# Patient Record
Sex: FEMALE | Race: WHITE | Employment: OTHER | URBAN - METROPOLITAN AREA
[De-identification: names, ages, dates, MRNs, and addresses within clinical notes are randomized per-mention and may not be internally consistent; named-entity substitution may affect disease eponyms.]

---

## 2017-01-10 ENCOUNTER — TELEPHONE (OUTPATIENT)
Dept: CARDIOLOGY CLINIC | Age: 72
End: 2017-01-10
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
## 2017-02-12 PROBLEM — R07.9 CHEST PAIN: Status: ACTIVE | Noted: 2017-02-12

## 2017-02-12 PROBLEM — N39.0 UTI (URINARY TRACT INFECTION): Status: ACTIVE | Noted: 2017-02-12

## 2017-02-14 ENCOUNTER — PROCEDURE VISIT (OUTPATIENT)
Dept: CARDIOLOGY CLINIC | Age: 72
End: 2017-02-14

## 2017-02-14 DIAGNOSIS — I44.2 COMPLETE HEART BLOCK (HCC): ICD-10-CM

## 2017-02-14 DIAGNOSIS — Z95.0 CARDIAC PACEMAKER IN SITU: Primary | ICD-10-CM

## 2017-02-14 DIAGNOSIS — I50.22 CHRONIC SYSTOLIC HEART FAILURE (HCC): ICD-10-CM

## 2017-02-14 PROCEDURE — 93290 INTERROG DEV EVAL ICPMS IP: CPT | Performed by: INTERNAL MEDICINE

## 2017-02-14 PROCEDURE — 93280 PM DEVICE PROGR EVAL DUAL: CPT | Performed by: INTERNAL MEDICINE

## 2017-03-01 DIAGNOSIS — I48.0 PAROXYSMAL ATRIAL FIBRILLATION (HCC): Primary | ICD-10-CM

## 2017-03-27 ENCOUNTER — PROCEDURE VISIT (OUTPATIENT)
Dept: CARDIOLOGY CLINIC | Age: 72
End: 2017-03-27

## 2017-03-27 ENCOUNTER — OFFICE VISIT (OUTPATIENT)
Dept: CARDIOLOGY CLINIC | Age: 72
End: 2017-03-27

## 2017-03-27 VITALS
HEIGHT: 67 IN | SYSTOLIC BLOOD PRESSURE: 122 MMHG | HEART RATE: 60 BPM | BODY MASS INDEX: 26.37 KG/M2 | OXYGEN SATURATION: 95 % | WEIGHT: 168 LBS | DIASTOLIC BLOOD PRESSURE: 62 MMHG

## 2017-03-27 DIAGNOSIS — I50.22 CHRONIC SYSTOLIC HEART FAILURE (HCC): ICD-10-CM

## 2017-03-27 DIAGNOSIS — I44.2 COMPLETE HEART BLOCK (HCC): ICD-10-CM

## 2017-03-27 DIAGNOSIS — I48.0 PAROXYSMAL ATRIAL FIBRILLATION (HCC): ICD-10-CM

## 2017-03-27 DIAGNOSIS — I10 ESSENTIAL HYPERTENSION, BENIGN: ICD-10-CM

## 2017-03-27 DIAGNOSIS — Z95.0 CARDIAC PACEMAKER IN SITU: Primary | ICD-10-CM

## 2017-03-27 DIAGNOSIS — I50.32 DIASTOLIC CHF, CHRONIC (HCC): Primary | ICD-10-CM

## 2017-03-27 PROCEDURE — 4040F PNEUMOC VAC/ADMIN/RCVD: CPT | Performed by: NURSE PRACTITIONER

## 2017-03-27 PROCEDURE — G8484 FLU IMMUNIZE NO ADMIN: HCPCS | Performed by: NURSE PRACTITIONER

## 2017-03-27 PROCEDURE — 93280 PM DEVICE PROGR EVAL DUAL: CPT | Performed by: INTERNAL MEDICINE

## 2017-03-27 PROCEDURE — G8427 DOCREV CUR MEDS BY ELIG CLIN: HCPCS | Performed by: NURSE PRACTITIONER

## 2017-03-27 PROCEDURE — 1036F TOBACCO NON-USER: CPT | Performed by: NURSE PRACTITIONER

## 2017-03-27 PROCEDURE — 93290 INTERROG DEV EVAL ICPMS IP: CPT | Performed by: INTERNAL MEDICINE

## 2017-03-27 PROCEDURE — G8400 PT W/DXA NO RESULTS DOC: HCPCS | Performed by: NURSE PRACTITIONER

## 2017-03-27 PROCEDURE — 3014F SCREEN MAMMO DOC REV: CPT | Performed by: NURSE PRACTITIONER

## 2017-03-27 PROCEDURE — 1123F ACP DISCUSS/DSCN MKR DOCD: CPT | Performed by: NURSE PRACTITIONER

## 2017-03-27 PROCEDURE — G8420 CALC BMI NORM PARAMETERS: HCPCS | Performed by: NURSE PRACTITIONER

## 2017-03-27 PROCEDURE — 1090F PRES/ABSN URINE INCON ASSESS: CPT | Performed by: NURSE PRACTITIONER

## 2017-03-27 PROCEDURE — 3017F COLORECTAL CA SCREEN DOC REV: CPT | Performed by: NURSE PRACTITIONER

## 2017-03-27 PROCEDURE — 99214 OFFICE O/P EST MOD 30 MIN: CPT | Performed by: NURSE PRACTITIONER

## 2017-03-27 RX ORDER — DILTIAZEM HYDROCHLORIDE 120 MG/1
120 TABLET, FILM COATED ORAL DAILY
COMMUNITY
End: 2017-03-30 | Stop reason: SDUPTHER

## 2017-03-27 RX ORDER — OMEPRAZOLE 20 MG/1
20 CAPSULE, DELAYED RELEASE ORAL 2 TIMES DAILY
COMMUNITY
End: 2017-03-30 | Stop reason: ALTCHOICE

## 2017-03-28 ENCOUNTER — TELEPHONE (OUTPATIENT)
Dept: CARDIOLOGY CLINIC | Age: 72
End: 2017-03-28

## 2017-03-30 RX ORDER — ONDANSETRON 4 MG/1
4 TABLET, ORALLY DISINTEGRATING ORAL EVERY 8 HOURS PRN
COMMUNITY
End: 2017-07-24 | Stop reason: ALTCHOICE

## 2017-03-30 RX ORDER — ESOMEPRAZOLE MAGNESIUM 40 MG/1
CAPSULE, DELAYED RELEASE ORAL DAILY
COMMUNITY
End: 2018-10-15 | Stop reason: ALTCHOICE

## 2017-03-30 RX ORDER — HYDROCHLOROTHIAZIDE 25 MG/1
25 TABLET ORAL DAILY
COMMUNITY
End: 2017-04-24 | Stop reason: SDUPTHER

## 2017-04-24 ENCOUNTER — OFFICE VISIT (OUTPATIENT)
Dept: CARDIOLOGY CLINIC | Age: 72
End: 2017-04-24

## 2017-04-24 VITALS
DIASTOLIC BLOOD PRESSURE: 62 MMHG | SYSTOLIC BLOOD PRESSURE: 138 MMHG | WEIGHT: 172 LBS | OXYGEN SATURATION: 96 % | HEIGHT: 67 IN | BODY MASS INDEX: 27 KG/M2 | HEART RATE: 80 BPM

## 2017-04-24 DIAGNOSIS — I50.32 DIASTOLIC CHF, CHRONIC (HCC): ICD-10-CM

## 2017-04-24 DIAGNOSIS — I10 ESSENTIAL HYPERTENSION, BENIGN: ICD-10-CM

## 2017-04-24 DIAGNOSIS — I48.0 PAROXYSMAL ATRIAL FIBRILLATION (HCC): ICD-10-CM

## 2017-04-24 DIAGNOSIS — I50.32 DIASTOLIC CHF, CHRONIC (HCC): Primary | ICD-10-CM

## 2017-04-24 DIAGNOSIS — Z79.899 LONG-TERM USE OF HIGH-RISK MEDICATION: ICD-10-CM

## 2017-04-24 PROCEDURE — 99214 OFFICE O/P EST MOD 30 MIN: CPT | Performed by: NURSE PRACTITIONER

## 2017-04-25 ENCOUNTER — HOSPITAL ENCOUNTER (OUTPATIENT)
Dept: OTHER | Age: 72
Discharge: HOME | End: 2017-04-30
Attending: INTERNAL MEDICINE | Admitting: INTERNAL MEDICINE

## 2017-04-25 LAB
ANION GAP SERPL CALCULATED.3IONS-SCNC: 15 MMOL/L (ref 3–16)
BUN BLDV-MCNC: 17 MG/DL (ref 7–20)
CALCIUM SERPL-MCNC: 9.8 MG/DL (ref 8.3–10.6)
CHLORIDE BLD-SCNC: 97 MMOL/L (ref 99–110)
CO2: 25 MMOL/L (ref 21–32)
CREAT SERPL-MCNC: 0.8 MG/DL (ref 0.6–1.2)
GFR AFRICAN AMERICAN: >60
GFR NON-AFRICAN AMERICAN: >60
GLUCOSE BLD-MCNC: 93 MG/DL (ref 70–99)
POTASSIUM SERPL-SCNC: 3.4 MMOL/L (ref 3.5–5.1)
SODIUM BLD-SCNC: 137 MMOL/L (ref 136–145)

## 2017-04-25 RX ORDER — POTASSIUM CHLORIDE 20 MEQ/1
TABLET, EXTENDED RELEASE ORAL
Qty: 30 TABLET | Refills: 3 | OUTPATIENT
Start: 2017-04-25 | End: 2017-04-25

## 2017-04-25 RX ORDER — DILTIAZEM HYDROCHLORIDE 120 MG/1
CAPSULE, COATED, EXTENDED RELEASE ORAL
Qty: 30 CAPSULE | Refills: 3 | OUTPATIENT
Start: 2017-04-25 | End: 2017-06-22 | Stop reason: SDUPTHER

## 2017-04-25 RX ORDER — HYDROCHLOROTHIAZIDE 25 MG/1
TABLET ORAL
Qty: 90 TABLET | Refills: 3 | Status: SHIPPED | OUTPATIENT
Start: 2017-04-25 | End: 2017-06-02

## 2017-04-25 RX ORDER — POTASSIUM CHLORIDE 20 MEQ/1
TABLET, EXTENDED RELEASE ORAL
Qty: 90 TABLET | Refills: 3 | Status: SHIPPED | OUTPATIENT
Start: 2017-04-25 | End: 2017-08-24 | Stop reason: SDUPTHER

## 2017-04-25 RX ORDER — HYDROCHLOROTHIAZIDE 25 MG/1
25 TABLET ORAL DAILY
Qty: 30 TABLET | Refills: 3 | OUTPATIENT
Start: 2017-04-25 | End: 2017-04-25

## 2017-05-02 ENCOUNTER — TELEPHONE (OUTPATIENT)
Dept: CARDIOLOGY CLINIC | Age: 72
End: 2017-05-02

## 2017-05-05 ENCOUNTER — TELEPHONE (OUTPATIENT)
Dept: CARDIOLOGY CLINIC | Age: 72
End: 2017-05-05

## 2017-05-12 ENCOUNTER — TELEPHONE (OUTPATIENT)
Dept: CARDIOLOGY | Age: 72
End: 2017-05-12

## 2017-05-12 DIAGNOSIS — E87.6 HYPOKALEMIA: Primary | ICD-10-CM

## 2017-05-30 ENCOUNTER — HOSPITAL ENCOUNTER (OUTPATIENT)
Dept: OTHER | Age: 72
Discharge: HOME | End: 2017-05-30
Attending: NURSE PRACTITIONER | Admitting: NURSE PRACTITIONER

## 2017-05-30 DIAGNOSIS — M25.431 SWELLING OF JOINT OF RIGHT WRIST: ICD-10-CM

## 2017-05-30 DIAGNOSIS — M79.89 SWELLING OF RIGHT HAND: ICD-10-CM

## 2017-06-02 ENCOUNTER — TELEPHONE (OUTPATIENT)
Dept: CARDIOLOGY CLINIC | Age: 72
End: 2017-06-02

## 2017-06-02 RX ORDER — TORSEMIDE 20 MG/1
20 TABLET ORAL DAILY
Qty: 30 TABLET | Refills: 5 | Status: SHIPPED | OUTPATIENT
Start: 2017-06-02 | End: 2017-06-02 | Stop reason: SDUPTHER

## 2017-06-04 RX ORDER — TORSEMIDE 20 MG/1
TABLET ORAL
Qty: 90 TABLET | Refills: 0 | Status: SHIPPED | OUTPATIENT
Start: 2017-06-04 | End: 2017-09-05

## 2017-06-05 ENCOUNTER — TELEPHONE (OUTPATIENT)
Dept: CARDIOLOGY CLINIC | Age: 72
End: 2017-06-05

## 2017-06-26 RX ORDER — DILTIAZEM HYDROCHLORIDE 120 MG/1
CAPSULE, EXTENDED RELEASE ORAL
Qty: 30 CAPSULE | Refills: 5 | Status: SHIPPED | OUTPATIENT
Start: 2017-06-26 | End: 2017-11-02 | Stop reason: SDUPTHER

## 2017-07-24 ENCOUNTER — OFFICE VISIT (OUTPATIENT)
Dept: CARDIOLOGY CLINIC | Age: 72
End: 2017-07-24

## 2017-07-24 VITALS
BODY MASS INDEX: 29.53 KG/M2 | HEIGHT: 64 IN | HEART RATE: 61 BPM | SYSTOLIC BLOOD PRESSURE: 112 MMHG | OXYGEN SATURATION: 97 % | WEIGHT: 173 LBS | DIASTOLIC BLOOD PRESSURE: 50 MMHG

## 2017-07-24 DIAGNOSIS — I10 ESSENTIAL HYPERTENSION, BENIGN: ICD-10-CM

## 2017-07-24 DIAGNOSIS — I48.0 PAROXYSMAL ATRIAL FIBRILLATION (HCC): ICD-10-CM

## 2017-07-24 DIAGNOSIS — I50.32 DIASTOLIC CHF, CHRONIC (HCC): Primary | ICD-10-CM

## 2017-07-24 PROBLEM — N39.0 UTI (URINARY TRACT INFECTION): Status: RESOLVED | Noted: 2017-02-12 | Resolved: 2017-07-24

## 2017-07-24 PROCEDURE — 99214 OFFICE O/P EST MOD 30 MIN: CPT | Performed by: NURSE PRACTITIONER

## 2017-07-24 RX ORDER — LISINOPRIL 10 MG/1
10 TABLET ORAL DAILY
COMMUNITY
End: 2018-10-15 | Stop reason: SDUPTHER

## 2017-08-25 RX ORDER — POTASSIUM CHLORIDE 20 MEQ/1
TABLET, EXTENDED RELEASE ORAL
Qty: 30 TABLET | Refills: 5 | Status: SHIPPED | OUTPATIENT
Start: 2017-08-25 | End: 2018-02-06 | Stop reason: SDUPTHER

## 2017-09-05 RX ORDER — TORSEMIDE 20 MG/1
TABLET ORAL
Qty: 90 TABLET | Refills: 0 | Status: SHIPPED | OUTPATIENT
Start: 2017-09-05 | End: 2017-12-11

## 2017-11-02 RX ORDER — DILTIAZEM HYDROCHLORIDE 120 MG/1
CAPSULE, EXTENDED RELEASE ORAL
Qty: 30 CAPSULE | Refills: 1 | Status: SHIPPED | OUTPATIENT
Start: 2017-11-02 | End: 2018-05-01

## 2017-12-11 RX ORDER — TORSEMIDE 20 MG/1
TABLET ORAL
Qty: 90 TABLET | Refills: 0 | Status: SHIPPED | OUTPATIENT
Start: 2017-12-11 | End: 2018-03-15 | Stop reason: SDUPTHER

## 2018-03-02 ENCOUNTER — OFFICE VISIT (OUTPATIENT)
Dept: CARDIOLOGY CLINIC | Age: 73
End: 2018-03-02

## 2018-03-02 ENCOUNTER — PROCEDURE VISIT (OUTPATIENT)
Dept: CARDIOLOGY CLINIC | Age: 73
End: 2018-03-02

## 2018-03-02 ENCOUNTER — HOSPITAL ENCOUNTER (OUTPATIENT)
Dept: GENERAL RADIOLOGY | Age: 73
Discharge: HOME | End: 2018-03-02
Attending: NURSE PRACTITIONER | Admitting: NURSE PRACTITIONER

## 2018-03-02 ENCOUNTER — TELEPHONE (OUTPATIENT)
Dept: CARDIOLOGY CLINIC | Age: 73
End: 2018-03-02

## 2018-03-02 VITALS
HEIGHT: 64 IN | WEIGHT: 187 LBS | BODY MASS INDEX: 31.92 KG/M2 | DIASTOLIC BLOOD PRESSURE: 60 MMHG | OXYGEN SATURATION: 97 % | HEART RATE: 61 BPM | SYSTOLIC BLOOD PRESSURE: 142 MMHG

## 2018-03-02 DIAGNOSIS — R60.0 LOCALIZED EDEMA: ICD-10-CM

## 2018-03-02 DIAGNOSIS — I44.2 COMPLETE HEART BLOCK (HCC): ICD-10-CM

## 2018-03-02 DIAGNOSIS — I10 ESSENTIAL HYPERTENSION: ICD-10-CM

## 2018-03-02 DIAGNOSIS — I50.33 ACUTE ON CHRONIC DIASTOLIC CONGESTIVE HEART FAILURE (HCC): Primary | ICD-10-CM

## 2018-03-02 DIAGNOSIS — Z95.0 PACEMAKER: Primary | ICD-10-CM

## 2018-03-02 DIAGNOSIS — I48.20 CHRONIC ATRIAL FIBRILLATION (HCC): ICD-10-CM

## 2018-03-02 LAB
A/G RATIO: 1.3 (ref 1.1–2.2)
ALBUMIN SERPL-MCNC: 4.3 G/DL (ref 3.4–5)
ALP BLD-CCNC: 94 U/L (ref 40–129)
ALT SERPL-CCNC: 12 U/L (ref 10–40)
ANION GAP SERPL CALCULATED.3IONS-SCNC: 12 MMOL/L (ref 3–16)
AST SERPL-CCNC: 26 U/L (ref 15–37)
BILIRUB SERPL-MCNC: 0.7 MG/DL (ref 0–1)
BUN BLDV-MCNC: 16 MG/DL (ref 7–20)
CALCIUM SERPL-MCNC: 9.6 MG/DL (ref 8.3–10.6)
CHLORIDE BLD-SCNC: 102 MMOL/L (ref 99–110)
CO2: 28 MMOL/L (ref 21–32)
CREAT SERPL-MCNC: 0.8 MG/DL (ref 0.6–1.2)
GFR AFRICAN AMERICAN: >60
GFR NON-AFRICAN AMERICAN: >60
GLOBULIN: 3.4 G/DL
GLUCOSE BLD-MCNC: 106 MG/DL (ref 70–99)
POTASSIUM SERPL-SCNC: 3.8 MMOL/L (ref 3.5–5.1)
PRO-BNP: 1134 PG/ML (ref 0–449)
SODIUM BLD-SCNC: 142 MMOL/L (ref 136–145)
TOTAL PROTEIN: 7.7 G/DL (ref 6.4–8.2)

## 2018-03-02 PROCEDURE — 99214 OFFICE O/P EST MOD 30 MIN: CPT | Performed by: NURSE PRACTITIONER

## 2018-03-02 PROCEDURE — 93280 PM DEVICE PROGR EVAL DUAL: CPT | Performed by: INTERNAL MEDICINE

## 2018-03-02 RX ORDER — ONDANSETRON 4 MG/1
4 TABLET, FILM COATED ORAL EVERY 6 HOURS PRN
COMMUNITY

## 2018-03-02 NOTE — LETTER
nourished / developed  NEUROLOGIC:  Awake and orientated to person, place and time. PSYCH: Calm affect; very pleasant lady. SKIN: Warm and dry. HEENT: Sclera non-icteric, normocephalic, neck supple, no elevation of JVP, normal carotid pulses with no bruits and thyroid normal size. LUNGS:  No increased work of breathing and clear to auscultation, no crackles or wheezing  CARDIOVASCULAR:  Regular rate 64 and rhythm with no murmurs, gallops, rubs, or abnormal heart sounds, normal PMI. The apical impulses not displaced  JVP less than 8 cm H2O  Heart tones are crisp and normal  Cervical veins are not engorged  The carotid upstroke is normal in amplitude and contour without delay or bruit  JVP is not elevated  ABDOMEN:  Normal bowel sounds, non-distended and non-tender to palpation  EXT: distal RLE edema, no calf tenderness. Pulses are present bilaterally. DATA:      Radiology Review:  Pertinent images / reports were reviewed as a part of this visit and reveals the following:    [id number] Score for Atrial Fibrillation Stroke Risk   Risk   Factors  Component Value   C CHF Yes 1   H HTN Yes 1   A2 Age >= 76 Yes,  (67 y.o.) 2   D DM No 0   S2 Prior Stroke/TIA No 0   V Vascular Disease No 0   A Age 74-69 No,  (67 y.o.) 0   Sc Sex female 1    [id number]  Score  5   Score last updated 3/2/18 4:02 PM    Stress Test: Feb '17:  Summary    There is normal isotope uptake at stress and rest. There is no evidence of    myocardial ischemia or scar. Normal LV function with ejection fraction of    67%. There are no regional wall motion abnormalities. Low risk study.         Device interrogation: 3/26/17:   Patient comes in for programming evaluation for her Medtronic Bi-Ventricular Pacemaker. All sensing and pacing parameters are within normal range. No changes need to be made at this time. Please see interrogation for more detail. OPTIVOL ELEVATED AND ONGOING SINCE 3/19/17.

## 2018-03-02 NOTE — PROGRESS NOTES
[address]     Outpatient Follow Up Note    Yael Roberts is 74 y.o. female who presents today with a history of AF with AV meek ablation with placement of a Bi-V PPM, HTN and d-HF;   orthostatic hypotension with near syncope-attributed to Norvasc and BB therapy    CHIEF COMPLAINT / HPI:  Follow Up secondary to d-CHF watching her fluid intake. She's doing pretty good just forgetful. Subjective:   She has chest discomfort that goes / goes. It comes on if she stands too long or does too much. She has no associated symptoms and goes away with rest. Her discomfort is not new and has problems with arthritis. She takes tylenol when she can afford it. Every once in awhile, she'll have problems breathing. She denies orthopnea/PND. She toss/turns a lot. She has RLS and has had trouble getting her requip. She has swelling in her legs some days and not others. She tries keeping them propped up. She tolerates climbing the 4 steps at her house. Her weight is up ~ 14# by office scales over the past 7 months. She has a little dizziness when getting up out of a chair; she does ankle pumps and drinking warm water which seem to help. Every once in awhile her heart flutters. It doesn't last long. These symptoms are stable since the last OV. With regard to medication therapy the patient has been compliant with prescribed regimen. They have tolerated therapy to date. Past Medical History:   Diagnosis Date    Allergic     Anemia     Atrial fibrillation (HCC)     Breast cancer ([facility] [address]     s/p left mastectomy 2001    Cardiac pacemaker in situ 10/1/2015 left    AV node ablation and Bi-V PPM (Medtronic)    Cellulitis     right leg.     Hypertension     Murmur     Osteoarthritis     Sinus drainage      Social History:    History   Smoking Status    Former Smoker    Types: Cigarettes   Smokeless Tobacco    Never Used     Comment: 5 cigarettes per day for 3-4 years     Current Medications:  Current CARDIOVASCULAR: See HPI  GI: No nausea, vomiting, diarrhea, constipation, abdominal pain or changes in bowel habits. : No urinary frequency, urgency, incontinence hematuria or dysuria. SKIN: No cyanosis or skin lesions. MUSCULOSKELETAL: restless legs without Requip   NEUROLOGICAL: No syncope or TIA-like symptoms. PSYCHIATRIC: No anxiety, pain, insomnia or depression    Objective:   PHYSICAL EXAM:       Vitals:    03/02/18 1057 03/02/18 1114   BP: 130/70 (!) 142/60   Pulse: 61    SpO2: 97%    Weight: 187 lb (84.8 kg)    Height: 5' 4\" (1.626 m)       VITALS:  /70   Pulse 61   Ht 5' 4\" (1.626 m)   Wt 187 lb (84.8 kg)   SpO2 97%   BMI 32.10 kg/m²   CONSTITUTIONAL: Cooperative, no apparent distress, and appears well nourished / developed  NEUROLOGIC:  Awake and orientated to person, place and time. PSYCH: Calm affect; very pleasant lady. SKIN: Warm and dry. HEENT: Sclera non-icteric, normocephalic, neck supple, no elevation of JVP, normal carotid pulses with no bruits and thyroid normal size. LUNGS:  No increased work of breathing and clear to auscultation, no crackles or wheezing  CARDIOVASCULAR:  Regular rate 64 and rhythm with no murmurs, gallops, rubs, or abnormal heart sounds, normal PMI. The apical impulses not displaced  JVP less than 8 cm H2O  Heart tones are crisp and normal  Cervical veins are not engorged  The carotid upstroke is normal in amplitude and contour without delay or bruit  JVP is not elevated  ABDOMEN:  Normal bowel sounds, non-distended and non-tender to palpation  EXT: distal RLE edema, no calf tenderness. Pulses are present bilaterally.     DATA:    Lab Results   Component Value Date    ALT 11 06/03/2017    AST 21 06/03/2017    ALKPHOS 94 06/03/2017    BILITOT 0.5 06/03/2017     Lab Results   Component Value Date    CREATININE 0.6 06/03/2017    BUN 13 06/03/2017     (H) 06/03/2017    K 4.3 06/03/2017     06/03/2017    CO2 25 06/03/2017     Lab Results   Component

## 2018-03-02 NOTE — PATIENT INSTRUCTIONS
Labs today    Ultrasound of your legs since you've had swelling in one leg and not the other  \  appt in 4 months

## 2018-03-05 ENCOUNTER — TELEPHONE (OUTPATIENT)
Dept: CARDIOLOGY CLINIC | Age: 73
End: 2018-03-05

## 2018-03-05 DIAGNOSIS — Z79.899 MEDICATION MANAGEMENT: Primary | ICD-10-CM

## 2018-03-05 NOTE — TELEPHONE ENCOUNTER
Spoke with Demario Yadav, relayed message per NPTS regarding results of labs and medication change. Pt wrote down instructions. Placed lab orders in epic.

## 2018-03-08 RX ORDER — POTASSIUM CHLORIDE 20 MEQ/1
TABLET, EXTENDED RELEASE ORAL
Qty: 90 TABLET | Refills: 3 | Status: SHIPPED | OUTPATIENT
Start: 2018-03-08 | End: 2019-02-25 | Stop reason: SDUPTHER

## 2018-03-09 NOTE — PROGRESS NOTES
Device interrogation by company representative. See interrogation for further details. Patient to see NP-Narda today.

## 2018-03-15 LAB
ALBUMIN SERPL-MCNC: 4.1 G/DL
ALP BLD-CCNC: 98 U/L
ALT SERPL-CCNC: 13 U/L
ANION GAP SERPL CALCULATED.3IONS-SCNC: NORMAL MMOL/L
AST SERPL-CCNC: 25 U/L
BASOPHILS ABSOLUTE: NORMAL /ΜL
BASOPHILS RELATIVE PERCENT: NORMAL %
BILIRUB SERPL-MCNC: 0.5 MG/DL (ref 0.1–1.4)
BUN BLDV-MCNC: 25 MG/DL
CALCIUM SERPL-MCNC: 10 MG/DL
CHLORIDE BLD-SCNC: 102 MMOL/L
CHOLESTEROL, TOTAL: 196 MG/DL
CHOLESTEROL/HDL RATIO: ABNORMAL
CO2: 27 MMOL/L
CREAT SERPL-MCNC: 1.01 MG/DL
EOSINOPHILS ABSOLUTE: NORMAL /ΜL
EOSINOPHILS RELATIVE PERCENT: NORMAL %
GFR CALCULATED: NORMAL
GLUCOSE BLD-MCNC: 95 MG/DL
HCT VFR BLD CALC: 38.9 % (ref 36–46)
HDLC SERPL-MCNC: 72 MG/DL (ref 35–70)
HEMOGLOBIN: 12.7 G/DL (ref 12–16)
LDL CHOLESTEROL CALCULATED: 111 MG/DL (ref 0–160)
LYMPHOCYTES ABSOLUTE: NORMAL /ΜL
LYMPHOCYTES RELATIVE PERCENT: NORMAL %
MCH RBC QN AUTO: 26.1 PG
MCHC RBC AUTO-ENTMCNC: 32.6 G/DL
MCV RBC AUTO: 80 FL
MONOCYTES ABSOLUTE: NORMAL /ΜL
MONOCYTES RELATIVE PERCENT: NORMAL %
NEUTROPHILS ABSOLUTE: NORMAL /ΜL
NEUTROPHILS RELATIVE PERCENT: NORMAL %
PLATELET # BLD: 304 K/ΜL
PMV BLD AUTO: NORMAL FL
POTASSIUM SERPL-SCNC: 3.7 MMOL/L
RBC # BLD: 4.87 10^6/ΜL
SODIUM BLD-SCNC: 146 MMOL/L
TOTAL PROTEIN: 7.4
TRIGL SERPL-MCNC: 63 MG/DL
VLDLC SERPL CALC-MCNC: 13 MG/DL
WBC # BLD: 7.6 10^3/ML

## 2018-03-15 RX ORDER — TORSEMIDE 20 MG/1
TABLET ORAL
Qty: 90 TABLET | Refills: 3 | Status: SHIPPED | OUTPATIENT
Start: 2018-03-15 | End: 2019-03-11 | Stop reason: SDUPTHER

## 2018-03-29 ENCOUNTER — TELEPHONE (OUTPATIENT)
Dept: CARDIOLOGY CLINIC | Age: 73
End: 2018-03-29

## 2018-03-29 ENCOUNTER — HOSPITAL ENCOUNTER (OUTPATIENT)
Dept: VASCULAR LAB | Age: 73
Discharge: HOME | End: 2018-03-29
Attending: NURSE PRACTITIONER | Admitting: NURSE PRACTITIONER

## 2018-03-29 DIAGNOSIS — R60.0 LOCALIZED EDEMA: ICD-10-CM

## 2018-03-29 NOTE — TELEPHONE ENCOUNTER
Mary Eubanks NP  P [facility] Staff             Labs look ok      Spoke with patients daughter, per HIPPA relayed message and stated understanding

## 2018-03-30 ENCOUNTER — TELEPHONE (OUTPATIENT)
Dept: CARDIOLOGY CLINIC | Age: 73
End: 2018-03-30

## 2018-03-30 NOTE — TELEPHONE ENCOUNTER
VL Extremity Venous Bilateral   Order: [id number]   Status:  Final result   Visible to patient:  No (Not Released)   Notes Recorded by Hudson Cheadle, MA on 3/30/2018 at 8:26 AM EDT  Faxed to PCP.  ------    Notes Recorded by Daljit Stock NP on 3/30/2018 at 8:13 AM EDT  I called her PCP with her US results (please fax them to [phone])  I also left a message for Ms.  Hubert to call back : no clots but multiple enlarged lymph nodes zahida groin

## 2018-05-01 DIAGNOSIS — I48.20 CHRONIC ATRIAL FIBRILLATION (HCC): Primary | ICD-10-CM

## 2018-05-01 RX ORDER — DILTIAZEM HYDROCHLORIDE 120 MG/1
CAPSULE, EXTENDED RELEASE ORAL
Qty: 30 CAPSULE | Refills: 10 | Status: SHIPPED | OUTPATIENT
Start: 2018-05-01 | End: 2019-03-28 | Stop reason: SDUPTHER

## 2018-07-09 ENCOUNTER — PROCEDURE VISIT (OUTPATIENT)
Dept: CARDIOLOGY CLINIC | Age: 73
End: 2018-07-09

## 2018-07-09 ENCOUNTER — OFFICE VISIT (OUTPATIENT)
Dept: CARDIOLOGY CLINIC | Age: 73
End: 2018-07-09

## 2018-07-09 VITALS
HEIGHT: 64 IN | HEART RATE: 65 BPM | SYSTOLIC BLOOD PRESSURE: 120 MMHG | DIASTOLIC BLOOD PRESSURE: 60 MMHG | OXYGEN SATURATION: 97 % | BODY MASS INDEX: 29.71 KG/M2 | WEIGHT: 174 LBS

## 2018-07-09 DIAGNOSIS — I50.32 DIASTOLIC CHF, CHRONIC (HCC): ICD-10-CM

## 2018-07-09 DIAGNOSIS — I50.32 CHRONIC DIASTOLIC CONGESTIVE HEART FAILURE (HCC): Primary | ICD-10-CM

## 2018-07-09 DIAGNOSIS — I44.2 COMPLETE HEART BLOCK (HCC): ICD-10-CM

## 2018-07-09 DIAGNOSIS — I48.0 PAROXYSMAL ATRIAL FIBRILLATION (HCC): ICD-10-CM

## 2018-07-09 DIAGNOSIS — I10 ESSENTIAL HYPERTENSION: ICD-10-CM

## 2018-07-09 DIAGNOSIS — I50.33 ACUTE ON CHRONIC DIASTOLIC CONGESTIVE HEART FAILURE (HCC): ICD-10-CM

## 2018-07-09 PROCEDURE — 99214 OFFICE O/P EST MOD 30 MIN: CPT | Performed by: NURSE PRACTITIONER

## 2018-07-09 PROCEDURE — 93280 PM DEVICE PROGR EVAL DUAL: CPT | Performed by: INTERNAL MEDICINE

## 2018-07-09 PROCEDURE — 93290 INTERROG DEV EVAL ICPMS IP: CPT | Performed by: NURSE PRACTITIONER

## 2018-07-09 NOTE — PROGRESS NOTES
TAKE 1 TABLET BY MOUTH EVERY DAY 90 tablet 3    potassium chloride (KLOR-CON M) 20 MEQ extended release tablet TAKE 1 TABLET BY MOUTH EVERY DAY 90 tablet 3    XARELTO 20 MG TABS tablet TAKE 1 TABLET BY MOUTH DAILY WITH BREAKFAST 30 tablet 5    ondansetron (ZOFRAN) 4 MG tablet Take 4 mg by mouth every 6 hours as needed for Nausea or Vomiting      lisinopril (PRINIVIL;ZESTRIL) 10 MG tablet Take 10 mg by mouth daily      aspirin 81 MG tablet Take 81 mg by mouth daily      esomeprazole (NEXIUM) 40 MG delayed release capsule daily       DOCUSATE SODIUM PO Take 8.6-50 mg by mouth Takes 2 tablets at bedtime      albuterol sulfate  (90 BASE) MCG/ACT inhaler Inhale 2 puffs into the lungs every 6 hours as needed for Wheezing 1 Inhaler 0    acetaminophen (TYLENOL) 325 MG tablet Take 650 mg by mouth every 4 hours as needed for Pain      sucralfate (CARAFATE) 1 GM tablet Take 1 g by mouth 3 times daily      CALCIUM CARBONATE-VITAMIN D PO Take 500-600 mg by mouth 2 times daily      meclizine (ANTIVERT) 25 MG tablet TAKE 1 TABLET BY MOUTH EVERY DAY (Patient taking differently: prn) 30 tablet 0    fluticasone (FLONASE) 50 MCG/ACT nasal spray 1 spray by Nasal route daily.  calcium carbonate (TUMS) 500 MG chewable tablet Take 1 tablet by mouth 2 times daily.  fluticasone propionate 250 MCG/BLIST AEPB Inhale into the lungs 2 times daily      rOPINIRole (REQUIP) 0.5 MG tablet Take 0.5 mg by mouth every evening       No current facility-administered medications for this visit. REVIEW OF SYSTEMS:    CONSTITUTIONAL: + weight gain ; - fatigue, weakness, night sweats or fever. HEENT: No new vision difficulties or ringing in the ears. RESPIRATORY: No new SOB, PND, orthopnea or cough. CARDIOVASCULAR: See HPI  GI: No nausea, vomiting, diarrhea, constipation, abdominal pain or changes in bowel habits. : No urinary frequency, urgency, incontinence hematuria or dysuria.   SKIN: No cyanosis or skin lesions. MUSCULOSKELETAL: restless legs without Requip   NEUROLOGICAL: No syncope or TIA-like symptoms. PSYCHIATRIC: No anxiety, pain, insomnia or depression    Objective:   PHYSICAL EXAM:       Vitals:    07/09/18 1120 07/09/18 1144   BP: 110/60 120/60   Site:  Right Arm   Cuff Size:  Medium Adult   Pulse: 65    SpO2: 97%    Weight: 174 lb (78.9 kg)    Height: 5' 4\" (1.626 m)       VITALS:  /60   Pulse 65   Ht 5' 4\" (1.626 m)   Wt 174 lb (78.9 kg)   SpO2 97%   BMI 29.87 kg/m²   CONSTITUTIONAL: Cooperative, no apparent distress, and appears well nourished / developed  NEUROLOGIC:  Awake and orientated to person, place and time. PSYCH: Calm affect; very pleasant lady. SKIN: Warm and dry. HEENT: Sclera non-icteric, normocephalic, neck supple, no elevation of JVP, normal carotid pulses with no bruits and thyroid normal size. LUNGS:  No increased work of breathing and clear to auscultation, no crackles or wheezing  CARDIOVASCULAR:  Regular rate 68 and rhythm with no murmurs, gallops, rubs, or abnormal heart sounds, normal PMI. The apical impulses not displaced  JVP less than 8 cm H2O  Heart tones are crisp and normal  Cervical veins are not engorged  The carotid upstroke is normal in amplitude and contour without delay or bruit  JVP is not elevated  ABDOMEN:  Normal bowel sounds, non-distended and non-tender to palpation  EXT: no edema, no calf tenderness. Pulses are present bilaterally.     DATA:    Lab Results   Component Value Date    ALT 13 03/15/2018    AST 25 03/15/2018    ALKPHOS 98 03/15/2018    BILITOT 0.5 03/15/2018     Lab Results   Component Value Date    CREATININE 1.01 03/15/2018    BUN 25 03/15/2018     03/15/2018    K 3.7 03/15/2018     03/15/2018    CO2 27 03/15/2018     Lab Results   Component Value Date    WBC 7.6 03/15/2018    HGB 12.7 03/15/2018    HCT 38.9 03/15/2018    MCV 80 03/15/2018     03/15/2018     No components found for: CHLPL  Lab Results   Component episode of NSVT noted on device interrogation: 6 & 10 bt run in April   ~no BB d/t orthostasis ; tolerating diltiazem   ~CHADs 5    3. Essential hypertension, benign  ~controlled        I had the opportunity to review the clinical symptoms and presentation of Phil Hillman. Plan:     1. Extra torsemide 20 mg prn for ankle swelling  2. BNP / BMP today :  NSVT & decompensation   3. F/U in 3 months / test dependent     Overall the patient is stable from CV standpoint    I have addresed the patient's cardiac risk factors and adjusted pharmacologic treatment as needed. In addition, I have reinforced the need for patient directed risk factor modification. Further evaluation will be based upon the patient's clinical course and testing results. All questions and concerns were addressed to the patient. Alternatives to my treatment were discussed. The patient is not currently smoking. The risks related to smoking were reviewed with the patient. Recommend maintaining a smoke-free lifestyle. Patient is not on a beta-blocker: hx orthostatic BP  Patient is on an ace-i  Patient is not on a statin :neg CAD / diag for hyperlipidemia    Antiplatelet therapy / anti-coagulation has been recommended / prescribed for this patient. Education conducted on adverse reactions including bleeding was discussed. Angiotension inhibitor/angiotension receptor blocker has been prescribed / recommended for congestive heart failure. Daily weight, low sodium diet were discussed. Patient instructed to call the office with a weight gain: > 3 # over night or 5# in one week; swelling, SOB/orthopnea/PND    The patient verbalizes understanding not to stop medications without discussing with us. Discussed exercise: 30-60 minutes 7 days/week  Discussed Low saturated fat/SAAD diet. Estimated fluid intake / day ~ 2400 ml    Thank you for allowing to us to participate in the care of Phil Hillman.     Enedina Nicholas,

## 2018-07-09 NOTE — PATIENT INSTRUCTIONS
Labs today    May take an extra torsemide dose (20 mg ) if your ankles and legs are swollen.  If you need to take it, then call me    appt in three months

## 2018-07-13 LAB
BUN BLDV-MCNC: 15 MG/DL
CALCIUM SERPL-MCNC: 9.5 MG/DL
CHLORIDE BLD-SCNC: 100 MMOL/L
CO2: 29 MMOL/L
CREAT SERPL-MCNC: 0.98 MG/DL
GFR CALCULATED: 57
GLUCOSE BLD-MCNC: 84 MG/DL
POTASSIUM SERPL-SCNC: 3.6 MMOL/L
SODIUM BLD-SCNC: 145 MMOL/L

## 2018-07-19 ENCOUNTER — TELEPHONE (OUTPATIENT)
Dept: CARDIOLOGY CLINIC | Age: 73
End: 2018-07-19

## 2018-07-19 NOTE — TELEPHONE ENCOUNTER
Created telephone encounter. Per Pt HIPAA from can leave results on machine. LMOM relaying message per NPTS regarding lab results. Pt to call the office with any concerns.
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
## 2018-10-11 DIAGNOSIS — I48.0 PAROXYSMAL ATRIAL FIBRILLATION (HCC): ICD-10-CM

## 2018-10-15 ENCOUNTER — HOSPITAL ENCOUNTER (OUTPATIENT)
Age: 73
Discharge: HOME | End: 2018-10-15
Payer: COMMERCIAL

## 2018-10-15 ENCOUNTER — PROCEDURE VISIT (OUTPATIENT)
Dept: CARDIOLOGY CLINIC | Age: 73
End: 2018-10-15
Payer: COMMERCIAL

## 2018-10-15 ENCOUNTER — OFFICE VISIT (OUTPATIENT)
Dept: CARDIOLOGY CLINIC | Age: 73
End: 2018-10-15
Payer: COMMERCIAL

## 2018-10-15 VITALS
BODY MASS INDEX: 31.86 KG/M2 | DIASTOLIC BLOOD PRESSURE: 68 MMHG | WEIGHT: 186.6 LBS | SYSTOLIC BLOOD PRESSURE: 122 MMHG | HEART RATE: 63 BPM | HEIGHT: 64 IN | OXYGEN SATURATION: 98 %

## 2018-10-15 DIAGNOSIS — I50.32 CHRONIC DIASTOLIC CONGESTIVE HEART FAILURE (HCC): Primary | ICD-10-CM

## 2018-10-15 DIAGNOSIS — I44.2 COMPLETE HEART BLOCK (HCC): ICD-10-CM

## 2018-10-15 DIAGNOSIS — I10 ESSENTIAL HYPERTENSION: ICD-10-CM

## 2018-10-15 DIAGNOSIS — I48.0 PAROXYSMAL ATRIAL FIBRILLATION (HCC): ICD-10-CM

## 2018-10-15 DIAGNOSIS — Z95.0 PACEMAKER: Primary | ICD-10-CM

## 2018-10-15 LAB
ANION GAP SERPL CALCULATED.3IONS-SCNC: 16 MMOL/L (ref 3–16)
BUN BLDV-MCNC: 16 MG/DL (ref 7–20)
CALCIUM SERPL-MCNC: 9.8 MG/DL (ref 8.3–10.6)
CHLORIDE BLD-SCNC: 103 MMOL/L (ref 99–110)
CO2: 24 MMOL/L (ref 21–32)
CREAT SERPL-MCNC: 0.8 MG/DL (ref 0.6–1.2)
GFR AFRICAN AMERICAN: >60
GFR NON-AFRICAN AMERICAN: >60
GLUCOSE BLD-MCNC: 94 MG/DL (ref 70–99)
POTASSIUM SERPL-SCNC: 4.2 MMOL/L (ref 3.5–5.1)
PRO-BNP: 850 PG/ML (ref 0–449)
SODIUM BLD-SCNC: 143 MMOL/L (ref 136–145)

## 2018-10-15 PROCEDURE — 36415 COLL VENOUS BLD VENIPUNCTURE: CPT

## 2018-10-15 PROCEDURE — 80048 BASIC METABOLIC PNL TOTAL CA: CPT

## 2018-10-15 PROCEDURE — 99214 OFFICE O/P EST MOD 30 MIN: CPT | Performed by: NURSE PRACTITIONER

## 2018-10-15 PROCEDURE — 93280 PM DEVICE PROGR EVAL DUAL: CPT | Performed by: INTERNAL MEDICINE

## 2018-10-15 PROCEDURE — 83880 ASSAY OF NATRIURETIC PEPTIDE: CPT

## 2018-10-15 RX ORDER — LISINOPRIL 10 MG/1
10 TABLET ORAL DAILY
Qty: 30 TABLET | Refills: 5 | Status: SHIPPED | OUTPATIENT
Start: 2018-10-15 | End: 2018-10-16 | Stop reason: SDUPTHER

## 2018-10-15 RX ORDER — ROPINIROLE 0.5 MG/1
0.5 TABLET, FILM COATED ORAL DAILY
COMMUNITY

## 2018-10-15 RX ORDER — ESOMEPRAZOLE MAGNESIUM 40 MG/1
40 CAPSULE, DELAYED RELEASE ORAL
COMMUNITY

## 2018-10-15 NOTE — COMMUNICATION BODY
time. Please see interrogation for more detail. BIV pace 99.9%  optivol with chronic elevation. 2 NSVT events 6-10 beats in 4/2018. Atrial lead off d/t chronic AF, mode VVIR per RPS. Patient will follow up in 3-6 months in office for a device check bc she is not on remote monitoring. Assessment:     1. Diastolic CHF, chronic (HCC)   ~symptoms of decompensation correlating to Optival findings  ~Optival: thoracic impedence : showing episodes of decompensation, looks like begins around the first(s) of the month  ~wt up another 12# / 3 months by office scales  ~no longer on lisinopril   ~Neg for crackles /  Edema / JVD  ~has not been taking demadex daily     2. Paroxysmal atrial fibrillation (HCC)   ~controlled AP / regular  ~s/p PPM  ~2 episode of NSVT noted in July  ~no BB d/t orthostasis ; tolerating diltiazem   ~CHADs 5    3. Essential hypertension, benign  ~controlled        I had the opportunity to review the clinical symptoms and presentation of Raad Donald. Plan:     1. Torsemide 20 mg daily    Resume lisinopril 10 mg daily   2. BNP / BMP today   3. F/U in 1 month    Overall the patient is stable from CV standpoint    Thank you for allowing to us to participate in the care of Raad Donald.     Reece Salas, CNP

## 2018-10-15 NOTE — PROGRESS NOTES
urinary frequency, urgency, incontinence hematuria or dysuria. SKIN: No cyanosis or skin lesions. MUSCULOSKELETAL: restless legs without Requip   NEUROLOGICAL: No syncope or TIA-like symptoms. PSYCHIATRIC: No anxiety, pain, insomnia or depression    Objective:   PHYSICAL EXAM:       Vitals:    10/15/18 1026 10/15/18 1036   BP: 118/64 122/68   Pulse: 63    SpO2: 98%    Weight: 186 lb 9.6 oz (84.6 kg)    Height: 5' 4\" (1.626 m)       VITALS:  /64   Pulse 63   Ht 5' 4\" (1.626 m)   Wt 186 lb 9.6 oz (84.6 kg)   SpO2 98%   BMI 32.03 kg/m²   CONSTITUTIONAL: Cooperative, no apparent distress, and appears well nourished / developed  NEUROLOGIC:  Awake and orientated to person, place and time. PSYCH: Calm affect; very pleasant lady. SKIN: Warm and dry. HEENT: Sclera non-icteric, normocephalic, neck supple, no elevation of JVP, normal carotid pulses with no bruits and thyroid normal size. LUNGS:  No increased work of breathing and clear to auscultation, no crackles or wheezing  CARDIOVASCULAR:  Regular rate 68 and rhythm with no murmurs, gallops, rubs, or abnormal heart sounds, normal PMI. The apical impulses not displaced  JVP less than 8 cm H2O  Heart tones are crisp and normal  Cervical veins are not engorged  The carotid upstroke is normal in amplitude and contour without delay or bruit  JVP is not elevated  ABDOMEN:  Normal bowel sounds, non-distended and non-tender to palpation  EXT: no edema, no calf tenderness. Pulses are present bilaterally.     DATA:    Lab Results   Component Value Date    ALT 13 03/15/2018    AST 25 03/15/2018    ALKPHOS 98 03/15/2018    BILITOT 0.5 03/15/2018     Lab Results   Component Value Date    CREATININE 0.98 07/13/2018    BUN 15 07/13/2018     07/13/2018    K 3.6 07/13/2018     07/13/2018    CO2 29 07/13/2018     Lab Results   Component Value Date    WBC 7.6 03/15/2018    HGB 12.7 03/15/2018    HCT 38.9 03/15/2018    MCV 80 03/15/2018     03/15/2018 PPM  ~2 episode of NSVT noted in July  ~no BB d/t orthostasis ; tolerating diltiazem   ~CHADs 5    3. Essential hypertension, benign  ~controlled        I had the opportunity to review the clinical symptoms and presentation of Lena Rodriguez. Plan:     1. Torsemide 20 mg daily    Resume lisinopril 10 mg daily   2. BNP / BMP today   3. F/U in 1 month    Overall the patient is stable from CV standpoint    I have addresed the patient's cardiac risk factors and adjusted pharmacologic treatment as needed. In addition, I have reinforced the need for patient directed risk factor modification. Further evaluation will be based upon the patient's clinical course and testing results. All questions and concerns were addressed to the patient. Alternatives to my treatment were discussed. The patient is not currently smoking. The risks related to smoking were reviewed with the patient. Recommend maintaining a smoke-free lifestyle. Patient is not on a beta-blocker: hx orthostatic BP  Patient is on an ace-i  Patient is not on a statin :neg CAD / diag for hyperlipidemia    Antiplatelet therapy / anti-coagulation has been recommended / prescribed for this patient. Education conducted on adverse reactions including bleeding was discussed. Angiotension inhibitor/angiotension receptor blocker has been prescribed / recommended for congestive heart failure. Daily weight, low sodium diet were discussed. Patient instructed to call the office with a weight gain: > 3 # over night or 5# in one week; swelling, SOB/orthopnea/PND    The patient verbalizes understanding not to stop medications without discussing with us. Discussed exercise: 30-60 minutes 7 days/week  Discussed Low saturated fat/SAAD diet. Estimated fluid intake / day < 2000 ml    Thank you for allowing to us to participate in the care of Lena Rodriguez.     Asha Watters, CNP    Documentation of today's visit sent to PCP

## 2018-10-17 RX ORDER — LISINOPRIL 10 MG/1
10 TABLET ORAL DAILY
Qty: 90 TABLET | Refills: 1 | Status: SHIPPED | OUTPATIENT
Start: 2018-10-17 | End: 2019-04-22

## 2018-11-11 DIAGNOSIS — I48.0 PAROXYSMAL ATRIAL FIBRILLATION (HCC): ICD-10-CM

## 2019-02-04 ENCOUNTER — OFFICE VISIT (OUTPATIENT)
Dept: CARDIOLOGY CLINIC | Age: 74
End: 2019-02-04
Payer: COMMERCIAL

## 2019-02-04 ENCOUNTER — PROCEDURE VISIT (OUTPATIENT)
Dept: CARDIOLOGY CLINIC | Age: 74
End: 2019-02-04
Payer: COMMERCIAL

## 2019-02-04 ENCOUNTER — HOSPITAL ENCOUNTER (OUTPATIENT)
Age: 74
Discharge: HOME | End: 2019-02-04
Payer: COMMERCIAL

## 2019-02-04 VITALS
WEIGHT: 186.8 LBS | OXYGEN SATURATION: 96 % | HEART RATE: 68 BPM | BODY MASS INDEX: 32.06 KG/M2 | DIASTOLIC BLOOD PRESSURE: 62 MMHG | SYSTOLIC BLOOD PRESSURE: 158 MMHG

## 2019-02-04 DIAGNOSIS — I50.32 DIASTOLIC CHF, CHRONIC (HCC): ICD-10-CM

## 2019-02-04 DIAGNOSIS — I48.0 PAROXYSMAL ATRIAL FIBRILLATION (HCC): ICD-10-CM

## 2019-02-04 DIAGNOSIS — Z95.0 CARDIAC RESYNCHRONIZATION THERAPY PACEMAKER (CRT-P) IN PLACE: Primary | ICD-10-CM

## 2019-02-04 DIAGNOSIS — I10 ESSENTIAL HYPERTENSION: ICD-10-CM

## 2019-02-04 DIAGNOSIS — R06.02 SOB (SHORTNESS OF BREATH): ICD-10-CM

## 2019-02-04 DIAGNOSIS — I44.2 COMPLETE HEART BLOCK (HCC): ICD-10-CM

## 2019-02-04 DIAGNOSIS — I50.32 CHRONIC DIASTOLIC CONGESTIVE HEART FAILURE (HCC): Primary | ICD-10-CM

## 2019-02-04 DIAGNOSIS — Z95.0 PACEMAKER: ICD-10-CM

## 2019-02-04 LAB
A/G RATIO: 1.2 (ref 1.1–2.2)
ALBUMIN SERPL-MCNC: 4.1 G/DL (ref 3.4–5)
ALP BLD-CCNC: 83 U/L (ref 40–129)
ALT SERPL-CCNC: 12 U/L (ref 10–40)
ANION GAP SERPL CALCULATED.3IONS-SCNC: 14 MMOL/L (ref 3–16)
AST SERPL-CCNC: 29 U/L (ref 15–37)
BILIRUB SERPL-MCNC: 0.8 MG/DL (ref 0–1)
BUN BLDV-MCNC: 14 MG/DL (ref 7–20)
CALCIUM SERPL-MCNC: 10 MG/DL (ref 8.3–10.6)
CHLORIDE BLD-SCNC: 103 MMOL/L (ref 99–110)
CO2: 25 MMOL/L (ref 21–32)
CREAT SERPL-MCNC: 0.9 MG/DL (ref 0.6–1.2)
GFR AFRICAN AMERICAN: >60
GFR NON-AFRICAN AMERICAN: >60
GLOBULIN: 3.3 G/DL
GLUCOSE BLD-MCNC: 91 MG/DL (ref 70–99)
POTASSIUM SERPL-SCNC: 3.8 MMOL/L (ref 3.5–5.1)
PRO-BNP: 1126 PG/ML (ref 0–449)
SODIUM BLD-SCNC: 142 MMOL/L (ref 136–145)
TOTAL PROTEIN: 7.4 G/DL (ref 6.4–8.2)

## 2019-02-04 PROCEDURE — 36415 COLL VENOUS BLD VENIPUNCTURE: CPT

## 2019-02-04 PROCEDURE — 80053 COMPREHEN METABOLIC PANEL: CPT

## 2019-02-04 PROCEDURE — 93290 INTERROG DEV EVAL ICPMS IP: CPT | Performed by: NURSE PRACTITIONER

## 2019-02-04 PROCEDURE — 83880 ASSAY OF NATRIURETIC PEPTIDE: CPT

## 2019-02-04 PROCEDURE — 99214 OFFICE O/P EST MOD 30 MIN: CPT | Performed by: NURSE PRACTITIONER

## 2019-02-04 PROCEDURE — 93280 PM DEVICE PROGR EVAL DUAL: CPT | Performed by: INTERNAL MEDICINE

## 2019-02-05 ENCOUNTER — TELEPHONE (OUTPATIENT)
Dept: CARDIOLOGY CLINIC | Age: 74
End: 2019-02-05

## 2019-02-05 PROBLEM — I44.2 COMPLETE HEART BLOCK (HCC): Status: ACTIVE | Noted: 2019-02-05

## 2019-02-26 RX ORDER — POTASSIUM CHLORIDE 20 MEQ/1
TABLET, EXTENDED RELEASE ORAL
Qty: 90 TABLET | Refills: 1 | Status: SHIPPED | OUTPATIENT
Start: 2019-02-26 | End: 2019-08-19 | Stop reason: SDUPTHER

## 2019-03-12 RX ORDER — TORSEMIDE 20 MG/1
TABLET ORAL
Qty: 90 TABLET | Refills: 1 | Status: SHIPPED | OUTPATIENT
Start: 2019-03-12 | End: 2019-06-13 | Stop reason: SDUPTHER

## 2019-03-28 DIAGNOSIS — I48.20 CHRONIC ATRIAL FIBRILLATION (HCC): ICD-10-CM

## 2019-03-29 DIAGNOSIS — I48.20 CHRONIC ATRIAL FIBRILLATION (HCC): ICD-10-CM

## 2019-03-29 RX ORDER — DILTIAZEM HYDROCHLORIDE 120 MG/1
CAPSULE, COATED, EXTENDED RELEASE ORAL
Qty: 30 CAPSULE | Refills: 1 | Status: SHIPPED | OUTPATIENT
Start: 2019-03-29 | End: 2019-03-29 | Stop reason: SDUPTHER

## 2019-04-01 RX ORDER — DILTIAZEM HYDROCHLORIDE 120 MG/1
CAPSULE, EXTENDED RELEASE ORAL
Qty: 90 CAPSULE | Refills: 1 | Status: SHIPPED | OUTPATIENT
Start: 2019-04-01 | End: 2019-06-03

## 2019-04-22 RX ORDER — LISINOPRIL 10 MG/1
10 TABLET ORAL DAILY
Qty: 30 TABLET | Refills: 3 | Status: SHIPPED | OUTPATIENT
Start: 2019-04-22 | End: 2019-08-17 | Stop reason: SDUPTHER

## 2019-05-09 ENCOUNTER — APPOINTMENT (OUTPATIENT)
Dept: GENERAL RADIOLOGY | Age: 74
DRG: 194 | End: 2019-05-09
Payer: COMMERCIAL

## 2019-05-09 ENCOUNTER — HOSPITAL ENCOUNTER (INPATIENT)
Age: 74
LOS: 6 days | Discharge: HOME | DRG: 194 | End: 2019-05-15
Attending: EMERGENCY MEDICINE | Admitting: HOSPITALIST
Payer: COMMERCIAL

## 2019-05-09 ENCOUNTER — APPOINTMENT (OUTPATIENT)
Dept: CT IMAGING | Age: 74
DRG: 194 | End: 2019-05-09
Payer: COMMERCIAL

## 2019-05-09 DIAGNOSIS — N17.9 ACUTE KIDNEY INJURY (HCC): ICD-10-CM

## 2019-05-09 DIAGNOSIS — J18.9 PNEUMONIA DUE TO ORGANISM: Primary | ICD-10-CM

## 2019-05-09 DIAGNOSIS — E86.0 DEHYDRATION: ICD-10-CM

## 2019-05-09 PROBLEM — J18.0 DIFFUSE PNEUMONIA: Status: ACTIVE | Noted: 2019-05-09

## 2019-05-09 LAB
A/G RATIO: 1 (ref 1.1–2.2)
ALBUMIN SERPL-MCNC: 3.9 G/DL (ref 3.4–5)
ALP BLD-CCNC: 92 U/L (ref 40–129)
ALT SERPL-CCNC: <5 U/L (ref 10–40)
ANION GAP SERPL CALCULATED.3IONS-SCNC: 14 MMOL/L (ref 3–16)
AST SERPL-CCNC: 15 U/L (ref 15–37)
BASOPHILS ABSOLUTE: 0.1 K/UL (ref 0–0.2)
BASOPHILS RELATIVE PERCENT: 0.7 %
BILIRUB SERPL-MCNC: 0.7 MG/DL (ref 0–1)
BUN BLDV-MCNC: 26 MG/DL (ref 7–20)
CALCIUM SERPL-MCNC: 10 MG/DL (ref 8.3–10.6)
CHLORIDE BLD-SCNC: 100 MMOL/L (ref 99–110)
CO2: 24 MMOL/L (ref 21–32)
CREAT SERPL-MCNC: 1.4 MG/DL (ref 0.6–1.2)
EOSINOPHILS ABSOLUTE: 0 K/UL (ref 0–0.6)
EOSINOPHILS RELATIVE PERCENT: 0.4 %
GFR AFRICAN AMERICAN: 44
GFR NON-AFRICAN AMERICAN: 37
GLOBULIN: 3.8 G/DL
GLUCOSE BLD-MCNC: 120 MG/DL (ref 70–99)
HCT VFR BLD CALC: 36.7 % (ref 36–48)
HEMOGLOBIN: 12.3 G/DL (ref 12–16)
LACTIC ACID: 0.8 MMOL/L (ref 0.4–2)
LYMPHOCYTES ABSOLUTE: 1.4 K/UL (ref 1–5.1)
LYMPHOCYTES RELATIVE PERCENT: 12.6 %
MCH RBC QN AUTO: 27.7 PG (ref 26–34)
MCHC RBC AUTO-ENTMCNC: 33.6 G/DL (ref 31–36)
MCV RBC AUTO: 82.5 FL (ref 80–100)
MONOCYTES ABSOLUTE: 0.9 K/UL (ref 0–1.3)
MONOCYTES RELATIVE PERCENT: 8.7 %
NEUTROPHILS ABSOLUTE: 8.4 K/UL (ref 1.7–7.7)
NEUTROPHILS RELATIVE PERCENT: 77.6 %
PDW BLD-RTO: 16.6 % (ref 12.4–15.4)
PLATELET # BLD: 352 K/UL (ref 135–450)
PMV BLD AUTO: 8 FL (ref 5–10.5)
POTASSIUM SERPL-SCNC: 4.8 MMOL/L (ref 3.5–5.1)
PROCALCITONIN: 0.07 NG/ML (ref 0–0.15)
RBC # BLD: 4.45 M/UL (ref 4–5.2)
SODIUM BLD-SCNC: 138 MMOL/L (ref 136–145)
TOTAL PROTEIN: 7.7 G/DL (ref 6.4–8.2)
WBC # BLD: 10.9 K/UL (ref 4–11)

## 2019-05-09 PROCEDURE — 71250 CT THORAX DX C-: CPT

## 2019-05-09 PROCEDURE — 2580000003 HC RX 258: Performed by: HOSPITALIST

## 2019-05-09 PROCEDURE — 92526 ORAL FUNCTION THERAPY: CPT

## 2019-05-09 PROCEDURE — 87581 M.PNEUMON DNA AMP PROBE: CPT

## 2019-05-09 PROCEDURE — 71046 X-RAY EXAM CHEST 2 VIEWS: CPT

## 2019-05-09 PROCEDURE — 87633 RESP VIRUS 12-25 TARGETS: CPT

## 2019-05-09 PROCEDURE — 85025 COMPLETE CBC W/AUTO DIFF WBC: CPT

## 2019-05-09 PROCEDURE — 2500000003 HC RX 250 WO HCPCS: Performed by: HOSPITALIST

## 2019-05-09 PROCEDURE — 87798 DETECT AGENT NOS DNA AMP: CPT

## 2019-05-09 PROCEDURE — 87040 BLOOD CULTURE FOR BACTERIA: CPT

## 2019-05-09 PROCEDURE — 80053 COMPREHEN METABOLIC PANEL: CPT

## 2019-05-09 PROCEDURE — 99285 EMERGENCY DEPT VISIT HI MDM: CPT

## 2019-05-09 PROCEDURE — 87486 CHLMYD PNEUM DNA AMP PROBE: CPT

## 2019-05-09 PROCEDURE — 94667 MNPJ CHEST WALL 1ST: CPT

## 2019-05-09 PROCEDURE — 1200000000 HC SEMI PRIVATE

## 2019-05-09 PROCEDURE — 83605 ASSAY OF LACTIC ACID: CPT

## 2019-05-09 PROCEDURE — 92610 EVALUATE SWALLOWING FUNCTION: CPT

## 2019-05-09 PROCEDURE — 96365 THER/PROPH/DIAG IV INF INIT: CPT

## 2019-05-09 PROCEDURE — 87641 MR-STAPH DNA AMP PROBE: CPT

## 2019-05-09 PROCEDURE — 84145 PROCALCITONIN (PCT): CPT

## 2019-05-09 PROCEDURE — 6360000002 HC RX W HCPCS: Performed by: EMERGENCY MEDICINE

## 2019-05-09 PROCEDURE — 96361 HYDRATE IV INFUSION ADD-ON: CPT

## 2019-05-09 PROCEDURE — 2580000003 HC RX 258: Performed by: EMERGENCY MEDICINE

## 2019-05-09 PROCEDURE — 87801 DETECT AGNT MULT DNA AMPLI: CPT

## 2019-05-09 PROCEDURE — 6370000000 HC RX 637 (ALT 250 FOR IP): Performed by: HOSPITALIST

## 2019-05-09 RX ORDER — SODIUM CHLORIDE 0.9 % (FLUSH) 0.9 %
10 SYRINGE (ML) INJECTION PRN
Status: DISCONTINUED | OUTPATIENT
Start: 2019-05-09 | End: 2019-05-15 | Stop reason: HOSPADM

## 2019-05-09 RX ORDER — IPRATROPIUM BROMIDE AND ALBUTEROL SULFATE 2.5; .5 MG/3ML; MG/3ML
1 SOLUTION RESPIRATORY (INHALATION) EVERY 4 HOURS PRN
Status: DISCONTINUED | OUTPATIENT
Start: 2019-05-09 | End: 2019-05-15 | Stop reason: HOSPADM

## 2019-05-09 RX ORDER — PANTOPRAZOLE SODIUM 40 MG/1
40 TABLET, DELAYED RELEASE ORAL
Status: DISCONTINUED | OUTPATIENT
Start: 2019-05-10 | End: 2019-05-15 | Stop reason: HOSPADM

## 2019-05-09 RX ORDER — 0.9 % SODIUM CHLORIDE 0.9 %
500 INTRAVENOUS SOLUTION INTRAVENOUS ONCE
Status: COMPLETED | OUTPATIENT
Start: 2019-05-09 | End: 2019-05-09

## 2019-05-09 RX ORDER — 0.9 % SODIUM CHLORIDE 0.9 %
1000 INTRAVENOUS SOLUTION INTRAVENOUS ONCE
Status: COMPLETED | OUTPATIENT
Start: 2019-05-09 | End: 2019-05-09

## 2019-05-09 RX ORDER — ASPIRIN 81 MG/1
81 TABLET ORAL DAILY
Status: DISCONTINUED | OUTPATIENT
Start: 2019-05-10 | End: 2019-05-15 | Stop reason: HOSPADM

## 2019-05-09 RX ORDER — SUCRALFATE 1 G/1
1 TABLET ORAL 4 TIMES DAILY
Status: DISCONTINUED | OUTPATIENT
Start: 2019-05-09 | End: 2019-05-15 | Stop reason: HOSPADM

## 2019-05-09 RX ORDER — CALCIUM CARBONATE 200(500)MG
1 TABLET,CHEWABLE ORAL 2 TIMES DAILY
Status: DISCONTINUED | OUTPATIENT
Start: 2019-05-09 | End: 2019-05-15 | Stop reason: HOSPADM

## 2019-05-09 RX ORDER — ROPINIROLE 0.5 MG/1
0.5 TABLET, FILM COATED ORAL DAILY
Status: DISCONTINUED | OUTPATIENT
Start: 2019-05-10 | End: 2019-05-15 | Stop reason: HOSPADM

## 2019-05-09 RX ORDER — SODIUM CHLORIDE 0.9 % (FLUSH) 0.9 %
10 SYRINGE (ML) INJECTION EVERY 12 HOURS SCHEDULED
Status: DISCONTINUED | OUTPATIENT
Start: 2019-05-09 | End: 2019-05-15 | Stop reason: HOSPADM

## 2019-05-09 RX ORDER — ONDANSETRON 2 MG/ML
4 INJECTION INTRAMUSCULAR; INTRAVENOUS EVERY 6 HOURS PRN
Status: DISCONTINUED | OUTPATIENT
Start: 2019-05-09 | End: 2019-05-15 | Stop reason: HOSPADM

## 2019-05-09 RX ORDER — DILTIAZEM HYDROCHLORIDE 120 MG/1
120 CAPSULE, COATED, EXTENDED RELEASE ORAL DAILY
Status: DISCONTINUED | OUTPATIENT
Start: 2019-05-10 | End: 2019-05-15 | Stop reason: HOSPADM

## 2019-05-09 RX ORDER — FLUTICASONE PROPIONATE 50 MCG
1 SPRAY, SUSPENSION (ML) NASAL DAILY
Status: DISCONTINUED | OUTPATIENT
Start: 2019-05-09 | End: 2019-05-15 | Stop reason: HOSPADM

## 2019-05-09 RX ORDER — SODIUM CHLORIDE 9 MG/ML
INJECTION, SOLUTION INTRAVENOUS CONTINUOUS
Status: DISCONTINUED | OUTPATIENT
Start: 2019-05-09 | End: 2019-05-10

## 2019-05-09 RX ORDER — IPRATROPIUM BROMIDE AND ALBUTEROL SULFATE 2.5; .5 MG/3ML; MG/3ML
1 SOLUTION RESPIRATORY (INHALATION)
Status: DISCONTINUED | OUTPATIENT
Start: 2019-05-09 | End: 2019-05-09

## 2019-05-09 RX ADMIN — SODIUM CHLORIDE 500 ML: 9 INJECTION, SOLUTION INTRAVENOUS at 14:49

## 2019-05-09 RX ADMIN — DOXYCYCLINE 100 MG: 100 INJECTION, POWDER, LYOPHILIZED, FOR SOLUTION INTRAVENOUS at 16:40

## 2019-05-09 RX ADMIN — SODIUM CHLORIDE 1000 ML: 9 INJECTION, SOLUTION INTRAVENOUS at 12:50

## 2019-05-09 RX ADMIN — PSYLLIUM HUSK 1 PACKET: 3.4 POWDER ORAL at 16:41

## 2019-05-09 RX ADMIN — FLUTICASONE PROPIONATE 1 SPRAY: 50 SPRAY, METERED NASAL at 16:40

## 2019-05-09 RX ADMIN — SUCRALFATE 1 G: 1 TABLET ORAL at 20:36

## 2019-05-09 RX ADMIN — SUCRALFATE 1 G: 1 TABLET ORAL at 16:40

## 2019-05-09 RX ADMIN — MEROPENEM 1 G: 1 INJECTION, POWDER, FOR SOLUTION INTRAVENOUS at 13:30

## 2019-05-09 RX ADMIN — METRONIDAZOLE 500 MG: 500 INJECTION, SOLUTION INTRAVENOUS at 18:51

## 2019-05-09 RX ADMIN — ANTACID TABLETS 500 MG: 500 TABLET, CHEWABLE ORAL at 20:37

## 2019-05-09 RX ADMIN — SODIUM CHLORIDE: 9 INJECTION, SOLUTION INTRAVENOUS at 16:40

## 2019-05-09 RX ADMIN — AZITHROMYCIN MONOHYDRATE 500 MG: 500 INJECTION, POWDER, LYOPHILIZED, FOR SOLUTION INTRAVENOUS at 14:50

## 2019-05-09 NOTE — PROGRESS NOTES
Speech Language Pathology  Facility/Department: [place] [address] - MED SURG/ORTHO   CLINICAL BEDSIDE SWALLOW EVALUATION    NAME: Almas Calderon  : 1945  MRN: [id number]    ADMISSION DATE: 2019  ADMITTING DIAGNOSIS: has Colitis; Hypokalemia; Near syncope; Essential hypertension; Vertigo; Dizziness; CHF exacerbation ([address] Chronic diastolic congestive heart failure ([address] Chronic atrial fibrillation ([address] Pacemaker; Anticoagulation goal of INR 2 to 3; Physical debility; History of breast cancer; ARF (acute renal failure) ([address] PATITO (acute kidney injury) ([address] Abdominal pain; Chest pain; Complete heart block ([address] and Diffuse pneumonia on their problem list.  ONSET DATE: admit date 19    Recent CT of Chest: (Date 19)   1. Multiple scattered nonspecific reticulonodular and tree-in-bud opacities   throughout the bilateral lung bases, most likely representing either   recurrent aspiration or multifocal pneumonia.  However, given that these are   new from prior examinations, recommend appropriate clinical treatment, and   short-term chest CT follow-up in 6-8 weeks to ensure resolution of these   opacities. 2. Stable small sliding-type hiatal hernia. Date of Eval: 2019  Evaluating Therapist: Damon Kimball    Current Diet level:  Current Diet : Regular  Current Liquid Diet : Thin      Primary Complaint  Patient Complaint: \"I've been getting choked a lot. \"    Pain:   pt denies    Reason for Referral  Almas Calderon was referred for a bedside swallow evaluation to assess the efficiency of her swallow function, identify signs and symptoms of aspiration and make recommendations regarding safe dietary consistencies, effective compensatory strategies, and safe eating environment. Impression  Dysphagia Diagnosis: Mild oral stage dysphagia; Suspected needs further assessment  Dysphagia Outcome Severity Scale: Level 3: Moderate dysphagia- Total assisstance, supervision or strategies.  Two or more diet consistencies restricted     Treatment Plan  Requires SLP Intervention: Yes  Duration/Frequency of Treatment: 3x/wk for 1 week, until 05/16/19          Recommended Diet and Intervention  Diet Solids Recommendation: Dysphagia II Mechanically Altered (until MBS can be completed)  Liquid Consistency Recommendation: Honey (until MBS can be completed)  Recommended Form of Meds: Meds in puree  Recommendations: Modified barium swallow study       Compensatory Swallowing Strategies  Compensatory Swallowing Strategies:   · Small bites/sips;  · Alternate solids and liquids;  · Eat/Feed slowly;  · No straws;  · Remain upright for 30-45 minutes after meals;  · Upright as possible for all oral intake    Treatment/Goals  Short-term Goals  Timeframe for Short-term Goals: 1 week, 3x/wk until 05/16/19  1. The patient will tolerate recommended diet without observed clinical signs of aspiration;  2. The patient will tolerate instrumental swallowing procedure    Long-term Goals  Timeframe for Long-term Goals: 1-2 weeks  1. Pt will tolerate recommended diet without observed s/s of aspiration. General  Chart Reviewed: Yes  Behavior/Cognition: Alert; Cooperative  O2 Device: None (Room air)  Follows Directions: Simple  Dentition: (edentulous top, own dentition (partial))  Patient Positioning: Upright in bed  Baseline Vocal Quality: Normal  Volitional Cough: Weak;Congested  Prior Dysphagia History: Pt reports that she has been having trouble with her swallowing for about a week. pt is a poor historian. Consistencies Administered: Mechanical soft;Puree; Thin - teaspoon; Thin - cup; Thin - straw;Nectar - teaspoon;Nectar - cup;Honey - teaspoon;Honey - cup       Vision/Hearing  Vision: Within Functional Limits  Hearing: Within functional limits    Oral Motor Deficits  Within functional limits    Oral Phase Dysfunction  Oral Phase: Exceptions  Impaired Mastication: Reg Solid  Oral Phase - Comment: Prolonged mastication of regular solid food trials related to lack of dentition. Otherwise, WFL. Indicators of Pharyngeal Phase Dysfunction  Pharyngeal Phase: Exceptions  Pharyngeal: Pt has had continuous coughing since coming from ER, per pt's RN. RR 24/min prior to po trials, with pt on RA and O2 sat of 97%. Coughing noted after most post trials, and cough is productive of sputum. Pt denies that coughing is related to swallowing issues, but very difficult to determine this at bedside due to tenacious nature of cough. Would favor downgrading diet as a precautions and completing MBS in order to determine nature of pharyngeal deficits. RR 28/min following po trials with O2 sat of 98%. Prognosis  Prognosis for safe diet advancement: fair  Barriers to reach goals: age;cognitive deficits  Consulted and agree with results and recommendations: Patient;RN    Education  Patient Education: Dysphagia Tx: Education with pt and RN regarding results of BSE, aspiration precautionn, and SLP plan of care  Patient Education Response: Verbalizes understanding;Needs reinforcement  Safety Devices in place: Yes  Type of devices: All fall risk precautions in place; Bed alarm in place;Call light within reach      Therapy Time  SLP Individual Minutes  Time In: 1816  Time Out: [address] [facility]  Minutes: [address] [facility]  [facility] [address] [id number]  Speech-Language Pathologist  (desk #): [phone]    5/9/2019 5:57 PM

## 2019-05-09 NOTE — H&P
tablet Take 0.5 mg by mouth daily    Historical Provider, MD   psyllium (KONSYL) 28.3 % PACK Take 1 packet by mouth daily    Historical Provider, MD   esomeprazole (NEXIUM) 40 MG delayed release capsule Take 40 mg by mouth every morning (before breakfast)    Historical Provider, MD   ondansetron (ZOFRAN) 4 MG tablet Take 4 mg by mouth every 6 hours as needed for Nausea or Vomiting    Historical Provider, MD   aspirin 81 MG tablet Take 81 mg by mouth daily    Historical Provider, MD   DOCUSATE SODIUM PO Take 8.6-50 mg by mouth Takes 2 tablets at bedtime    Historical Provider, MD   albuterol sulfate  (90 BASE) MCG/ACT inhaler Inhale 2 puffs into the lungs every 6 hours as needed for Wheezing 2/16/17   Aliza Kang DO   acetaminophen (TYLENOL) 325 MG tablet Take 650 mg by mouth every 4 hours as needed for Pain    Historical Provider, MD   sucralfate (CARAFATE) 1 GM tablet Take 1 g by mouth 4 times daily     Historical Provider, MD   CALCIUM CARBONATE-VITAMIN D PO Take 600 mg by mouth 2 times daily     Historical Provider, MD   meclizine (ANTIVERT) 25 MG tablet TAKE 1 TABLET BY MOUTH EVERY DAY  Patient taking differently: prn 8/31/15   Emil Check, APRN - CNP   fluticasone (FLONASE) 50 MCG/ACT nasal spray 1 spray by Nasal route daily. Historical Provider, MD   calcium carbonate (TUMS) 500 MG chewable tablet Take 1 tablet by mouth 2 times daily. 12/23/10   Historical Provider, MD       Allergies:  Amoxicillin-pot clavulanate; Betadine [povidone iodine]; Cipro xr; Morphine; Phenergan [promethazine hcl]; Promethazine; Sulfamethoxazole-trimethoprim; Sulfamethoxazole-trimethoprim; Tape [adhesive tape]; Cephalexin; Codeine; and Prochlorperazine edisylate    Social History:      The patient currently lives at home    TOBACCO:   reports that she quit smoking about 25 years ago. Her smoking use included cigarettes.  She has never used smokeless tobacco.  ETOH:   reports that she does not drink alcohol. Family History:      Reviewed in detail and Positive as follows:        Problem Relation Age of Onset    Arthritis Paternal Grandfather     Heart Disease Mother     High Blood Pressure Mother     Heart Disease Father     High Blood Pressure Father        REVIEW OF SYSTEMS:   Pertinent positives as noted in the HPI. All other systems reviewed and negative. PHYSICAL EXAM PERFORMED:    /64   Pulse 63   Temp 98.5 °F (36.9 °C) (Oral)   Resp 18   Ht 5' 5\" (1.651 m)   Wt 177 lb 4 oz (80.4 kg)   SpO2 98%   BMI 29.50 kg/m²     General appearance:  No apparent distress, appears stated age and cooperative. HEENT:  Normal cephalic, atraumatic without obvious deformity. Pupils equal, round, and reactive to light. Extra ocular muscles intact. Conjunctivae/corneas clear. Neck: Supple, with full range of motion. No jugular venous distention. Trachea midline. Respiratory:  Diminished BS, no wheezing apprec  Cardiovascular:  Regular rate and rhythm with normal S1/S2 without murmurs, rubs or gallops. Abdomen: Soft, non-tender, non-distended with normal bowel sounds. Musculoskeletal:  No clubbing, cyanosis or edema bilaterally. Full range of motion without deformity. Skin: Skin color, texture, turgor normal.  No rashes or lesions. Neurologic:  Neurovascularly intact without any focal sensory/motor deficits. Psychiatric:  Alert and oriented, thought content appropriate, normal insight  Capillary Refill: Brisk,< 3 seconds   Peripheral Pulses: +2 palpable, equal bilaterally       Labs:     Recent Labs     05/09/19  1115   WBC 10.9   HGB 12.3   HCT 36.7        Recent Labs     05/09/19  1115      K 4.8      CO2 24   BUN 26*   CREATININE 1.4*   CALCIUM 10.0     Recent Labs     05/09/19  1115   AST 15   ALT <5*   BILITOT 0.7   ALKPHOS 92     No results for input(s): INR in the last 72 hours. No results for input(s): [place] Daily in the last 72 hours.     Urinalysis: Lab Results   Component Value Date    NITRU Negative 05/28/2016    WBCUA 20-50 05/10/2016    BACTERIA 2+ 05/10/2016    RBCUA 0-2 05/10/2016    BLOODU Negative 05/28/2016    SPECGRAV <=1.005 05/28/2016    GLUCOSEU Negative 05/28/2016    GLUCOSEU NEGATIVE 04/24/2012       Radiology:     CT CHEST WO CONTRAST   Preliminary Result   1. Multiple scattered nonspecific reticulonodular and tree-in-bud opacities   throughout the bilateral lung bases, most likely representing either   recurrent aspiration or multifocal pneumonia. However, given that these are   new from prior examinations, recommend appropriate clinical treatment, and   short-term chest CT follow-up in 6-8 weeks to ensure resolution of these   opacities. 2. Stable small sliding-type hiatal hernia. XR CHEST STANDARD (2 VW)   Final Result   No acute cardiopulmonary disease             ASSESSMENT:    1. Bilateral PNA, at risk for viral process vs atypical PNA. Cannot exclude aspiration though suspect her current \"choking\" compliant is due to post nasal drip, current infectious process  2. PATITO  3. HTN, benign  4. Atrial fibrillation    PLAN:    Admit to tele  AM labs  Check PCT, viral PCR, MRSA nasal swab, strep & legionella urine ag    Add IV Doxycycline (multiple listed allergies) + Flagyl    Hold home Lisinopril, KCL, Torsemide for now. Gentle IVF with NS at 50 ml/hr for now    Cont home Dilt, Xarelto (if renal func not improved by am, will adj dose)    DVT Prophylaxis: Xarelto  Diet: DIET GENERAL;  Code Status: Full Code    PT/OT Eval Status: Will order    [facility] in 2-3 days    Discussed with pt in detail       Annabel Rivas MD    Thank you Lucrecia Duckworth MD for the opportunity to be involved in this patient's care. If you have any questions or concerns please feel free to contact me at [phone].

## 2019-05-09 NOTE — FLOWSHEET NOTE
4 Eyes Skin Assessment     The patient is being assess for   Admission    I agree that 2 RN's have performed a thorough Head to Toe Skin Assessment on the patient. ALL assessment sites listed below have been assessed. Areas assessed by both nurses:   [x]   Head, Face, and Ears   [x]   Shoulders, Back, and Chest, Abdomen  [x]   Arms, Elbows, and Hands   [x]   Coccyx, Sacrum, and Ischium  [x]   Legs, Feet, and Heels        Buttocks red but blanchable, callous inner ball of foot and outer ball of foot bilaterally and to bottom of big toe right foot. **SHARE this note so that the co-signing nurse is able to place an eSignature**    Co-signer eSignature: {Esignature:[id number]}    Does the Patient have Skin Breakdown?   No          Mariano Prevention initiated:  No   Wound Care Orders initiated:  No      [facility] nurse consulted for Pressure Injury (Stage 3,4, Unstageable, DTI, NWPT, Complex wounds)and New or Established Ostomies:  No      Primary Nurse eSignature: Electronically signed by Chelo Mcpherson RN on 5/9/19 at 5:45 PM

## 2019-05-09 NOTE — ED PROVIDER NOTES
[facility] Emergency Department    CHIEF COMPLAINT  Cough (Patient stated shes had cough for approx. 1 week)      HISTORY OF PRESENT ILLNESS  Toshia Bradshaw is a 66 y.o. female presenting to the ER with a cough. Patient has had a cough now for the past week or so. She feels short of breath with exertion. She denies any chest pain. She feels feverish at times. Patient is coughing up yellow sputum at times. No other complaints, modifying factors or associated symptoms. I have reviewed the following from the nursing documentation. Past Medical History:   Diagnosis Date    Allergic     Anemia     Atrial fibrillation (HCC)     Breast cancer ([facility] [address]     s/p left mastectomy 2001    Cardiac pacemaker in situ 10/1/2015 left    AV node ablation and Bi-V PPM ([employer])    Cellulitis     right leg.     Hypertension     Murmur     Osteoarthritis     Sinus drainage      Past Surgical History:   Procedure Laterality Date    HERNIA REPAIR      MASTECTOMY Left     TOTAL KNEE ARTHROPLASTY      bilateral.     Family History   Problem Relation Age of Onset    Arthritis Paternal Grandfather     Heart Disease Mother     High Blood Pressure Mother     Heart Disease Father     High Blood Pressure Father      Social History     Socioeconomic History    Marital status:      Spouse name: Not on file    Number of children: Not on file    Years of education: Not on file    Highest education level: Not on file   Occupational History    Not on file   Social Needs    Financial resource strain: Not on file    Food insecurity:     Worry: Not on file     Inability: Not on file    Transportation needs:     Medical: Not on file     Non-medical: Not on file   Tobacco Use    Smoking status: Former Smoker     Types: Cigarettes    Smokeless tobacco: Never Used    Tobacco comment: 5 cigarettes per day for 3-4 years   Substance and Sexual Activity    Alcohol use: No     Alcohol/week: 0.0 oz    Drug use: No    Sexual activity: Not on file   Lifestyle    Physical activity:     Days per week: Not on file     Minutes per session: Not on file    Stress: Not on file   Relationships    Social connections:     Talks on phone: Not on file     Gets together: Not on file     Attends Latter day service: Not on file     Active member of club or organization: Not on file     Attends meetings of clubs or organizations: Not on file     Relationship status: Not on file    Intimate partner violence:     Fear of current or ex partner: Not on file     Emotionally abused: Not on file     Physically abused: Not on file     Forced sexual activity: Not on file   Other Topics Concern    Not on file   Social History Narrative    Not on file     Current Facility-Administered Medications   Medication Dose Route Frequency Provider Last Rate Last Dose    azithromycin (ZITHROMAX) 500 mg in D5W 250ml addavial  500 mg Intravenous Once Walterine Horde, DO        meropenem (MERREM) 1 g in sodium chloride 0.9 % 100 mL IVPB (mini-bag)  1 g Intravenous Once Walterine Horde,  mL/hr at 05/09/19 1330 1 g at 05/09/19 1330    0.9 % sodium chloride bolus  500 mL Intravenous Once Walterine Horde, DO         Current Outpatient Medications   Medication Sig Dispense Refill    lisinopril (PRINIVIL;ZESTRIL) 10 MG tablet TAKE 1 TABLET BY MOUTH DAILY 30 tablet 3    diltiazem (TIAZAC) 120 MG extended release capsule TAKE ONE CAPSULE BY MOUTH DAILY 90 capsule 1    torsemide (DEMADEX) 20 MG tablet TAKE 1 TABLET BY MOUTH EVERY DAY 90 tablet 1    potassium chloride (KLOR-CON M) 20 MEQ extended release tablet TAKE 1 TABLET BY MOUTH EVERY DAY 90 tablet 1    rivaroxaban (XARELTO) 20 MG TABS tablet TAKE 1 TABLET BY MOUTH DAILY WITH BREAKFAST 90 tablet 3    rOPINIRole (REQUIP) 0.5 MG tablet Take 0.5 mg by mouth daily      psyllium (KONSYL) 28.3 % PACK Take 1 packet by mouth daily      esomeprazole (NEXIUM) 40 MG delayed release capsule Take 40 mg by mouth every morning (before breakfast)      ondansetron (ZOFRAN) 4 MG tablet Take 4 mg by mouth every 6 hours as needed for Nausea or Vomiting      aspirin 81 MG tablet Take 81 mg by mouth daily      DOCUSATE SODIUM PO Take 8.6-50 mg by mouth Takes 2 tablets at bedtime      albuterol sulfate  (90 BASE) MCG/ACT inhaler Inhale 2 puffs into the lungs every 6 hours as needed for Wheezing 1 Inhaler 0    acetaminophen (TYLENOL) 325 MG tablet Take 650 mg by mouth every 4 hours as needed for Pain      sucralfate (CARAFATE) 1 GM tablet Take 1 g by mouth 4 times daily       CALCIUM CARBONATE-VITAMIN D PO Take 600 mg by mouth 2 times daily       meclizine (ANTIVERT) 25 MG tablet TAKE 1 TABLET BY MOUTH EVERY DAY (Patient taking differently: prn) 30 tablet 0    fluticasone (FLONASE) 50 MCG/ACT nasal spray 1 spray by Nasal route daily.  calcium carbonate (TUMS) 500 MG chewable tablet Take 1 tablet by mouth 2 times daily. Allergies   Allergen Reactions    Amoxicillin-Pot Clavulanate     Betadine [Povidone Iodine]     Cipro Xr     Morphine     Phenergan [Promethazine Hcl]     Promethazine     Sulfamethoxazole-Trimethoprim     Sulfamethoxazole-Trimethoprim     Tape [Adhesive Tape] Itching    Cephalexin Rash    Codeine Nausea And Vomiting    Prochlorperazine Edisylate Anxiety       REVIEW OF SYSTEMS  10 systems reviewed, pertinent positives per HPI otherwise noted to be negative. PHYSICAL EXAM  /70   Pulse 60   Temp 98.1 °F (36.7 °C) (Oral)   Resp 17   Ht 5' 5\" (1.651 m)   Wt 175 lb (79.4 kg)   SpO2 98%   BMI 29.12 kg/m²   GENERAL APPEARANCE: Awake and alert. Cooperative  HEAD: Normocephalic. Atraumatic. EYES: PERRL. EOM's grossly intact. ENT: Mucous membranes are dry  NECK: Supple. Non-tender  HEART: RRR. LUNGS: Respirations are mildly labored. Bibasilar crackles  ABDOMEN: Soft. Non-distended. Non-tender. No masses. No organomegaly.  No guarding or COMPARISON: 06/03/2017 HISTORY: ORDERING SYSTEM PROVIDED HISTORY: cough TECHNOLOGIST PROVIDED HISTORY: Reason for exam:->cough Ordering Physician Provided Reason for Exam: cough Acuity: Acute Type of Exam: Initial FINDINGS: A left subclavian transvenous pacemaker is in place. No focal airspace consolidation, pleural effusion or pneumothorax is demonstrated. The heart is enlarged. There is no evidence of edema. No acute osseous abnormality is seen. No acute cardiopulmonary disease     Ct Chest Wo Contrast    Result Date: 5/9/2019  EXAMINATION: CT OF THE CHEST WITHOUT CONTRAST 5/9/2019 12:29 pm TECHNIQUE: CT of the chest was performed without the administration of intravenous contrast. Multiplanar reformatted images are provided for review. Dose modulation, iterative reconstruction, and/or weight based adjustment of the mA/kV was utilized to reduce the radiation dose to as low as reasonably achievable. COMPARISON: 02/12/2017, 05/17/2011 HISTORY: ORDERING SYSTEM PROVIDED HISTORY: SHORTNESS OF BREATH TECHNOLOGIST PROVIDED HISTORY: Ordering Physician Provided Reason for Exam: SOB, Cough Acuity: Acute Type of Exam: Initial FINDINGS: Mediastinum: The visualized thyroid is unremarkable. There is no pathologic lymphadenopathy. There is atherosclerotic disease of the thoracic aorta, without evidence of aneurysm. The main and central pulmonary arteries are at the upper limits of normal in size. There is mild coronary atherosclerosis. Pacemaker leads are noted. No pericardial abnormality is identified. Lungs/pleura: The central airways are patent. Mild diffuse bronchial wall thickening is evident, especially within the bilateral lung bases. There are scattered somewhat nonspecific reticulonodular and mildly consolidative opacities within the bilateral lung bases, primarily within the right middle lobe and bilateral lower lobes, with a few tree-in-bud opacities within the right middle lobe evident.   This most likely reflects either recurrent aspiration or multifocal pneumonia. These opacities are new in comparison with prior examinations. The largest nodular opacity lies within the right middle lobe, and measures approximately 1.1 x 0.7 cm. No additional focus of airspace consolidation is identified. There is no evidence of a pneumothorax or pleural effusion. There is mild parenchymal scarring within the lingula, unchanged from prior exams. Scattered calcified granulomata are noted. There is chronic biapical pleural and parenchymal scarring. No additional suspicious pulmonary nodule or mass identified. Upper Abdomen: There is a stable small sliding-type hiatal hernia. The visualized adrenal glands are unremarkable. The remainder of the upper abdomen is unremarkable. Soft Tissues/Bones: There are multiple chronic wedge compression deformities within the lower thoracic and upper lumbar spine, unchanged from prior examination. There is multilevel degenerative change throughout the spine. A few scattered vertebral body hemangiomata are noted. No osteolytic or osteoblastic lesion is seen. 1. Multiple scattered nonspecific reticulonodular and tree-in-bud opacities throughout the bilateral lung bases, most likely representing either recurrent aspiration or multifocal pneumonia. However, given that these are new from prior examinations, recommend appropriate clinical treatment, and short-term chest CT follow-up in 6-8 weeks to ensure resolution of these opacities. 2. Stable small sliding-type hiatal hernia. ED COURSE/MDM  Patient seen and evaluated. Old records reviewed. Labs and imaging reviewed and results discussed with patient. This is a 41-year-old female presenting with a cough and shortness of breath. Patient was found to have multifocal pneumonia on CT scan likely representing recurrent aspiration or multifocal pneumonia. Patient was given IV antibiotics.   She has multiple antibiotic allergies and risks and benefits of the antibiotics were considered with Zithromax and meropenem appearing to be the most distantly related to her allergiies while being efficacious for pneumonia. Patient was also given a breathing treatment and she does feel symptomatically better. Patient will require admission. She was given IV fluid as she appears mildly clinically dehydrated and has some evidence of acute kidney injury. CLINICAL IMPRESSION  1. Pneumonia due to organism    2. Acute kidney injury ([address]    3. Dehydration        Blood pressure 114/70, pulse 60, temperature 98.1 °F (36.7 °C), temperature source Oral, resp. rate 17, height 5' 5\" (1.651 m), weight 175 lb (79.4 kg), SpO2 98 %. Blaze Melvin was admitted in fair condition.          Gloria Mojica DO  05/09/19 [id number]

## 2019-05-09 NOTE — PLAN OF CARE
Problem: Nutrition  Intervention: Aspiration precautions  Note:   SLP completed evaluation. Please refer to notes in EMR. Problem: Nutrition  Intervention: Swallowing evaluation  Note:   SLP completed evaluation. Please refer to notes in EMR. Lola Goncalves, [id number] [facility] [id number]  Speech-Language Pathologist  (desk #): [phone]

## 2019-05-10 ENCOUNTER — APPOINTMENT (OUTPATIENT)
Dept: GENERAL RADIOLOGY | Age: 74
DRG: 194 | End: 2019-05-10
Payer: COMMERCIAL

## 2019-05-10 LAB
ANION GAP SERPL CALCULATED.3IONS-SCNC: 10 MMOL/L (ref 3–16)
BASOPHILS ABSOLUTE: 0.1 K/UL (ref 0–0.2)
BASOPHILS RELATIVE PERCENT: 0.5 %
BUN BLDV-MCNC: 11 MG/DL (ref 7–20)
CALCIUM SERPL-MCNC: 9.1 MG/DL (ref 8.3–10.6)
CHLORIDE BLD-SCNC: 107 MMOL/L (ref 99–110)
CO2: 23 MMOL/L (ref 21–32)
CREAT SERPL-MCNC: 0.7 MG/DL (ref 0.6–1.2)
EOSINOPHILS ABSOLUTE: 0.1 K/UL (ref 0–0.6)
EOSINOPHILS RELATIVE PERCENT: 0.9 %
GFR AFRICAN AMERICAN: >60
GFR NON-AFRICAN AMERICAN: >60
GLUCOSE BLD-MCNC: 103 MG/DL (ref 70–99)
HCT VFR BLD CALC: 32.5 % (ref 36–48)
HEMOGLOBIN: 11 G/DL (ref 12–16)
L. PNEUMOPHILA SEROGP 1 UR AG: NORMAL
LYMPHOCYTES ABSOLUTE: 1.8 K/UL (ref 1–5.1)
LYMPHOCYTES RELATIVE PERCENT: 19.6 %
MCH RBC QN AUTO: 27.9 PG (ref 26–34)
MCHC RBC AUTO-ENTMCNC: 33.7 G/DL (ref 31–36)
MCV RBC AUTO: 82.9 FL (ref 80–100)
MONOCYTES ABSOLUTE: 0.9 K/UL (ref 0–1.3)
MONOCYTES RELATIVE PERCENT: 9.7 %
MRSA SCREEN RT-PCR: NORMAL
NEUTROPHILS ABSOLUTE: 6.5 K/UL (ref 1.7–7.7)
NEUTROPHILS RELATIVE PERCENT: 69.3 %
PDW BLD-RTO: 16.1 % (ref 12.4–15.4)
PLATELET # BLD: 278 K/UL (ref 135–450)
PMV BLD AUTO: 8.3 FL (ref 5–10.5)
POTASSIUM REFLEX MAGNESIUM: 4.1 MMOL/L (ref 3.5–5.1)
RBC # BLD: 3.92 M/UL (ref 4–5.2)
REPORT: NORMAL
RESPIRATORY PANEL PCR: NORMAL
SODIUM BLD-SCNC: 140 MMOL/L (ref 136–145)
STREP PNEUMONIAE ANTIGEN, URINE: NORMAL
WBC # BLD: 9.4 K/UL (ref 4–11)

## 2019-05-10 PROCEDURE — 92611 MOTION FLUOROSCOPY/SWALLOW: CPT

## 2019-05-10 PROCEDURE — 2580000003 HC RX 258: Performed by: HOSPITALIST

## 2019-05-10 PROCEDURE — 97161 PT EVAL LOW COMPLEX 20 MIN: CPT

## 2019-05-10 PROCEDURE — 6370000000 HC RX 637 (ALT 250 FOR IP): Performed by: HOSPITALIST

## 2019-05-10 PROCEDURE — 97530 THERAPEUTIC ACTIVITIES: CPT

## 2019-05-10 PROCEDURE — 36415 COLL VENOUS BLD VENIPUNCTURE: CPT

## 2019-05-10 PROCEDURE — 87449 NOS EACH ORGANISM AG IA: CPT

## 2019-05-10 PROCEDURE — 74230 X-RAY XM SWLNG FUNCJ C+: CPT

## 2019-05-10 PROCEDURE — 80048 BASIC METABOLIC PNL TOTAL CA: CPT

## 2019-05-10 PROCEDURE — 97165 OT EVAL LOW COMPLEX 30 MIN: CPT

## 2019-05-10 PROCEDURE — 1200000000 HC SEMI PRIVATE

## 2019-05-10 PROCEDURE — 85025 COMPLETE CBC W/AUTO DIFF WBC: CPT

## 2019-05-10 PROCEDURE — 2500000003 HC RX 250 WO HCPCS: Performed by: HOSPITALIST

## 2019-05-10 PROCEDURE — 92526 ORAL FUNCTION THERAPY: CPT

## 2019-05-10 RX ADMIN — ROPINIROLE HYDROCHLORIDE 0.5 MG: 0.5 TABLET, FILM COATED ORAL at 08:31

## 2019-05-10 RX ADMIN — SUCRALFATE 1 G: 1 TABLET ORAL at 20:15

## 2019-05-10 RX ADMIN — SODIUM CHLORIDE, PRESERVATIVE FREE 10 ML: 5 INJECTION INTRAVENOUS at 20:16

## 2019-05-10 RX ADMIN — PANTOPRAZOLE SODIUM 40 MG: 40 TABLET, DELAYED RELEASE ORAL at 05:59

## 2019-05-10 RX ADMIN — RIVAROXABAN 20 MG: 20 TABLET, FILM COATED ORAL at 17:26

## 2019-05-10 RX ADMIN — ANTACID TABLETS 500 MG: 500 TABLET, CHEWABLE ORAL at 20:14

## 2019-05-10 RX ADMIN — DILTIAZEM HYDROCHLORIDE 120 MG: 120 CAPSULE, COATED, EXTENDED RELEASE ORAL at 08:32

## 2019-05-10 RX ADMIN — SUCRALFATE 1 G: 1 TABLET ORAL at 08:32

## 2019-05-10 RX ADMIN — DOXYCYCLINE 100 MG: 100 INJECTION, POWDER, LYOPHILIZED, FOR SOLUTION INTRAVENOUS at 04:42

## 2019-05-10 RX ADMIN — FLUTICASONE PROPIONATE 1 SPRAY: 50 SPRAY, METERED NASAL at 08:32

## 2019-05-10 RX ADMIN — METRONIDAZOLE 500 MG: 500 INJECTION, SOLUTION INTRAVENOUS at 13:10

## 2019-05-10 RX ADMIN — DOXYCYCLINE 100 MG: 100 INJECTION, POWDER, LYOPHILIZED, FOR SOLUTION INTRAVENOUS at 17:26

## 2019-05-10 RX ADMIN — SUCRALFATE 1 G: 1 TABLET ORAL at 13:10

## 2019-05-10 RX ADMIN — ASPIRIN 81 MG: 81 TABLET, COATED ORAL at 08:31

## 2019-05-10 RX ADMIN — ANTACID TABLETS 500 MG: 500 TABLET, CHEWABLE ORAL at 08:31

## 2019-05-10 RX ADMIN — SODIUM CHLORIDE, PRESERVATIVE FREE 10 ML: 5 INJECTION INTRAVENOUS at 08:32

## 2019-05-10 RX ADMIN — SUCRALFATE 1 G: 1 TABLET ORAL at 17:26

## 2019-05-10 RX ADMIN — METRONIDAZOLE 500 MG: 500 INJECTION, SOLUTION INTRAVENOUS at 03:06

## 2019-05-10 RX ADMIN — PSYLLIUM HUSK 1 PACKET: 3.4 POWDER ORAL at 08:32

## 2019-05-10 NOTE — PROGRESS NOTES
RESPIRATORY THERAPY ASSESSMENT    Name:  Romel [address] Record Number:  [id number]  Age: 66 y.o. Gender: female  : 1945  Today's Date:  2019  Room:  0528/0528-01    Assessment     Is the patient being admitted for a COPD or Asthma exacerbation? No   (If yes the patient will be seen every 4 hours for the first 24 hours and then reassessed)    Patient Admission Diagnosis      Allergies  Allergies   Allergen Reactions    Amoxicillin-Pot Clavulanate     Betadine [Povidone Iodine]     Cipro Xr     Morphine     Phenergan [Promethazine Hcl]     Promethazine     Sulfamethoxazole-Trimethoprim     Sulfamethoxazole-Trimethoprim     Tape [Adhesive Tape] Itching    Cephalexin Rash    Codeine Nausea And Vomiting    Prochlorperazine Edisylate Anxiety       Minimum Predicted Vital Capacity:     886           Actual Vital Capacity:      750              Pulmonary History:No history  Home Oxygen Therapy:  room air  Home Respiratory Therapy:Albuterol/Ipratropium Bromide HHN as needed   Current Respiratory Therapy:  duoneb as needed           Respiratory Severity Index(RSI)   Patients with orders for inhalation medications, oxygen, or any therapeutic treatment modality will be placed on Respiratory Protocol. They will be assessed with the first treatment and at least every 72 hours thereafter. The following severity scale will be used to determine frequency of treatment intervention.     Smoking History: Smoking History Less than 1ppd or less than 15 pack year = 1    Social History  Social History     Tobacco Use    Smoking status: Former Smoker     Types: Cigarettes     Last attempt to quit: 1994     Years since quittin.0    Smokeless tobacco: Never Used    Tobacco comment: 5 cigarettes per day for 3-4 years   Substance Use Topics    Alcohol use: No     Alcohol/week: 0.0 oz    Drug use: No       Recent Surgical History: None = 0  Past Surgical History  Past Surgical History: Procedure Laterality Date    HERNIA REPAIR      MASTECTOMY Left     TOTAL KNEE ARTHROPLASTY      bilateral.       Level of Consciousness: Alert, Oriented, and Cooperative = 0    Level of Activity: Walking unassisted = 0    Respiratory Pattern: Regular Pattern; RR 8-20 = 0    Breath Sounds: Diminished unilaterally = 1    Sputum  Sputum Color: Yellow, Green,  , Sputum How Obtained: Spontaneous cough  Cough: Strong, spontaneous, non-productive = 0    Vital Signs   /64   Pulse 63   Temp 98.5 °F (36.9 °C) (Oral)   Resp 18   Ht 5' 5\" (1.651 m)   Wt 177 lb 4 oz (80.4 kg)   SpO2 98%   BMI 29.50 kg/m²   SPO2 (COPD values may differ): Greater than or equal to 92% on room air = 0    Peak Flow (asthma only): not applicable = 0    RSI: 0-4 = See once and convert to home regimen or discontinue and 5-6 = Q4hr PRN (every four hours as needed) for dyspnea        Plan       Goals: medication delivery, mobilize retained secretions, volume expansion and improve oxygenation    Patient/caregiver was educated on the proper method of use for Respiratory Care Devices:  Yes      Level of patient/caregiver understanding able to:   ? Verbalize understanding   ? Demonstrate understanding       ? Teach back        ? Needs reinforcement       ? No available caregiver               ? Other:     Response to education:  Very Good     Is patient being placed on Home Treatment Regimen? Yes     Does the patient have everything they need prior to discharge? Yes     Comments: patient assessed and chart reviewed     Plan of Care: patient will be placed on home regimen unless condition worsens     Electronically signed by Rayna Denny RCP on 5/9/2019 at 8:05 PM    Respiratory Protocol Guidelines     1. Assessment and treatment by Respiratory Therapy will be initiated for medication and therapeutic interventions upon initiation of aerosolized medication.   2. Physician will be contacted for respiratory rate (RR) greater than 35 breaths per minute. Therapy will be held for heart rate (HR) greater than 140 beats per minute, pending direction from physician. 3. Bronchodilators will be administered via Metered Dose Inhaler (MDI) with spacer when the following criteria are met:  a. Alert and cooperative     b. HR < 140 bpm  c. RR < 30 bpm                d. Can demonstrate a 2-3 second inspiratory hold  4. Bronchodilators will be administered via Hand Held Nebulizer [facility]) to patients when ANY of the following criteria are met  a. Incognizant or uncooperative          b. Patients treated with HHN at Home        c. Unable to demonstrate proper use of MDI with spacer     d. RR > 30 bpm   5. Bronchodilators will be delivered via Metered Dose Inhaler (MDI), HHN, Aerogen to intubated patients on mechanical ventilation. 6. Inhalation medication orders will be delivered and/or substituted as outlined below. Aerosolized Medications Ordering and Administration Guidelines:    1. All Medications will be ordered by a physician, and their frequency and/or modality will be adjusted as defined by the patients Respiratory Severity Index (RSI) score. 2. If the patient does not have documented COPD, consider discontinuing anticholinergics when RSI is less than 9.  3. If the bronchospasm worsens (increased RSI), then the bronchodilator frequency can be increased to a maximum of every 4 hours. If greater than every 4 hours is required, the physician will be contacted. 4. If the bronchospasm improves, the frequency of the bronchodilator can be decreased, based on the patient's RSI, but not less than home treatment regimen frequency. 5. Bronchodilator(s) will be discontinued if patient has a RSI less than 9 and has received no scheduled or as needed treatment for 72  Hrs. Patients Ordered on a Mucolytic Agent:    1. Must always be administered with a bronchodilator.     2. Discontinue if patient experiences worsened bronchospasm, or secretions have lessened to the point that the patient is able to clear them with a cough. Anti-inflammatory and Combination Medications:    1. If the patient lacks prior history of lung disease, is not using inhaled anti-inflammatory medication at home, and lacks wheezing by examination or by history for at least 24 hours, contact physician for possible discontinuation.

## 2019-05-10 NOTE — PLAN OF CARE
Problem: Falls - Risk of:  Goal: Will remain free from falls  Description  Will remain free from falls  5/10/2019 0355 by Partha Hinton RN  Outcome: Ongoing  5/9/2019 1750 by Micha Miramontes RN  Outcome: Ongoing    Pt. Has non-skid socks on, call light in reach, side rails up x-2, bed alarm on, patient instructed to use call light with concerns and questions. Pt. Alert & oriented x-4. Pt. Able to turn self side to side in bed as tolerated. Pt. Is x-1 assist with stedy to the bathroom. Will continue to monitor.     Goal: Absence of physical injury  Description  Absence of physical injury  5/10/2019 0355 by Partha Hinton RN  Outcome: Ongoing  5/9/2019 1750 by Micha Miramontes RN  Outcome: Ongoing

## 2019-05-10 NOTE — PROGRESS NOTES
Hospitalist Progress Note      PCP: Joann Franco MD    Date of Admission: 5/9/2019    Subjective: + throat fullness. Cough mildly improved. Strength a bit better. Medications:  Reviewed    Infusion Medications   Scheduled Medications    aspirin  81 mg Oral Daily    calcium carbonate  1 tablet Oral BID    diltiazem  120 mg Oral Daily    pantoprazole  40 mg Oral QAM AC    fluticasone  1 spray Nasal Daily    psyllium  1 packet Oral Daily    rivaroxaban  20 mg Oral Daily    rOPINIRole  0.5 mg Oral Daily    sucralfate  1 g Oral 4x Daily    sodium chloride flush  10 mL Intravenous 2 times per day    doxycycline (VIBRAMYCIN) IV  100 mg Intravenous Q12H    metroNIDAZOLE  500 mg Intravenous Q8H     PRN Meds: sodium chloride flush, magnesium hydroxide, ondansetron, ipratropium-albuterol      Intake/Output Summary (Last 24 hours) at 5/10/2019 1525  Last data filed at 5/10/2019 0937  Gross per 24 hour   Intake 240 ml   Output --   Net 240 ml       Physical Exam Performed:    /60   Pulse 66   Temp 99 °F (37.2 °C) (Oral)   Resp 18   Ht 5' 5\" (1.651 m)   Wt 177 lb 4 oz (80.4 kg)   SpO2 96%   BMI 29.50 kg/m²     General appearance: No apparent distress, appears stated age and cooperative. HEENT: Pupils equal, round, and reactive to light. Conjunctivae/corneas clear. Neck: Supple, with full range of motion. No jugular venous distention. Trachea midline. Respiratory:  Normal respiratory effort. Clear to auscultation, bilaterally without Rales/Wheezes/Rhonchi. Cardiovascular: Regular rate and rhythm with normal S1/S2 without murmurs, rubs or gallops. Abdomen: Soft, non-tender, non-distended with normal bowel sounds. Musculoskeletal: No clubbing, cyanosis or edema bilaterally. Full range of motion without deformity. Skin: Skin color, texture, turgor normal.  No rashes or lesions. Neurologic:  Neurovascularly intact without any focal sensory/motor deficits.  Cranial nerves: II-XII intact,

## 2019-05-10 NOTE — PROGRESS NOTES
Physical Therapy    Facility/Department: [place] [address] - MED SURG/ORTHO  Initial Assessment    NAME: Gerhardt Norrie  : 1945  MRN: [id number]    Date of Service: 5/10/2019    Discharge Recommendations:  24 hour supervision or assist, Home with Home health PT   PT Equipment Recommendations  Equipment Needed: No    Assessment   Body structures, Functions, Activity limitations: Decreased functional mobility ; Decreased balance  Assessment: Pt is a 67 y/o female who presents with pneumonia. Pt currently requires CGA for transfers and short distance ambulation. Pt would benefit from continued skilled PT to address these limitations and allow for safe return home. Recommend home PT and 24hr supervision upon discharge. Treatment Diagnosis: impaired gait and transfers  Prognosis: Good  Decision Making: Low Complexity  Patient Education: Educated on role of PT, use of call light, safety with mobility  REQUIRES PT FOLLOW UP: Yes  Activity Tolerance  Activity Tolerance: Patient Tolerated treatment well;Patient limited by fatigue       Patient Diagnosis(es): The primary encounter diagnosis was Pneumonia due to organism. Diagnoses of Acute kidney injury ([facility] [address] and Dehydration were also pertinent to this visit. has a past medical history of Allergic, Anemia, Atrial fibrillation ([facility] [address] Breast cancer [facility]), Cardiac pacemaker in situ, Cellulitis, Diffuse pneumonia, Hypertension, Murmur, Osteoarthritis, and Sinus drainage. has a past surgical history that includes Mastectomy (Left); Total knee arthroplasty; and hernia repair.     Restrictions  Restrictions/Precautions  Restrictions/Precautions: Fall Risk, Swallowing - Thickened Liquids, Modified Diet  Position Activity Restriction  Other position/activity restrictions: up with assist  Subjective  General  Chart Reviewed: Yes  Patient assessed for rehabilitation services?: Yes  Family / Caregiver Present: No  Referring Practitioner: Caleb Haines MD  Referral Date : 05/09/19  Diagnosis: diffuse pneumonia  Follows Commands: Within Functional Limits  General Comment  Comments: Pt supine in bed upon arrival. RN cleared pt for therapy  Subjective  Subjective: Pt agreeable to evaluation with encouragement. Pain Screening  Patient Currently in Pain: Denies  Vital Signs  Patient Currently in Pain: Denies     Social/Functional History  Social/Functional History  Lives With: Daughter(and grandchildren)  Type of Home: House  Home Layout: One level  Home Access: Stairs to enter without rails  Entrance Stairs - Number of Steps: 1-2 JOCELYN  Home Equipment: Rolling walker, Cane  ADL Assistance: Independent  Homemaking Responsibilities: No(daughter does cooking, cleaning and laundry)  Ambulation Assistance: Independent(with RW)  Transfer Assistance: Independent  Objective     Strength RLE  Strength RLE: Exception  Comment: grossly 4/5  Strength LLE  Strength LLE: Exception  Comment: grossly 4/5     Sensation  Overall Sensation Status: WFL  Bed mobility  Supine to Sit: Supervision(HOB elevated, increased time to complete)  Sit to Supine: Unable to assess(up in chair at end of session)  Transfers  Sit to Stand: Contact guard assistance  Stand to sit: Contact guard assistance  Ambulation  Ambulation?: Yes  Ambulation 1  Surface: level tile  Device: Rolling Walker  Assistance: Contact guard assistance  Quality of Gait: decreased foot clearance bilaterally, steady  Distance: 3' to chair  Stairs/Curb  Stairs?: No     Balance  Sitting - Static: Good  Sitting - Dynamic: Good  Standing - Static: Fair;+  Standing - Dynamic: Fair;+        Plan   Plan  Times per week: 3-5x/wk  Current Treatment Recommendations: Strengthening, Balance Training, Functional Mobility Training, Transfer Training, Gait Training, Patient/Caregiver Education & Training  Safety Devices  Type of devices:  All fall risk precautions in place, Call light within reach, Chair alarm in place, Gait belt, Patient at risk for falls, Nurse notified, Left in chair    AM-PAC Score  AM-PAC Inpatient Mobility Raw Score : 20  AM-PAC Inpatient T-Scale Score : 47.67  Mobility Inpatient CMS 0-100% Score: 35.83  Mobility Inpatient CMS G-Code Modifier : CJ        Goals  Short term goals  Time Frame for Short term goals: 5 days  Short term goal 1: Pt will perform bed mobility mod I  Short term goal 2: Pt will perform transfers mod I  Short term goal 3: Pt will ambulate 48' with RW and supervision  Short term goal 4: By 5/12/19, pt will tolerate 15 reps of LE ther ex for improved strength and activity tolerance  Patient Goals   Patient goals : \"to go back home\"       Therapy Time   Individual Concurrent Group Co-treatment   Time In 0848         Time Out 0906         Minutes 18         Timed Code Treatment Minutes: [address], [place] [id number]

## 2019-05-10 NOTE — PROCEDURES
INSTRUMENTAL SWALLOW REPORT  MODIFIED BARIUM SWALLOW    NAME: Fransico Brower   : 1945  MRN: [id number]       Date of Eval: 5/10/2019     Ordering Physician: Dr. Kierra Latham  Radiologist: Dr. Jack Brady     Referring Diagnosis(es): Referring Diagnosis: Pneumonia    Past Medical History:  has a past medical history of Allergic, Anemia, Atrial fibrillation ([facility] [address] Breast cancer [facility]), Cardiac pacemaker in situ, Cellulitis, Diffuse pneumonia, Hypertension, Murmur, Osteoarthritis, and Sinus drainage. Past Surgical History:  has a past surgical history that includes Mastectomy (Left); Total knee arthroplasty; and hernia repair. Current Diet Solid Consistency: Dysphagia II Mechanically Altered  Current Diet Liquid Consistency: Honey       Type of Study: Initial MBS       Recent CXR/CT of Chest: 2019  Impression   1. Multiple scattered nonspecific reticulonodular and tree-in-bud opacities   throughout the bilateral lung bases, most likely representing either   recurrent aspiration or multifocal pneumonia.  However, given that these are   new from prior examinations, recommend appropriate clinical treatment, and   short-term chest CT follow-up in 6-8 weeks to ensure resolution of these   opacities. 2. Stable small sliding-type hiatal hernia. Patient Complaints/Reason for Referral:  Fransico Brower was referred for a MBS to assess the efficiency of his/her swallow function, assess for aspiration, and to make recommendations regarding safe dietary consistencies, effective compensatory strategies, and safe eating environment. Patient complaints: The patient is a 65 y/o female admitted to [facility] with sx's including worsening SOB and cough. Pt admitted with bilateral PNA. Pt with tenacious cough at baseline adn was seen for BSE .  Due to baseline cough, SLP unable to determine aspiration clinically at bedside, indicating the need for instrumental.    Onset of problem: 2019       General One - two diet consistencies restricted  Penetration-Aspiration Scale (PAS): 1 - Material does not enter the airway    Recommended Diet:  Solid consistency: Dysphagia II Mechanically Altered  Liquid consistency: Thin  Liquid administration via: Cup    Medication administration: PO    Safe Swallow Protocol:  Supervision: Distant  Compensatory Swallowing Strategies: Alternate solids and liquids;Small bites/sips;Swallow 2 times per bite/sip;Eat/Feed slowly;Upright as possible for all oral intake;Remain upright for 30-45 minutes after meals        Recommendations/Treatment  Requires SLP Intervention: Yes        D/C Recommendations: To be determined  Postural Changes and/or Swallow Maneuvers: Upright 90 degrees;Upright 30 min after meal      Recommended Exercises:    Therapeutic Interventions: Diet tolerance monitoring;Patient/Family education;Oral care         Education: Images and recommendations were reviewed with the patient and RN following this exam.   Patient Education: SLP educating the patient regarding protocols and procedures for MBS, rationale of MBS, results of MBS, diet recommendaitons, and SLP POC. Patient Education Response: Verbalizes understanding;Needs reinforcement    Prognosis  Prognosis for safe diet advancement: fair  Barriers to reach goals: age;cognitive deficits  Duration/Frequency of Treatment  Duration/Frequency of Treatment: 3x/wk for 1 week, until 05/16/19  Safety Devices  Safety Devices in place: Not Applicable      Goals:    Long Term:   Timeframe for Long-term Goals: 1-2 weeks  Goal 1: Pt will tolerate recommended diet without observed s/s of aspiration.   Short Term:  Goal 1: The patient will tolerate mechanical solids with no clinical signs or symptoms of aspiration 10/10  Goal 2: The patient will tolerate thin liquids with no clinical signs or symptoms of aspiration 10/10  Goal 3: The patient will recall and perform compensatory strategies, no cues        Oral Preparation / Oral Phase  Oral Phase: Impaired  Oral Phase - Major Contributing Deficits  Poor Mastication: Mechanical soft solid  Weak Lingual Manipulation: Mechanical soft solid; Thin cup  Lingual Pumping: Thin cup;Mechanical soft solid;Puree  Piecemeal Swallowing: Mechanical soft solid;Puree; Thin cup  Delayed Trigger of Palatal Elevation: Thin cup;Puree  Reduced Tongue Base Retraction: All        Pharyngeal Phase  Pharyngeal Phase: Impaired  Pharyngeal Phase - Major Contributing Deficits  Delayed Swallow Initiation: All  Premature Spillage to Valleculae: Thin cup;Puree  Premature Spillage to Pyriform: Thin cup;Puree  Reduced Laryngeal Elevation: Thin cup;Puree  Reduced Thyrohyoid Approximation: All  Pooling Valleculae: Thin cup;Puree  Pooling Pyriform:  Thin cup  Reduced Tongue Base: All      Esophageal Phase  Esophageal Screen: WNL        Pain   Patient Currently in Pain: Denies         Therapy Time:   Individual   Time In [facility]0   Time Out [address] [facility], 5/10/2019, 1:31 PM  Gemma York M.A., [address] [facility] #[facility]  Speech-Language Pathologist

## 2019-05-10 NOTE — PROGRESS NOTES
Does patient need to be on continuous IV fluids? Pt. admitted for pneumonia & has been using bathroom almost every hour. Just thought I would ask. Thanks! Perfect Serve sent to Lois Pena NP. Will continue to monitor.

## 2019-05-10 NOTE — CARE COORDINATION
CASE MANAGEMENT INITIAL ASSESSMENT      Reviewed chart and met with patient today, re: potential dc planning needs  Explained Case Management role/services. Family present: none  Primary contact information: Shavon Higuera, daughter [phone]    Admit date/status: 05/09/2019  Diagnosis: Diffuse pneumonia    Insurance: [address] required for SNF - Y      3 night stay required - N    Living arrangements, Adls, care needs, prior to admission: lives in ranch home with basement. Daughter and two grandsons live with her. Transportation: [address] at home: Walker_X_Cane_X_RTS_X_ BSC__Shower Chair_X_  02__ HHN__ CPAP__  BiPap__  Hospital Bed__ W/C___ Other grab bars in shower    Services in the home and/or outpatient, prior to admission: none    PT/OT recs: [address] Exemption Notification (HEN): n/a    Barriers to discharge: none identfied    Plan/comments: Pt denies needs and refuses HHC or SNF. Pt states, \" Right now, I dont want any help. \" When inquiring on reasoning, the Pt became upset stating, \" they always come in and  me. They always want me to get rid of my stuff. My 10year old grandson is entitled to his toys. They always want me to get rid of my stuff, and I have memories. Some of that stuff I've had for 70 something years. \" RN CM reassured the Pt and offered explanation that if the home is cluttered they may be concerned for her safety and recommend going through things to de clutter to make space in hallways and rooms so that she is not at increased risk for falling. Pt seems to understand, however still declines [facility] at this time.

## 2019-05-10 NOTE — PROGRESS NOTES
Speech Language Pathology  MBSS       SLP acknowledged order for MBSS, spoke with radiology. Unable to establish time for MBSS at this time d/t radiology scheduling.   ST to continue to follow and complete MBSS later this date as able.     Thank you,  Alexandre Maloney M.S. [address] [facility]  Speech-language pathologist  [phone]  Phone: [phone]

## 2019-05-11 LAB — REPORT: NORMAL

## 2019-05-11 PROCEDURE — 6360000002 HC RX W HCPCS: Performed by: HOSPITALIST

## 2019-05-11 PROCEDURE — 1200000000 HC SEMI PRIVATE

## 2019-05-11 PROCEDURE — 2500000003 HC RX 250 WO HCPCS: Performed by: HOSPITALIST

## 2019-05-11 PROCEDURE — 36415 COLL VENOUS BLD VENIPUNCTURE: CPT

## 2019-05-11 PROCEDURE — 87040 BLOOD CULTURE FOR BACTERIA: CPT

## 2019-05-11 PROCEDURE — 2580000003 HC RX 258: Performed by: HOSPITALIST

## 2019-05-11 PROCEDURE — 6370000000 HC RX 637 (ALT 250 FOR IP): Performed by: HOSPITALIST

## 2019-05-11 PROCEDURE — 92526 ORAL FUNCTION THERAPY: CPT

## 2019-05-11 RX ORDER — GUAIFENESIN 600 MG/1
600 TABLET, EXTENDED RELEASE ORAL 2 TIMES DAILY
Status: DISCONTINUED | OUTPATIENT
Start: 2019-05-11 | End: 2019-05-15 | Stop reason: HOSPADM

## 2019-05-11 RX ORDER — AZITHROMYCIN 250 MG/1
250 TABLET, FILM COATED ORAL DAILY
Status: DISCONTINUED | OUTPATIENT
Start: 2019-05-11 | End: 2019-05-12

## 2019-05-11 RX ORDER — DIPHENHYDRAMINE HYDROCHLORIDE 50 MG/ML
25 INJECTION INTRAMUSCULAR; INTRAVENOUS EVERY 6 HOURS PRN
Status: DISCONTINUED | OUTPATIENT
Start: 2019-05-11 | End: 2019-05-15 | Stop reason: HOSPADM

## 2019-05-11 RX ADMIN — PANTOPRAZOLE SODIUM 40 MG: 40 TABLET, DELAYED RELEASE ORAL at 09:25

## 2019-05-11 RX ADMIN — Medication: at 12:53

## 2019-05-11 RX ADMIN — DILTIAZEM HYDROCHLORIDE 120 MG: 120 CAPSULE, COATED, EXTENDED RELEASE ORAL at 09:22

## 2019-05-11 RX ADMIN — GUAIFENESIN 600 MG: 600 TABLET, EXTENDED RELEASE ORAL at 22:45

## 2019-05-11 RX ADMIN — ANTACID TABLETS 500 MG: 500 TABLET, CHEWABLE ORAL at 22:45

## 2019-05-11 RX ADMIN — DOXYCYCLINE 100 MG: 100 INJECTION, POWDER, LYOPHILIZED, FOR SOLUTION INTRAVENOUS at 04:02

## 2019-05-11 RX ADMIN — ROPINIROLE HYDROCHLORIDE 0.5 MG: 0.5 TABLET, FILM COATED ORAL at 09:22

## 2019-05-11 RX ADMIN — PSYLLIUM HUSK 1 PACKET: 3.4 POWDER ORAL at 09:23

## 2019-05-11 RX ADMIN — SUCRALFATE 1 G: 1 TABLET ORAL at 17:09

## 2019-05-11 RX ADMIN — AZITHROMYCIN 250 MG: 250 TABLET, FILM COATED ORAL at 11:01

## 2019-05-11 RX ADMIN — FLUTICASONE PROPIONATE 1 SPRAY: 50 SPRAY, METERED NASAL at 09:23

## 2019-05-11 RX ADMIN — SUCRALFATE 1 G: 1 TABLET ORAL at 12:51

## 2019-05-11 RX ADMIN — SUCRALFATE 1 G: 1 TABLET ORAL at 09:23

## 2019-05-11 RX ADMIN — SODIUM CHLORIDE, PRESERVATIVE FREE 10 ML: 5 INJECTION INTRAVENOUS at 09:23

## 2019-05-11 RX ADMIN — ASPIRIN 81 MG: 81 TABLET, COATED ORAL at 09:22

## 2019-05-11 RX ADMIN — CEFTRIAXONE SODIUM 1 G: 1 INJECTION, POWDER, FOR SOLUTION INTRAMUSCULAR; INTRAVENOUS at 17:09

## 2019-05-11 RX ADMIN — SUCRALFATE 1 G: 1 TABLET ORAL at 22:45

## 2019-05-11 RX ADMIN — GUAIFENESIN 600 MG: 600 TABLET, EXTENDED RELEASE ORAL at 12:53

## 2019-05-11 RX ADMIN — SODIUM CHLORIDE, PRESERVATIVE FREE 10 ML: 5 INJECTION INTRAVENOUS at 22:46

## 2019-05-11 RX ADMIN — RIVAROXABAN 20 MG: 20 TABLET, FILM COATED ORAL at 18:22

## 2019-05-11 ASSESSMENT — PAIN SCALES - GENERAL: PAINLEVEL_OUTOF10: 2

## 2019-05-11 ASSESSMENT — PAIN DESCRIPTION - PAIN TYPE: TYPE: ACUTE PAIN

## 2019-05-11 ASSESSMENT — PAIN DESCRIPTION - LOCATION: LOCATION: ABDOMEN

## 2019-05-11 ASSESSMENT — PAIN DESCRIPTION - ORIENTATION: ORIENTATION: LEFT;LOWER

## 2019-05-11 NOTE — PROGRESS NOTES
Hospitalist Progress Note      PCP: Renea Interiano MD    Date of Admission: 5/9/2019    Subjective: + emesis this am, thinks its from food she was ordered. Ongoing cough, mild productive. Persistent sinus congestion. Had redness near IV site when Doxy infused. Medications:  Reviewed    Infusion Medications   Scheduled Medications    azithromycin  250 mg Oral Daily    LIP BALM   Topical Daily    guaiFENesin  600 mg Oral BID    aspirin  81 mg Oral Daily    calcium carbonate  1 tablet Oral BID    diltiazem  120 mg Oral Daily    pantoprazole  40 mg Oral QAM AC    fluticasone  1 spray Nasal Daily    psyllium  1 packet Oral Daily    rivaroxaban  20 mg Oral Daily    rOPINIRole  0.5 mg Oral Daily    sucralfate  1 g Oral 4x Daily    sodium chloride flush  10 mL Intravenous 2 times per day     PRN Meds: diphenhydrAMINE, sodium chloride flush, magnesium hydroxide, ondansetron, ipratropium-albuterol      Intake/Output Summary (Last 24 hours) at 5/11/2019 1124  Last data filed at 5/10/2019 1847  Gross per 24 hour   Intake 480 ml   Output --   Net 480 ml       Physical Exam Performed:    /71   Pulse 61   Temp 98.6 °F (37 °C) (Oral)   Resp 16   Ht 5' 5\" (1.651 m)   Wt 177 lb 4 oz (80.4 kg)   SpO2 96%   BMI 29.50 kg/m²     General appearance: No apparent distress, appears stated age and cooperative. HEENT: Pupils equal, round, and reactive to light. Conjunctivae/corneas clear. Neck: Supple, with full range of motion. No jugular venous distention. Trachea midline. Respiratory:  Normal respiratory effort. Clear to auscultation, bilaterally without Rales/Wheezes/Rhonchi. Cardiovascular: Regular rate and rhythm with normal S1/S2 without murmurs, rubs or gallops. Abdomen: Soft, non-tender, non-distended with normal bowel sounds. Musculoskeletal: No clubbing, cyanosis or edema bilaterally. Full range of motion without deformity.   Skin: Skin color, texture, turgor normal.  No rashes or lesions. Neurologic:  Neurovascularly intact without any focal sensory/motor deficits. Cranial nerves: II-XII intact, grossly non-focal.  Psychiatric: Alert and oriented, thought content appropriate, normal insight  Capillary Refill: Brisk,< 3 seconds   Peripheral Pulses: +2 palpable, equal bilaterally       Labs:   Recent Labs     05/09/19  1115 05/10/19  0648   WBC 10.9 9.4   HGB 12.3 11.0*   HCT 36.7 32.5*    278     Recent Labs     05/09/19  1115 05/10/19  0648    140   K 4.8 4.1    107   CO2 24 23   BUN 26* 11   CREATININE 1.4* 0.7   CALCIUM 10.0 9.1     Recent Labs     05/09/19  1115   AST 15   ALT <5*   BILITOT 0.7   ALKPHOS 92     No results for input(s): INR in the last 72 hours. No results for input(s): [place] in the last 72 hours. Urinalysis:      Lab Results   Component Value Date    NITRU Negative 05/28/2016    WBCUA 20-50 05/10/2016    BACTERIA 2+ 05/10/2016    RBCUA 0-2 05/10/2016    BLOODU Negative 05/28/2016    SPECGRAV <=1.005 05/28/2016    GLUCOSEU Negative 05/28/2016    GLUCOSEU NEGATIVE 04/24/2012       Radiology:  FL MODIFIED BARIUM SWALLOW W VIDEO   Preliminary Result   No evidence of penetration of the larynx or aspiration as described above. Please see separate speech pathology report for full discussion of findings   and recommendations. CT CHEST WO CONTRAST   Final Result   1. Multiple scattered nonspecific reticulonodular and tree-in-bud opacities   throughout the bilateral lung bases, most likely representing either   recurrent aspiration or multifocal pneumonia. However, given that these are   new from prior examinations, recommend appropriate clinical treatment, and   short-term chest CT follow-up in 6-8 weeks to ensure resolution of these   opacities. 2. Stable small sliding-type hiatal hernia. XR CHEST STANDARD (2 VW)   Final Result   No acute cardiopulmonary disease             Assessment  1.  Bilateral PNA, suspect viral process vs atypical PNA. PCT lower end, making viral more likely. 2. PATITO  3. HTN, benign  4. Atrial fibrillation     PLAN:    Await further cx data     Transition from empiric IV Doxycycline (multiple listed allergies) to PO Azithromycin to complete abx course  Add Mucinex.     Hold home Lisinopril, KCL, Torsemide for now.     Cont home Dilt, Xarelto    Cont prn Zofran. Monitor for further N/V     DVT Prophylaxis: Xarelto  Diet: DIET GENERAL;  Code Status: Full Code     PT/OT Eval Status:  Following, marcela Quijano in 1-2 days     Discussed with pt, RN         James Barreto MD

## 2019-05-11 NOTE — PROGRESS NOTES
Speech Language Pathology  Facility/Department: [place] [id number] - MED SURG/ORTHO  Dysphagia Daily Treatment Note    NAME: Angella Victoria  : 1945  MRN: [id number]    Patient Diagnosis(es):   Patient Active Problem List    Diagnosis Date Noted    Chronic diastolic congestive heart failure ([facility]      Priority: Medium    Essential hypertension 2015     Priority: Medium    Near syncope      Priority: Low    Diffuse pneumonia 2019    Complete heart block ([facility] 2019    Chest pain 2017    Abdominal pain 2016    PATITO (acute kidney injury) ([facility] [facility] 05/10/2016    [facility] ([facility] 2016    History of breast cancer 2015    Anticoagulation goal of INR 2 to 3     Physical debility     Pacemaker     Chronic atrial fibrillation ([facility]     CHF exacerbation ([facility] 2015    Dizziness     Vertigo 2015    Colitis 2012    Hypokalemia 2012     Allergies: Allergies   Allergen Reactions    Amoxicillin-Pot Clavulanate     Betadine [Povidone Iodine]     Cipro Xr     Morphine     Phenergan [Promethazine Hcl]     Promethazine     Sulfamethoxazole-Trimethoprim     Sulfamethoxazole-Trimethoprim     Tape [Adhesive Tape] Itching    Cephalexin Rash    Codeine Nausea And Vomiting    Prochlorperazine Edisylate Anxiety     Subjective: RN ok'd SLP entry to room, assisted with repositioning of pt to chair for breakfast.     Pain: Mild discomfort, repositioned to pt's satisfaction    Current Diet:    Diet Tolerance:  Patient tolerating current diet level without signs/symptoms of penetration / aspiration. P.O. Trials: Thin   x 4oz via small straw sip   Nectar    No trials   Honey    No trials   Puree   x Grits, 6oz   Solid   x Ground sausage x2 bites     Dysphagia Therapy:  Pt repositioned to upright in chair with assist from RN. Pt with intermittent cough noted even prior to trials, which pt has exhibited since admission.  Pt appeared to tolerate pureed trials with no overt s/s of aspiration. No throat clear, no change in vocal quality (continues to be hoarse). Pt appeared to tolerate thin liquids via small sips. Again, persistent cough, which occurred prior to PO, as well as during and after. Recommend continue current diet, which was recommended following MBSS yesterday. Short-term Goals  Timeframe for Short-term Goals: 1 week, 3x/wk until 05/16/19  1. The patient will tolerate recommended diet without observed clinical signs of aspiration;  05/11 MBSS completed yesterday with recommendations for thin liquids (small sips), and Mech soft diet. Pt with intermittent cough throughout session, including prior to PO trials, making bedside assessment more difficult. Pt appeared to tolerate puree, thin liquids by small sips. Pt expressed dislike of ground meats, due to pieces being \"tough\". Reviewed recommendations to add gravies, etc.  2.   05/11 GOAL MET, MBSS completed yesterday, with no aspiration/penetration. Recommendation for Dys2 / Thin liquids. Premature spillage to pyriform with thin liquids, placing pt at elevated risk of aspiration. 3: The patient will tolerate mechanical solids with no clinical signs or symptoms of aspiration 10/10.  05/11 pt took very few trials of mech soft, stating it was too tough, needs further assessment  4: The patient will tolerate thin liquids with no clinical signs or symptoms of aspiration 10/10  05/11 pt appeared to tolerate  5: The patient will recall and perform compensatory strategies, no cues  05/11 Reviewed recommended safe swallowing strategies with pt; pt verbalized understanding however needs reinforcement    Long-term Goals  Timeframe for Long-term Goals: 1-2 weeks  1. Pt will tolerate recommended diet without observed s/s of aspiration. 05/11 ongoing, progressing.     Recommendations:  Continue Dysphagia 2 / Thin liquids--small sips    Patient/Family/Caregiver Education:  Educated regarding strategies for safe swallowing, results of MBSS    Compensatory Strategies: Alternate solids and liquids;Small bites/sips;Swallow 2 times per bite/sip;Eat/Feed slowly;Upright as possible for all oral intake;Remain upright for 30-45 minutes after meals      Plan:    Continued Dysphagia treatment with goals per plan of care. Discharge Recommendations: To be determined based upon discharge disposition    If pt discharges from hospital prior to Speech/Swallowing discharge, this note serves as tx and discharge summary.      Total Treatment Time:  5404-2369, 32 min  Dys Tx     Janey Chawla M.A., [address]  Speech Language Pathologist

## 2019-05-12 LAB
ANION GAP SERPL CALCULATED.3IONS-SCNC: 8 MMOL/L (ref 3–16)
BASOPHILS ABSOLUTE: 0.1 K/UL (ref 0–0.2)
BASOPHILS RELATIVE PERCENT: 0.8 %
BUN BLDV-MCNC: 6 MG/DL (ref 7–20)
CALCIUM SERPL-MCNC: 9.6 MG/DL (ref 8.3–10.6)
CHLORIDE BLD-SCNC: 107 MMOL/L (ref 99–110)
CO2: 25 MMOL/L (ref 21–32)
CREAT SERPL-MCNC: <0.5 MG/DL (ref 0.6–1.2)
EOSINOPHILS ABSOLUTE: 0.2 K/UL (ref 0–0.6)
EOSINOPHILS RELATIVE PERCENT: 2.4 %
GFR AFRICAN AMERICAN: >60
GFR NON-AFRICAN AMERICAN: >60
GLUCOSE BLD-MCNC: 100 MG/DL (ref 70–99)
HCT VFR BLD CALC: 34.1 % (ref 36–48)
HEMOGLOBIN: 11.3 G/DL (ref 12–16)
LYMPHOCYTES ABSOLUTE: 1.8 K/UL (ref 1–5.1)
LYMPHOCYTES RELATIVE PERCENT: 21.5 %
MAGNESIUM: 1.6 MG/DL (ref 1.8–2.4)
MCH RBC QN AUTO: 27.4 PG (ref 26–34)
MCHC RBC AUTO-ENTMCNC: 33.2 G/DL (ref 31–36)
MCV RBC AUTO: 82.6 FL (ref 80–100)
MONOCYTES ABSOLUTE: 0.6 K/UL (ref 0–1.3)
MONOCYTES RELATIVE PERCENT: 7.4 %
NEUTROPHILS ABSOLUTE: 5.6 K/UL (ref 1.7–7.7)
NEUTROPHILS RELATIVE PERCENT: 67.9 %
PDW BLD-RTO: 16.5 % (ref 12.4–15.4)
PLATELET # BLD: 326 K/UL (ref 135–450)
PMV BLD AUTO: 7.9 FL (ref 5–10.5)
POTASSIUM SERPL-SCNC: 4.1 MMOL/L (ref 3.5–5.1)
RBC # BLD: 4.13 M/UL (ref 4–5.2)
SODIUM BLD-SCNC: 140 MMOL/L (ref 136–145)
WBC # BLD: 8.2 K/UL (ref 4–11)

## 2019-05-12 PROCEDURE — 97110 THERAPEUTIC EXERCISES: CPT

## 2019-05-12 PROCEDURE — 94640 AIRWAY INHALATION TREATMENT: CPT

## 2019-05-12 PROCEDURE — 36415 COLL VENOUS BLD VENIPUNCTURE: CPT

## 2019-05-12 PROCEDURE — 6360000002 HC RX W HCPCS: Performed by: HOSPITALIST

## 2019-05-12 PROCEDURE — 2580000003 HC RX 258: Performed by: HOSPITALIST

## 2019-05-12 PROCEDURE — 80048 BASIC METABOLIC PNL TOTAL CA: CPT

## 2019-05-12 PROCEDURE — 83735 ASSAY OF MAGNESIUM: CPT

## 2019-05-12 PROCEDURE — 85025 COMPLETE CBC W/AUTO DIFF WBC: CPT

## 2019-05-12 PROCEDURE — 6370000000 HC RX 637 (ALT 250 FOR IP): Performed by: HOSPITALIST

## 2019-05-12 PROCEDURE — 1200000000 HC SEMI PRIVATE

## 2019-05-12 RX ORDER — TORSEMIDE 20 MG/1
20 TABLET ORAL DAILY
Status: DISCONTINUED | OUTPATIENT
Start: 2019-05-13 | End: 2019-05-15 | Stop reason: HOSPADM

## 2019-05-12 RX ORDER — POTASSIUM CHLORIDE 20 MEQ/1
20 TABLET, EXTENDED RELEASE ORAL DAILY
Status: DISCONTINUED | OUTPATIENT
Start: 2019-05-13 | End: 2019-05-15 | Stop reason: HOSPADM

## 2019-05-12 RX ORDER — MAGNESIUM SULFATE IN WATER 40 MG/ML
2 INJECTION, SOLUTION INTRAVENOUS ONCE
Status: COMPLETED | OUTPATIENT
Start: 2019-05-12 | End: 2019-05-12

## 2019-05-12 RX ORDER — BENZONATATE 100 MG/1
100 CAPSULE ORAL 3 TIMES DAILY
Status: DISCONTINUED | OUTPATIENT
Start: 2019-05-12 | End: 2019-05-15 | Stop reason: HOSPADM

## 2019-05-12 RX ORDER — LISINOPRIL 10 MG/1
10 TABLET ORAL DAILY
Status: DISCONTINUED | OUTPATIENT
Start: 2019-05-12 | End: 2019-05-15 | Stop reason: HOSPADM

## 2019-05-12 RX ADMIN — SODIUM CHLORIDE, PRESERVATIVE FREE 10 ML: 5 INJECTION INTRAVENOUS at 21:49

## 2019-05-12 RX ADMIN — MAGNESIUM SULFATE HEPTAHYDRATE 2 G: 40 INJECTION, SOLUTION INTRAVENOUS at 13:46

## 2019-05-12 RX ADMIN — BENZONATATE 100 MG: 100 CAPSULE ORAL at 08:57

## 2019-05-12 RX ADMIN — IPRATROPIUM BROMIDE AND ALBUTEROL SULFATE 1 AMPULE: .5; 3 SOLUTION RESPIRATORY (INHALATION) at 22:20

## 2019-05-12 RX ADMIN — GUAIFENESIN 600 MG: 600 TABLET, EXTENDED RELEASE ORAL at 21:48

## 2019-05-12 RX ADMIN — Medication: at 08:57

## 2019-05-12 RX ADMIN — FLUTICASONE PROPIONATE 1 SPRAY: 50 SPRAY, METERED NASAL at 08:59

## 2019-05-12 RX ADMIN — ROPINIROLE HYDROCHLORIDE 0.5 MG: 0.5 TABLET, FILM COATED ORAL at 08:57

## 2019-05-12 RX ADMIN — BENZONATATE 100 MG: 100 CAPSULE ORAL at 21:49

## 2019-05-12 RX ADMIN — PSYLLIUM HUSK 1 PACKET: 3.4 POWDER ORAL at 08:58

## 2019-05-12 RX ADMIN — BENZONATATE 100 MG: 100 CAPSULE ORAL at 13:46

## 2019-05-12 RX ADMIN — LISINOPRIL 10 MG: 10 TABLET ORAL at 14:17

## 2019-05-12 RX ADMIN — CEFTRIAXONE SODIUM 1 G: 1 INJECTION, POWDER, FOR SOLUTION INTRAMUSCULAR; INTRAVENOUS at 16:50

## 2019-05-12 RX ADMIN — SUCRALFATE 1 G: 1 TABLET ORAL at 16:50

## 2019-05-12 RX ADMIN — PANTOPRAZOLE SODIUM 40 MG: 40 TABLET, DELAYED RELEASE ORAL at 09:04

## 2019-05-12 RX ADMIN — ANTACID TABLETS 500 MG: 500 TABLET, CHEWABLE ORAL at 21:48

## 2019-05-12 RX ADMIN — SODIUM CHLORIDE, PRESERVATIVE FREE 10 ML: 5 INJECTION INTRAVENOUS at 08:58

## 2019-05-12 RX ADMIN — SUCRALFATE 1 G: 1 TABLET ORAL at 08:57

## 2019-05-12 RX ADMIN — ANTACID TABLETS 500 MG: 500 TABLET, CHEWABLE ORAL at 08:57

## 2019-05-12 RX ADMIN — SUCRALFATE 1 G: 1 TABLET ORAL at 21:48

## 2019-05-12 RX ADMIN — RIVAROXABAN 20 MG: 20 TABLET, FILM COATED ORAL at 17:48

## 2019-05-12 RX ADMIN — DILTIAZEM HYDROCHLORIDE 120 MG: 120 CAPSULE, COATED, EXTENDED RELEASE ORAL at 08:57

## 2019-05-12 RX ADMIN — GUAIFENESIN 600 MG: 600 TABLET, EXTENDED RELEASE ORAL at 08:57

## 2019-05-12 RX ADMIN — ASPIRIN 81 MG: 81 TABLET, COATED ORAL at 08:57

## 2019-05-12 RX ADMIN — SUCRALFATE 1 G: 1 TABLET ORAL at 13:46

## 2019-05-12 ASSESSMENT — PAIN DESCRIPTION - LOCATION: LOCATION: BACK;THROAT

## 2019-05-12 ASSESSMENT — PAIN SCALES - GENERAL: PAINLEVEL_OUTOF10: 2

## 2019-05-12 NOTE — PROGRESS NOTES
Physical Therapy  Facility/Department: [place] [address] - MED SURG/ORTHO  Daily Treatment Note  NAME: Angella Victoria  : 1945  MRN: [id number]    Date of Service: 2019    Discharge Recommendations:  24 hour supervision or assist, Home with Home health PT   PT Equipment Recommendations  Equipment Needed: No    Patient Diagnosis(es): The primary encounter diagnosis was Pneumonia due to organism. Diagnoses of Acute kidney injury ([facility] and Dehydration were also pertinent to this visit. has a past medical history of Allergic, Anemia, Atrial fibrillation ([facility] [facility] Breast cancer [facility]), Cardiac pacemaker in situ, Cellulitis, Diffuse pneumonia, Hypertension, Murmur, Osteoarthritis, and Sinus drainage. has a past surgical history that includes Mastectomy (Left); Total knee arthroplasty; and hernia repair. Restrictions  Restrictions/Precautions  Restrictions/Precautions: Fall Risk, Swallowing - Thickened Liquids, Modified Diet  Position Activity Restriction  Other position/activity restrictions: up with assist  Subjective   General  Chart Reviewed: Yes  Family / Caregiver Present: No  Referring Practitioner: Audie Kinsey MD  Subjective  Subjective: Pt supine in bed upon PT arrival reporting, \"I'm sorry, I'm too tired. I was up all day. \" Agreeable to ther ex in bed with mod encouragement. General Comment  Comments: Pt supine in bed upon arrival. RN cleared pt for therapy          Orientation  Orientation  Overall Orientation Status: Within Functional Limits  Cognition      Objective   Exercises  Straight Leg Raise: 3x10 B  Heelslides: x20 B  Ankle Pumps: x25 B      Assessment   Body structures, Functions, Activity limitations: Decreased functional mobility ; Decreased balance  Assessment: Pt deferred amb this PM secondary to fatigue after amb to/from bathroom with RN. Pt arianne all ther ex well. Recommend home with 24 hr SUPVN and HHPT at d/c.    Treatment Diagnosis: impaired gait and transfers  Prognosis:

## 2019-05-12 NOTE — PROGRESS NOTES
Hospitalist Progress Note      PCP: Kyle Vaca MD    Date of Admission: 5/9/2019    Subjective: Persistent coughing. No rashes noted overnight. Medications:  Reviewed    Infusion Medications   Scheduled Medications    benzonatate  100 mg Oral TID    LIP BALM   Topical Daily    guaiFENesin  600 mg Oral BID    cefTRIAXone (ROCEPHIN) IV  1 g Intravenous Q24H    aspirin  81 mg Oral Daily    calcium carbonate  1 tablet Oral BID    diltiazem  120 mg Oral Daily    pantoprazole  40 mg Oral QAM AC    fluticasone  1 spray Nasal Daily    psyllium  1 packet Oral Daily    rivaroxaban  20 mg Oral Daily    rOPINIRole  0.5 mg Oral Daily    sucralfate  1 g Oral 4x Daily    sodium chloride flush  10 mL Intravenous 2 times per day     PRN Meds: perflutren lipid microspheres, diphenhydrAMINE, sodium chloride flush, magnesium hydroxide, ondansetron, ipratropium-albuterol      Intake/Output Summary (Last 24 hours) at 5/12/2019 1313  Last data filed at 5/11/2019 1535  Gross per 24 hour   Intake 120 ml   Output --   Net 120 ml       Physical Exam Performed:    /61   Pulse 60   Temp 98.2 °F (36.8 °C) (Oral)   Resp 16   Ht 5' 5\" (1.651 m)   Wt 177 lb 4 oz (80.4 kg)   SpO2 98%   BMI 29.50 kg/m²     General appearance: No apparent distress, appears stated age and cooperative. HEENT: Pupils equal, round, and reactive to light. Conjunctivae/corneas clear. Neck: Supple, with full range of motion. No jugular venous distention. Trachea midline. Respiratory:  Normal respiratory effort. Clear to auscultation, bilaterally without Rales/Wheezes/Rhonchi. Cardiovascular: Regular rate and rhythm with normal S1/S2 without murmurs, rubs or gallops. Abdomen: Soft, non-tender, non-distended with normal bowel sounds. Musculoskeletal: No clubbing, cyanosis or edema bilaterally. Full range of motion without deformity. Skin: Skin color, texture, turgor normal.  No rashes or lesions.   Neurologic:  Neurovascularly intact without any focal sensory/motor deficits. =  Psychiatric: Alert and oriented, thought content appropriate, normal insight  Capillary Refill: Brisk,< 3 seconds   Peripheral Pulses: +2 palpable, equal bilaterally       Labs:   Recent Labs     05/10/19  0648 05/12/19  0726   WBC 9.4 8.2   HGB 11.0* 11.3*   HCT 32.5* 34.1*    326     Recent Labs     05/10/19  0648 05/12/19  0726    140   K 4.1 4.1    107   CO2 23 25   BUN 11 6*   CREATININE 0.7 <0.5*   CALCIUM 9.1 9.6     No results for input(s): AST, ALT, BILIDIR, BILITOT, ALKPHOS in the last 72 hours. No results for input(s): INR in the last 72 hours. No results for input(s): Madalynn Edgardo in the last 72 hours. Urinalysis:      Lab Results   Component Value Date    NITRU Negative 05/28/2016    WBCUA 20-50 05/10/2016    BACTERIA 2+ 05/10/2016    RBCUA 0-2 05/10/2016    BLOODU Negative 05/28/2016    SPECGRAV <=1.005 05/28/2016    GLUCOSEU Negative 05/28/2016    GLUCOSEU NEGATIVE 04/24/2012       Radiology:  FL MODIFIED BARIUM SWALLOW W VIDEO   Preliminary Result   No evidence of penetration of the larynx or aspiration as described above. Please see separate speech pathology report for full discussion of findings   and recommendations. CT CHEST WO CONTRAST   Final Result   1. Multiple scattered nonspecific reticulonodular and tree-in-bud opacities   throughout the bilateral lung bases, most likely representing either   recurrent aspiration or multifocal pneumonia. However, given that these are   new from prior examinations, recommend appropriate clinical treatment, and   short-term chest CT follow-up in 6-8 weeks to ensure resolution of these   opacities. 2. Stable small sliding-type hiatal hernia. XR CHEST STANDARD (2 VW)   Final Result   No acute cardiopulmonary disease           Assessment  1. Bilateral PNA, suspect viral process vs atypical PNA. PCT low  2. PATITO  3. HTN, benign  4. Atrial fibrillation  5.

## 2019-05-13 ENCOUNTER — TELEPHONE (OUTPATIENT)
Dept: PULMONOLOGY | Age: 74
End: 2019-05-13

## 2019-05-13 LAB
BLOOD CULTURE, ROUTINE: ABNORMAL
LV EF: 55 %
LVEF MODALITY: NORMAL
ORGANISM: ABNORMAL
ORGANISM: ABNORMAL

## 2019-05-13 PROCEDURE — 1200000000 HC SEMI PRIVATE

## 2019-05-13 PROCEDURE — 93306 TTE W/DOPPLER COMPLETE: CPT

## 2019-05-13 PROCEDURE — 6370000000 HC RX 637 (ALT 250 FOR IP): Performed by: HOSPITALIST

## 2019-05-13 PROCEDURE — 97116 GAIT TRAINING THERAPY: CPT

## 2019-05-13 PROCEDURE — 97530 THERAPEUTIC ACTIVITIES: CPT

## 2019-05-13 PROCEDURE — 97110 THERAPEUTIC EXERCISES: CPT

## 2019-05-13 PROCEDURE — 2580000003 HC RX 258: Performed by: HOSPITALIST

## 2019-05-13 PROCEDURE — 6360000002 HC RX W HCPCS: Performed by: HOSPITALIST

## 2019-05-13 PROCEDURE — 6370000000 HC RX 637 (ALT 250 FOR IP): Performed by: INTERNAL MEDICINE

## 2019-05-13 PROCEDURE — 99223 1ST HOSP IP/OBS HIGH 75: CPT | Performed by: INTERNAL MEDICINE

## 2019-05-13 PROCEDURE — 94640 AIRWAY INHALATION TREATMENT: CPT

## 2019-05-13 RX ORDER — ALBUTEROL SULFATE 90 UG/1
2 AEROSOL, METERED RESPIRATORY (INHALATION) 4 TIMES DAILY
Status: DISCONTINUED | OUTPATIENT
Start: 2019-05-13 | End: 2019-05-15 | Stop reason: HOSPADM

## 2019-05-13 RX ADMIN — ROPINIROLE HYDROCHLORIDE 0.5 MG: 0.5 TABLET, FILM COATED ORAL at 07:52

## 2019-05-13 RX ADMIN — Medication 2 PUFF: at 19:51

## 2019-05-13 RX ADMIN — SUCRALFATE 1 G: 1 TABLET ORAL at 07:51

## 2019-05-13 RX ADMIN — Medication 2 PUFF: at 15:31

## 2019-05-13 RX ADMIN — SUCRALFATE 1 G: 1 TABLET ORAL at 14:59

## 2019-05-13 RX ADMIN — DILTIAZEM HYDROCHLORIDE 120 MG: 120 CAPSULE, COATED, EXTENDED RELEASE ORAL at 07:51

## 2019-05-13 RX ADMIN — BENZONATATE 100 MG: 100 CAPSULE ORAL at 14:59

## 2019-05-13 RX ADMIN — BENZONATATE 100 MG: 100 CAPSULE ORAL at 07:52

## 2019-05-13 RX ADMIN — GUAIFENESIN 600 MG: 600 TABLET, EXTENDED RELEASE ORAL at 07:52

## 2019-05-13 RX ADMIN — SUCRALFATE 1 G: 1 TABLET ORAL at 17:43

## 2019-05-13 RX ADMIN — SODIUM CHLORIDE, PRESERVATIVE FREE 10 ML: 5 INJECTION INTRAVENOUS at 07:52

## 2019-05-13 RX ADMIN — RIVAROXABAN 20 MG: 20 TABLET, FILM COATED ORAL at 17:43

## 2019-05-13 RX ADMIN — BENZONATATE 100 MG: 100 CAPSULE ORAL at 20:27

## 2019-05-13 RX ADMIN — ANTACID TABLETS 500 MG: 500 TABLET, CHEWABLE ORAL at 07:52

## 2019-05-13 RX ADMIN — PANTOPRAZOLE SODIUM 40 MG: 40 TABLET, DELAYED RELEASE ORAL at 07:03

## 2019-05-13 RX ADMIN — SUCRALFATE 1 G: 1 TABLET ORAL at 20:30

## 2019-05-13 RX ADMIN — TORSEMIDE 20 MG: 20 TABLET ORAL at 07:52

## 2019-05-13 RX ADMIN — ASPIRIN 81 MG: 81 TABLET, COATED ORAL at 07:52

## 2019-05-13 RX ADMIN — LISINOPRIL 10 MG: 10 TABLET ORAL at 07:52

## 2019-05-13 RX ADMIN — SODIUM CHLORIDE, PRESERVATIVE FREE 10 ML: 5 INJECTION INTRAVENOUS at 20:28

## 2019-05-13 RX ADMIN — CEFTRIAXONE SODIUM 1 G: 1 INJECTION, POWDER, FOR SOLUTION INTRAMUSCULAR; INTRAVENOUS at 17:43

## 2019-05-13 RX ADMIN — POTASSIUM CHLORIDE 20 MEQ: 20 TABLET, EXTENDED RELEASE ORAL at 07:52

## 2019-05-13 RX ADMIN — FLUTICASONE PROPIONATE 1 SPRAY: 50 SPRAY, METERED NASAL at 07:53

## 2019-05-13 RX ADMIN — ANTACID TABLETS 500 MG: 500 TABLET, CHEWABLE ORAL at 20:28

## 2019-05-13 RX ADMIN — PSYLLIUM HUSK 1 PACKET: 3.4 POWDER ORAL at 07:52

## 2019-05-13 RX ADMIN — GUAIFENESIN 600 MG: 600 TABLET, EXTENDED RELEASE ORAL at 20:27

## 2019-05-13 ASSESSMENT — ENCOUNTER SYMPTOMS
EYE PAIN: 0
COUGH: 1
CHOKING: 0
VOICE CHANGE: 0
EYE DISCHARGE: 0
DIARRHEA: 0
CONSTIPATION: 0
ABDOMINAL PAIN: 0
EYE ITCHING: 0
CHEST TIGHTNESS: 0
STRIDOR: 0
SORE THROAT: 0

## 2019-05-13 ASSESSMENT — PAIN SCALES - GENERAL: PAINLEVEL_OUTOF10: 0

## 2019-05-13 NOTE — CONSULTS
Pulmonary & Critical Care Consultation Note   Sherrie Sánchez MD    REASONFOR CONSULTATION/CC: pneumonia    Consult at request of  Patricia Chandler MD  PCP: Gilbert Mcgarry MD    HISTORYOF PRESENT ILLNESS:    66y.o. year old female initially presented to the ED with a one week history of a persistent daily cough, associated congestion and yellow sputum production, and shortness of breath with mild exertion. Workup identified a multifocal pneumonia, she was admitted and placed on treatment for this with CAP coverage. Her blood cultures identified strep species and further workup of her bacteremia including echo is still pending. She has been weaned off oxygen  When seen today she does inform me she if felling better. Speech therapy had recommended a dysphagia 2 diet which the patient has been reluctant to comply with      Past Medical History:   Diagnosis Date    Allergic     Anemia     Atrial fibrillation ([address]     Breast cancer ([address]     s/p left mastectomy     Cardiac pacemaker in situ 10/1/2015 left    AV node ablation and Bi-V PPM ([employer])    Cellulitis     right leg.  Diffuse pneumonia 2019    Hypertension     Murmur     Osteoarthritis     Sinus drainage        Past Surgical History:   Procedure Laterality Date    HERNIA REPAIR      MASTECTOMY Left     TOTAL KNEE ARTHROPLASTY      bilateral.       family history includes Arthritis in her paternal grandfather; Heart Disease in her father and mother; High Blood Pressure in her father and mother.     Social History     Tobacco Use    Smoking status: Former Smoker     Types: Cigarettes     Last attempt to quit: 1994     Years since quittin.0    Smokeless tobacco: Never Used    Tobacco comment: 5 cigarettes per day for 3-4 years   Substance Use Topics    Alcohol use: No     Alcohol/week: 0.0 oz        Scheduled Meds:   benzonatate  100 mg Oral TID    lisinopril  10 mg Oral Daily    torsemide  20 mg Oral Daily    potassium chloride  20 mEq Oral Daily    LIP BALM   Topical Daily    guaiFENesin  600 mg Oral BID    cefTRIAXone (ROCEPHIN) IV  1 g Intravenous Q24H    aspirin  81 mg Oral Daily    calcium carbonate  1 tablet Oral BID    diltiazem  120 mg Oral Daily    pantoprazole  40 mg Oral QAM AC    fluticasone  1 spray Nasal Daily    psyllium  1 packet Oral Daily    rivaroxaban  20 mg Oral Daily    rOPINIRole  0.5 mg Oral Daily    sucralfate  1 g Oral 4x Daily    sodium chloride flush  10 mL Intravenous 2 times per day       Continuous Infusions:      PRN Meds:   perflutren lipid microspheres, diphenhydrAMINE, sodium chloride flush, magnesium hydroxide, ondansetron, ipratropium-albuterol   Inpatient consult to Pulmonology  Consult performed by: Salazar Mendoza MD  Consult ordered by: Derrick Drake MD        ALLERGIES:  Patient is allergic to amoxicillin-pot clavulanate; betadine [povidone iodine]; cipro xr; morphine; phenergan [promethazine hcl]; promethazine; sulfamethoxazole-trimethoprim; sulfamethoxazole-trimethoprim; tape [adhesive tape]; cephalexin; codeine; and prochlorperazine edisylate. REVIEW OF SYSTEMS:  Review of Systems   Constitutional: Negative for chills, fever and unexpected weight change. HENT: Negative for mouth sores, sore throat and voice change. Eyes: Negative for pain, discharge and itching. Respiratory: Positive for cough. Negative for choking, chest tightness and stridor. Cardiovascular: Negative for chest pain, palpitations and leg swelling. Gastrointestinal: Negative for abdominal pain, constipation and diarrhea. Endocrine: Negative for cold intolerance, heat intolerance and polydipsia. Genitourinary: Negative for dysuria, frequency and hematuria. Musculoskeletal: Negative for gait problem, joint swelling and neck stiffness. Neurological: Negative for dizziness, numbness and headaches. Psychiatric/Behavioral: Negative for agitation, confusion and hallucinations. Objective:   PHYSICAL EXAM:  /61   Pulse 58   Temp 98.3 °F (36.8 °C) (Oral)   Resp 16   Ht 5' 5\" (1.651 m)   Wt 177 lb 4 oz (80.4 kg)   SpO2 97%   BMI 29.50 kg/m²    Physical Exam   Constitutional: She appears well-developed and well-nourished. No distress. HENT:   Head: Normocephalic and atraumatic. Mouth/Throat: Oropharynx is clear and moist. No oropharyngeal exudate. Eyes: Pupils are equal, round, and reactive to light. EOM are normal.   Neck: Neck supple. No JVD present. Cardiovascular: Normal heart sounds. Exam reveals no gallop and no friction rub. No murmur heard. Pulmonary/Chest: Effort normal. She has no wheezes. She has no rales. Equal chest rise and expansion bilaterally   Abdominal: Soft. Bowel sounds are normal. She exhibits no distension. There is no tenderness. Musculoskeletal: Normal range of motion. She exhibits no edema. Lymphadenopathy:     She has no cervical adenopathy. Neurological: She is alert. No cranial nerve deficit. CN 2-12 grossly intact   Skin: Skin is warm and dry. No rash noted. She is not diaphoretic. Data Reviewed:   LABS:  CBC:   Recent Labs     05/12/19  0726   WBC 8.2   HGB 11.3*   HCT 34.1*   MCV 82.6        BMP:   Recent Labs     05/12/19  0726      K 4.1      CO2 25   BUN 6*   CREATININE <0.5*     LIVER PROFILE: No results for input(s): AST, ALT, LIPASE, AMYLASE, BILIDIR, BILITOT, ALKPHOS in the last 72 hours. Invalid input(s):  ALB  PT/INR: No results for input(s): PROTIME, INR in the last 72 hours. APTT:No results for input(s): APTT in the last 72 hours. UA:No results for input(s): NITRITE, COLORU, PHUR, LABCAST, WBCUA, RBCUA, MUCUS, TRICHOMONAS, YEAST, BACTERIA, CLARITYU, SPECGRAV, LEUKOCYTESUR, UROBILINOGEN, BILIRUBINUR, BLOODU, GLUCOSEU, AMORPHOUS in the last 72 hours. Invalid input(s): KETONESU  No results for input(s): PHART, DQA0XZP, PO2ART in the last 72 hours.          CT chest personally reviewed, peribronchial thickening and tree in bud opacities suggestive of a impacted mucous          Assessment:     1. Acute multifocal pneumonia, strep species   2. Dysphagia on speech eval   3. Strep bacteremia  4. PATITO improved    Plan:      -Continue with strep coverage, given the presence of bacteremia would recommend a 2 week course  -Weaned off oxygen, continue bronchodilators to assist with pulm toileting as she has been experiencing ongoing congestion. I placed her on Combivent MDI  -Will need further imaging in 4-6 weeks to ensure clearance of infiltrates, if not clearing may need further workup to rule out LAM but nothing that needs to be investigated inpatient. Please contact with questions, will arrange for outpatient pulm follow up after discharge.      Adali Moore MD

## 2019-05-13 NOTE — PROGRESS NOTES
Hospitalist Progress Note      PCP: Octavio Molina MD    Date of Admission: 5/9/2019    Chief Complaint: Cough    Hospital Course:      Subjective:   Patient is up in bed, comfortable, not in distress. Denies any chest pain or shortness of breath. No new event overnight noted. Medications:  Reviewed    Infusion Medications   Scheduled Medications    benzonatate  100 mg Oral TID    lisinopril  10 mg Oral Daily    torsemide  20 mg Oral Daily    potassium chloride  20 mEq Oral Daily    LIP BALM   Topical Daily    guaiFENesin  600 mg Oral BID    cefTRIAXone (ROCEPHIN) IV  1 g Intravenous Q24H    aspirin  81 mg Oral Daily    calcium carbonate  1 tablet Oral BID    diltiazem  120 mg Oral Daily    pantoprazole  40 mg Oral QAM AC    fluticasone  1 spray Nasal Daily    psyllium  1 packet Oral Daily    rivaroxaban  20 mg Oral Daily    rOPINIRole  0.5 mg Oral Daily    sucralfate  1 g Oral 4x Daily    sodium chloride flush  10 mL Intravenous 2 times per day     PRN Meds: perflutren lipid microspheres, diphenhydrAMINE, sodium chloride flush, magnesium hydroxide, ondansetron, ipratropium-albuterol      Intake/Output Summary (Last 24 hours) at 5/13/2019 1256  Last data filed at 5/13/2019 0940  Gross per 24 hour   Intake 480 ml   Output --   Net 480 ml       Physical Exam Performed:    /61   Pulse 58   Temp 98.3 °F (36.8 °C) (Oral)   Resp 16   Ht 5' 5\" (1.651 m)   Wt 177 lb 4 oz (80.4 kg)   SpO2 97%   BMI 29.50 kg/m²     General appearance: No apparent distress, appears stated age and cooperative. HEENT: Pupils equal, round, and reactive to light. Conjunctivae/corneas clear. Neck: Supple, with full range of motion. No jugular venous distention. Trachea midline. Respiratory:  Normal respiratory effort. Clear to auscultation, bilaterally without Rales/Wheezes/Rhonchi. Cardiovascular: Regular rate and rhythm with normal S1/S2 without murmurs, rubs or gallops.   Abdomen: Soft, non-tender, non-distended with normal bowel sounds. Musculoskeletal: No clubbing, cyanosis or edema bilaterally. Full range of motion without deformity. Skin: Skin color, texture, turgor normal.  No rashes or lesions. Neurologic:  Neurovascularly intact without any focal sensory/motor deficits. Cranial nerves: II-XII intact, grossly non-focal.  Psychiatric: Alert and oriented, thought content appropriate, normal insight  Capillary Refill: Brisk,< 3 seconds   Peripheral Pulses: +2 palpable, equal bilaterally       Labs:   Recent Labs     05/12/19  0726   WBC 8.2   HGB 11.3*   HCT 34.1*        Recent Labs     05/12/19  0726      K 4.1      CO2 25   BUN 6*   CREATININE <0.5*   CALCIUM 9.6       Urinalysis:      Lab Results   Component Value Date    NITRU Negative 05/28/2016    WBCUA 20-50 05/10/2016    BACTERIA 2+ 05/10/2016    RBCUA 0-2 05/10/2016    BLOODU Negative 05/28/2016    SPECGRAV <=1.005 05/28/2016    GLUCOSEU Negative 05/28/2016    GLUCOSEU NEGATIVE 04/24/2012       Radiology:  FL MODIFIED BARIUM SWALLOW W VIDEO   Final Result   No evidence of penetration of the larynx or aspiration as described above. Please see separate speech pathology report for full discussion of findings   and recommendations. CT CHEST WO CONTRAST   Final Result   1. Multiple scattered nonspecific reticulonodular and tree-in-bud opacities   throughout the bilateral lung bases, most likely representing either   recurrent aspiration or multifocal pneumonia. However, given that these are   new from prior examinations, recommend appropriate clinical treatment, and   short-term chest CT follow-up in 6-8 weeks to ensure resolution of these   opacities. 2. Stable small sliding-type hiatal hernia.          XR CHEST STANDARD (2 VW)   Final Result   No acute cardiopulmonary disease                 Assessment/Plan:    Active Hospital Problems    Diagnosis Date Noted    Diffuse pneumonia [J18.0] 05/09/2019     Bilateral PNA, suspect viral process vs atypical PNA. Await further blood cx data, 1/2 + Strep, likely lung source  Await repeat blood cx data to ensure clearance   Check TTE, result is pending   Cont IV Rocephin  monitoring for allergic rxn given listed allergies   Cont Mucinex, nebs  Pulmonary consult. PATITO, likely prerenal, improving with IV hydration, BUN and creatinine at 6 and 0.5. HTN, benign  BP rising and renal function improved.   Resume home Lisinopril, KCL, Torsemide    Atrial fibrillation  Cont home Dilt, Xarelto      DVT Prophylaxis: Lovenox  Diet: DIET GENERAL;  Code Status: Full Code    PT/OT Eval Status: Ambulatory    Dispo - in 2-4 days    Bandar Chamberlain MD

## 2019-05-13 NOTE — PROGRESS NOTES
Physical Therapy  Facility/Department: [place] [address] - MED SURG/ORTHO  Daily Treatment Note  NAME: Toshia Bradshaw  : 1945  MRN: [id number]    Date of Service: 2019    Discharge Recommendations:  24 hour supervision or assist, Home with Home health PT   PT Equipment Recommendations  Equipment Needed: No    Patient Diagnosis(es): The primary encounter diagnosis was Pneumonia due to organism. Diagnoses of Acute kidney injury ([facility] [address] and Dehydration were also pertinent to this visit. has a past medical history of Allergic, Anemia, Atrial fibrillation ([facility] [address] Breast cancer [facility]), Cardiac pacemaker in situ, Cellulitis, Diffuse pneumonia, Hypertension, Murmur, Osteoarthritis, and Sinus drainage. has a past surgical history that includes Mastectomy (Left); Total knee arthroplasty; and hernia repair.     Restrictions  Restrictions/Precautions  Restrictions/Precautions: Fall Risk, Swallowing - Thickened Liquids, Modified Diet  Position Activity Restriction  Other position/activity restrictions: up with assist     Subjective   General  Chart Reviewed: Yes  Family / Caregiver Present: No  Referring Practitioner: Emily Barrera MD  Subjective  Subjective: Pt up to chair, needs to use restroom on arrival  Pain Screening  Patient Currently in Pain: Denies  Vital Signs  Patient Currently in Pain: Denies            Objective   Bed mobility  Supine to Sit: Unable to assess(already up)  Sit to Supine: Unable to assess(remained up)  Transfers  Sit to Stand: Contact guard assistance;Minimal Assistance  Stand to sit: Contact guard assistance  Ambulation  Ambulation?: Yes  Ambulation 1  Surface: level tile  Device: Rolling Walker  Assistance: Contact guard assistance  Quality of Gait: decreased foot clearance bilaterally  Distance: 25', 60' X 2        Exercises  Gluteal Sets: X 10 B  Hip Flexion: X 15 BLE seated marches  Hip Abduction: X 10 BLE seated clamshells  Knee Long Arc Quad: X 10 BLE  Ankle Pumps: X 20 BLE Assessment   Body structures, Functions, Activity limitations: Decreased functional mobility ; Decreased balance  Assessment: Pt amb 15', 60'x 2 with RW and CGA of 1, limted by fatigue, recommending pt D/C back to home iwth 24 hr supervision and HHPT at D/C  Treatment Diagnosis: impaired gait and transfers  Prognosis: Good  Patient Education: Educated on role of PT, use of call light, safety with mobility, ther ex; pt verbalizes understanding  REQUIRES PT FOLLOW UP: Yes  Activity Tolerance  Activity Tolerance: Patient Tolerated treatment well       AM-PAC Score  AM-PAC Inpatient Mobility Raw Score : 20  AM-PAC Inpatient T-Scale Score : 47.67  Mobility Inpatient CMS 0-100% Score: 35.83  Mobility Inpatient CMS G-Code Modifier : CJ          Goals  Short term goals  Time Frame for Short term goals: 5 days  Short term goal 1: Pt will perform bed mobility mod I  Short term goal 2: Pt will perform transfers mod I  Short term goal 3: Pt will ambulate 48' with RW and supervision  Short term goal 4: By 5/12/19, pt will tolerate 15 reps of LE ther ex for improved strength and activity tolerance-MET, ongoing  Patient Goals   Patient goals : \"to go back home\"    Plan    Plan  Times per week: 3-5x/wk  Current Treatment Recommendations: Strengthening, Balance Training, Functional Mobility Training, Transfer Training, Gait Training, Patient/Caregiver Education & Training  Safety Devices  Type of devices:  All fall risk precautions in place, Call light within reach, Chair alarm in place, Gait belt, Left in chair, Nurse notified  Restraints  Initially in place: No     Therapy Time   Individual Concurrent Group Co-treatment   Time In 0915         Time Out 1000         Minutes [address] [facility], [address] [place] [address]

## 2019-05-14 LAB — CULTURE, BLOOD 2: NORMAL

## 2019-05-14 PROCEDURE — 2580000003 HC RX 258: Performed by: INTERNAL MEDICINE

## 2019-05-14 PROCEDURE — 94640 AIRWAY INHALATION TREATMENT: CPT

## 2019-05-14 PROCEDURE — 1200000000 HC SEMI PRIVATE

## 2019-05-14 PROCEDURE — 2580000003 HC RX 258: Performed by: HOSPITALIST

## 2019-05-14 PROCEDURE — 6370000000 HC RX 637 (ALT 250 FOR IP): Performed by: HOSPITALIST

## 2019-05-14 PROCEDURE — 6360000002 HC RX W HCPCS: Performed by: INTERNAL MEDICINE

## 2019-05-14 RX ADMIN — SODIUM CHLORIDE, PRESERVATIVE FREE 10 ML: 5 INJECTION INTRAVENOUS at 21:36

## 2019-05-14 RX ADMIN — Medication 2 PUFF: at 15:27

## 2019-05-14 RX ADMIN — ROPINIROLE HYDROCHLORIDE 0.5 MG: 0.5 TABLET, FILM COATED ORAL at 09:23

## 2019-05-14 RX ADMIN — POTASSIUM CHLORIDE 20 MEQ: 20 TABLET, EXTENDED RELEASE ORAL at 09:23

## 2019-05-14 RX ADMIN — BENZONATATE 100 MG: 100 CAPSULE ORAL at 21:35

## 2019-05-14 RX ADMIN — CEFTRIAXONE SODIUM 2 G: 2 INJECTION, POWDER, FOR SOLUTION INTRAMUSCULAR; INTRAVENOUS at 18:21

## 2019-05-14 RX ADMIN — ANTACID TABLETS 500 MG: 500 TABLET, CHEWABLE ORAL at 21:35

## 2019-05-14 RX ADMIN — Medication 2 PUFF: at 12:39

## 2019-05-14 RX ADMIN — RIVAROXABAN 20 MG: 20 TABLET, FILM COATED ORAL at 18:21

## 2019-05-14 RX ADMIN — PANTOPRAZOLE SODIUM 40 MG: 40 TABLET, DELAYED RELEASE ORAL at 05:47

## 2019-05-14 RX ADMIN — DILTIAZEM HYDROCHLORIDE 120 MG: 120 CAPSULE, COATED, EXTENDED RELEASE ORAL at 09:23

## 2019-05-14 RX ADMIN — BENZONATATE 100 MG: 100 CAPSULE ORAL at 14:02

## 2019-05-14 RX ADMIN — Medication 2 PUFF: at 20:05

## 2019-05-14 RX ADMIN — ASPIRIN 81 MG: 81 TABLET, COATED ORAL at 09:23

## 2019-05-14 RX ADMIN — SUCRALFATE 1 G: 1 TABLET ORAL at 21:35

## 2019-05-14 RX ADMIN — GUAIFENESIN 600 MG: 600 TABLET, EXTENDED RELEASE ORAL at 09:23

## 2019-05-14 RX ADMIN — SUCRALFATE 1 G: 1 TABLET ORAL at 09:23

## 2019-05-14 RX ADMIN — Medication 2 PUFF: at 07:59

## 2019-05-14 RX ADMIN — SODIUM CHLORIDE, PRESERVATIVE FREE 10 ML: 5 INJECTION INTRAVENOUS at 09:23

## 2019-05-14 RX ADMIN — SUCRALFATE 1 G: 1 TABLET ORAL at 14:02

## 2019-05-14 RX ADMIN — ANTACID TABLETS 500 MG: 500 TABLET, CHEWABLE ORAL at 09:23

## 2019-05-14 RX ADMIN — BENZONATATE 100 MG: 100 CAPSULE ORAL at 09:23

## 2019-05-14 RX ADMIN — FLUTICASONE PROPIONATE 1 SPRAY: 50 SPRAY, METERED NASAL at 09:24

## 2019-05-14 RX ADMIN — LISINOPRIL 10 MG: 10 TABLET ORAL at 09:23

## 2019-05-14 RX ADMIN — GUAIFENESIN 600 MG: 600 TABLET, EXTENDED RELEASE ORAL at 21:35

## 2019-05-14 RX ADMIN — TORSEMIDE 20 MG: 20 TABLET ORAL at 09:23

## 2019-05-14 RX ADMIN — SUCRALFATE 1 G: 1 TABLET ORAL at 18:21

## 2019-05-14 RX ADMIN — PSYLLIUM HUSK 1 PACKET: 3.4 POWDER ORAL at 09:23

## 2019-05-14 NOTE — PROGRESS NOTES
Hospitalist Progress Note      PCP: Katty Leon MD    Date of Admission: 5/9/2019    Chief Complaint: Cough    Hospital Course:      Subjective:   Patient is up in bed, comfortable, not in distress. Denies any chest pain or shortness of breath. No new event overnight noted. Medications:  Reviewed    Infusion Medications   Scheduled Medications    cefTRIAXone (ROCEPHIN) IV  2 g Intravenous Q24H    albuterol sulfate HFA  2 puff Inhalation 4x daily    And    ipratropium  2 puff Inhalation 4x daily    benzonatate  100 mg Oral TID    lisinopril  10 mg Oral Daily    torsemide  20 mg Oral Daily    potassium chloride  20 mEq Oral Daily    LIP BALM   Topical Daily    guaiFENesin  600 mg Oral BID    aspirin  81 mg Oral Daily    calcium carbonate  1 tablet Oral BID    diltiazem  120 mg Oral Daily    pantoprazole  40 mg Oral QAM AC    fluticasone  1 spray Nasal Daily    psyllium  1 packet Oral Daily    rivaroxaban  20 mg Oral Daily    rOPINIRole  0.5 mg Oral Daily    sucralfate  1 g Oral 4x Daily    sodium chloride flush  10 mL Intravenous 2 times per day     PRN Meds: perflutren lipid microspheres, diphenhydrAMINE, sodium chloride flush, magnesium hydroxide, ondansetron, ipratropium-albuterol      Intake/Output Summary (Last 24 hours) at 5/14/2019 1147  Last data filed at 5/13/2019 1850  Gross per 24 hour   Intake 480 ml   Output --   Net 480 ml       Physical Exam Performed:    BP (!) 118/55   Pulse 69   Temp 97.7 °F (36.5 °C) (Oral)   Resp 16   Ht 5' 5\" (1.651 m)   Wt 177 lb 4 oz (80.4 kg)   SpO2 97%   BMI 29.50 kg/m²     General appearance: No apparent distress, appears stated age and cooperative. HEENT: Pupils equal, round, and reactive to light. Conjunctivae/corneas clear. Neck: Supple, with full range of motion. No jugular venous distention. Trachea midline. Respiratory:  Normal respiratory effort. Clear to auscultation, bilaterally without Rales/Wheezes/Rhonchi.   Cardiovascular: Regular rate and rhythm with normal S1/S2 without murmurs, rubs or gallops. Abdomen: Soft, non-tender, non-distended with normal bowel sounds. Musculoskeletal: No clubbing, cyanosis or edema bilaterally. Full range of motion without deformity. Skin: Skin color, texture, turgor normal.  No rashes or lesions. Neurologic:  Neurovascularly intact without any focal sensory/motor deficits. Cranial nerves: II-XII intact, grossly non-focal.  Psychiatric: Alert and oriented, thought content appropriate, normal insight  Capillary Refill: Brisk,< 3 seconds   Peripheral Pulses: +2 palpable, equal bilaterally       Labs:   Recent Labs     05/12/19  0726   WBC 8.2   HGB 11.3*   HCT 34.1*        Recent Labs     05/12/19  0726      K 4.1      CO2 25   BUN 6*   CREATININE <0.5*   CALCIUM 9.6       Urinalysis:      Lab Results   Component Value Date    NITRU Negative 05/28/2016    WBCUA 20-50 05/10/2016    BACTERIA 2+ 05/10/2016    RBCUA 0-2 05/10/2016    BLOODU Negative 05/28/2016    SPECGRAV <=1.005 05/28/2016    GLUCOSEU Negative 05/28/2016    GLUCOSEU NEGATIVE 04/24/2012       Radiology:  FL MODIFIED BARIUM SWALLOW W VIDEO   Final Result   No evidence of penetration of the larynx or aspiration as described above. Please see separate speech pathology report for full discussion of findings   and recommendations. CT CHEST WO CONTRAST   Final Result   1. Multiple scattered nonspecific reticulonodular and tree-in-bud opacities   throughout the bilateral lung bases, most likely representing either   recurrent aspiration or multifocal pneumonia. However, given that these are   new from prior examinations, recommend appropriate clinical treatment, and   short-term chest CT follow-up in 6-8 weeks to ensure resolution of these   opacities. 2. Stable small sliding-type hiatal hernia.          XR CHEST STANDARD (2 VW)   Final Result   No acute cardiopulmonary disease Assessment/Plan:    Active Hospital Problems    Diagnosis Date Noted    Diffuse pneumonia [J18.0] 05/09/2019     Bilateral PNA, suspect viral process vs atypical PNA. Await further blood cx data, 1/2 + Strep, likely lung source  Await repeat blood cx data to ensure clearance   Check TTE, result is pending   Cont IV Rocephin  monitoring for allergic rxn given listed allergies   Cont Mucinex, nebs  Pulmonary consult. Input appreciated, plan to continue current management. Will increase dose of Rocephin 2 g IV daily, plan to change to by mouth antibiotic in 1-2 days. PATITO, likely prerenal, improving with IV hydration, BUN and creatinine at 6 and 0.5. Clinically resolved. HTN, benign  BP rising and renal function improved.   Resume home Lisinopril, KCL, Torsemide    Atrial fibrillation  Cont home Dilt, Xarelto      DVT Prophylaxis: Lovenox  Diet: DIET GENERAL;  Code Status: Full Code    PT/OT Eval Status: Ambulatory    Dispo - in 1-2 days    Juan Yoo MD

## 2019-05-15 VITALS
OXYGEN SATURATION: 94 % | SYSTOLIC BLOOD PRESSURE: 103 MMHG | WEIGHT: 177.25 LBS | RESPIRATION RATE: 19 BRPM | HEART RATE: 63 BPM | BODY MASS INDEX: 29.53 KG/M2 | TEMPERATURE: 98.4 F | DIASTOLIC BLOOD PRESSURE: 59 MMHG | HEIGHT: 65 IN

## 2019-05-15 PROCEDURE — 6370000000 HC RX 637 (ALT 250 FOR IP): Performed by: HOSPITALIST

## 2019-05-15 PROCEDURE — 94640 AIRWAY INHALATION TREATMENT: CPT

## 2019-05-15 PROCEDURE — 2580000003 HC RX 258: Performed by: HOSPITALIST

## 2019-05-15 PROCEDURE — 94645 CONT INHLJ TX EACH ADDL HOUR: CPT

## 2019-05-15 RX ORDER — CEFUROXIME AXETIL 500 MG/1
500 TABLET ORAL 2 TIMES DAILY
Qty: 20 TABLET | Refills: 0 | Status: SHIPPED | OUTPATIENT
Start: 2019-05-15 | End: 2019-05-25

## 2019-05-15 RX ADMIN — Medication 2 PUFF: at 08:24

## 2019-05-15 RX ADMIN — SUCRALFATE 1 G: 1 TABLET ORAL at 18:17

## 2019-05-15 RX ADMIN — Medication 2 PUFF: at 15:52

## 2019-05-15 RX ADMIN — Medication 2 PUFF: at 11:45

## 2019-05-15 RX ADMIN — PANTOPRAZOLE SODIUM 40 MG: 40 TABLET, DELAYED RELEASE ORAL at 05:49

## 2019-05-15 RX ADMIN — SUCRALFATE 1 G: 1 TABLET ORAL at 13:01

## 2019-05-15 RX ADMIN — SUCRALFATE 1 G: 1 TABLET ORAL at 08:46

## 2019-05-15 RX ADMIN — ASPIRIN 81 MG: 81 TABLET, COATED ORAL at 08:46

## 2019-05-15 RX ADMIN — TORSEMIDE 20 MG: 20 TABLET ORAL at 08:46

## 2019-05-15 RX ADMIN — ROPINIROLE HYDROCHLORIDE 0.5 MG: 0.5 TABLET, FILM COATED ORAL at 08:46

## 2019-05-15 RX ADMIN — ANTACID TABLETS 500 MG: 500 TABLET, CHEWABLE ORAL at 08:46

## 2019-05-15 RX ADMIN — Medication 2 PUFF: at 15:51

## 2019-05-15 RX ADMIN — POTASSIUM CHLORIDE 20 MEQ: 20 TABLET, EXTENDED RELEASE ORAL at 08:46

## 2019-05-15 RX ADMIN — BENZONATATE 100 MG: 100 CAPSULE ORAL at 13:01

## 2019-05-15 RX ADMIN — DILTIAZEM HYDROCHLORIDE 120 MG: 120 CAPSULE, COATED, EXTENDED RELEASE ORAL at 08:47

## 2019-05-15 RX ADMIN — SODIUM CHLORIDE, PRESERVATIVE FREE 10 ML: 5 INJECTION INTRAVENOUS at 08:46

## 2019-05-15 RX ADMIN — LISINOPRIL 10 MG: 10 TABLET ORAL at 08:46

## 2019-05-15 RX ADMIN — BENZONATATE 100 MG: 100 CAPSULE ORAL at 08:46

## 2019-05-15 RX ADMIN — GUAIFENESIN 600 MG: 600 TABLET, EXTENDED RELEASE ORAL at 08:46

## 2019-05-15 RX ADMIN — PSYLLIUM HUSK 1 PACKET: 3.4 POWDER ORAL at 08:46

## 2019-05-15 RX ADMIN — RIVAROXABAN 20 MG: 20 TABLET, FILM COATED ORAL at 18:17

## 2019-05-15 RX ADMIN — FLUTICASONE PROPIONATE 1 SPRAY: 50 SPRAY, METERED NASAL at 08:47

## 2019-05-15 NOTE — DISCHARGE SUMMARY
Hospital Medicine Discharge Summary    Patient ID: Ronan Vaca      Patient's PCP: Noreen Burks MD    Admit Date: 5/9/2019     Discharge Date:   5/15/19    Admitting Physician: Carlita Bryant MD     Discharge Physician: Tanmay Rodriguez MD     Discharge Diagnoses: Active Hospital Problems    Diagnosis Date Noted    Diffuse pneumonia [J18.0] 05/09/2019       The patient was seen and examined on day of discharge and this discharge summary is in conjunction with any daily progress note from day of discharge. Hospital Course:     Bilateral PNA, suspect viral process vs atypical PNA. Await further blood cx data, 1/2 + Strep, likely lung source  Await repeat blood cx data to ensure clearance   Check TTE, result is pending   Cont IV Rocephin  monitoring for allergic rxn given listed allergies   Cont Mucinex, nebs  Pulmonary consult. Input appreciated, plan to continue current management. Will increase dose of Rocephin 2 g IV daily, plan to change to by mouth antibiotic - Ceftin, patient is allergic to Penicillin.      PATITO, likely prerenal, improving with IV hydration, BUN and creatinine at 6 and 0.5. Clinically resolved.     HTN, benign  BP rising and renal function improved.  Resume home Lisinopril, KCL, Torsemide     Atrial fibrillation  Cont home Dilt, Xarelto        Physical Exam Performed:     BP (!) 103/59   Pulse 63   Temp 98.4 °F (36.9 °C) (Axillary)   Resp 16   Ht 5' 5\" (1.651 m)   Wt 177 lb 4 oz (80.4 kg)   SpO2 97%   BMI 29.50 kg/m²       General appearance:  No apparent distress, appears stated age and cooperative. HEENT:  Normal cephalic, atraumatic without obvious deformity. Pupils equal, round, and reactive to light. Extra ocular muscles intact. Conjunctivae/corneas clear. Neck: Supple, with full range of motion. No jugular venous distention. Trachea midline. Respiratory:  Normal respiratory effort.  Clear to auscultation, bilaterally without Rales/Wheezes/Rhonchi. Cardiovascular:  Regular rate and rhythm with normal S1/S2 without murmurs, rubs or gallops. Abdomen: Soft, non-tender, non-distended with normal bowel sounds. Musculoskeletal:  No clubbing, cyanosis or edema bilaterally. Full range of motion without deformity. Skin: Skin color, texture, turgor normal.  No rashes or lesions. Neurologic:  Neurovascularly intact without any focal sensory/motor deficits. Cranial nerves: II-XII intact, grossly non-focal.  Psychiatric:  Alert and oriented, thought content appropriate, normal insight  Capillary Refill: Brisk,< 3 seconds   Peripheral Pulses: +2 palpable, equal bilaterally       Labs: For convenience and continuity at follow-up the following most recent labs are provided:      CBC:    Lab Results   Component Value Date    WBC 8.2 05/12/2019    HGB 11.3 05/12/2019    HCT 34.1 05/12/2019     05/12/2019       Renal:    Lab Results   Component Value Date     05/12/2019    K 4.1 05/12/2019    K 4.1 05/10/2019     05/12/2019    CO2 25 05/12/2019    BUN 6 05/12/2019    CREATININE <0.5 05/12/2019    CALCIUM 9.6 05/12/2019    PHOS 2.6 06/02/2016         Significant Diagnostic Studies    Radiology:   FL MODIFIED BARIUM SWALLOW W VIDEO   Final Result   No evidence of penetration of the larynx or aspiration as described above. Please see separate speech pathology report for full discussion of findings   and recommendations. CT CHEST WO CONTRAST   Final Result   1. Multiple scattered nonspecific reticulonodular and tree-in-bud opacities   throughout the bilateral lung bases, most likely representing either   recurrent aspiration or multifocal pneumonia. However, given that these are   new from prior examinations, recommend appropriate clinical treatment, and   short-term chest CT follow-up in 6-8 weeks to ensure resolution of these   opacities. 2. Stable small sliding-type hiatal hernia.          XR CHEST STANDARD (2 VW) Final Result   No acute cardiopulmonary disease                Consults:     IP CONSULT TO HOSPITALIST  IP CONSULT TO PULMONOLOGY    Disposition:  Home     Condition at Discharge: Stable    Discharge Instructions/Follow-up:  Home    Code Status:  Full Code     Activity: activity as tolerated    Diet: regular diet      Discharge Medications:     Current Discharge Medication List           Details   cefUROXime (CEFTIN) 500 MG tablet Take 1 tablet by mouth 2 times daily for 10 days  Qty: 20 tablet, Refills: 0              Details   lisinopril (PRINIVIL;ZESTRIL) 10 MG tablet TAKE 1 TABLET BY MOUTH DAILY  Qty: 30 tablet, Refills: 3      diltiazem (TIAZAC) 120 MG extended release capsule TAKE ONE CAPSULE BY MOUTH DAILY  Qty: 90 capsule, Refills: 1    Comments: **Patient requests 90 days supply**  Associated Diagnoses: Chronic atrial fibrillation (HCC)      torsemide (DEMADEX) 20 MG tablet TAKE 1 TABLET BY MOUTH EVERY DAY  Qty: 90 tablet, Refills: 1      potassium chloride (KLOR-CON M) 20 MEQ extended release tablet TAKE 1 TABLET BY MOUTH EVERY DAY  Qty: 90 tablet, Refills: 1      rivaroxaban (XARELTO) 20 MG TABS tablet TAKE 1 TABLET BY MOUTH DAILY WITH BREAKFAST  Qty: 90 tablet, Refills: 3    Associated Diagnoses: Paroxysmal atrial fibrillation (HCC)      rOPINIRole (REQUIP) 0.5 MG tablet Take 0.5 mg by mouth daily      esomeprazole (NEXIUM) 40 MG delayed release capsule Take 40 mg by mouth every morning (before breakfast)      ondansetron (ZOFRAN) 4 MG tablet Take 4 mg by mouth every 6 hours as needed for Nausea or Vomiting      aspirin 81 MG tablet Take 81 mg by mouth daily      DOCUSATE SODIUM PO Take 8.6-50 mg by mouth Takes 2 tablets at bedtime      albuterol sulfate  (90 BASE) MCG/ACT inhaler Inhale 2 puffs into the lungs every 6 hours as needed for Wheezing  Qty: 1 Inhaler, Refills: 0      acetaminophen (TYLENOL) 325 MG tablet Take 650 mg by mouth every 4 hours as needed for Pain      sucralfate (CARAFATE)

## 2019-05-15 NOTE — PROGRESS NOTES
Pt's IV line removed without complications. Discussed d/c instructions with patient, given opportunity to ask questions, and provided new medication education with side effects. Follow up appointment information included in d/c instructions. Pt verbalized understanding of d/c instructions. Patient was discharged to home with all belongings and taken outside via wheelchair.     Aliyah Berrios

## 2019-05-16 LAB
BLOOD CULTURE, ROUTINE: NORMAL
CULTURE, BLOOD 2: NORMAL

## 2019-05-29 DIAGNOSIS — I48.20 CHRONIC ATRIAL FIBRILLATION (HCC): ICD-10-CM

## 2019-05-29 NOTE — TELEPHONE ENCOUNTER
NPTS Pt  Last office visit 2/4/2019  Take your water pill daily for the next week      Labs today     Echocardiogram      appt in 3 months     No upcoming appt scheduled.

## 2019-06-03 RX ORDER — DILTIAZEM HYDROCHLORIDE 120 MG/1
CAPSULE, EXTENDED RELEASE ORAL
Qty: 30 CAPSULE | Refills: 0 | Status: SHIPPED | OUTPATIENT
Start: 2019-06-03 | End: 2019-06-30 | Stop reason: SDUPTHER

## 2019-06-13 ENCOUNTER — TELEPHONE (OUTPATIENT)
Dept: PULMONOLOGY | Age: 74
End: 2019-06-13

## 2019-06-13 RX ORDER — TORSEMIDE 20 MG/1
TABLET ORAL
Qty: 90 TABLET | Refills: 0 | Status: SHIPPED | OUTPATIENT
Start: 2019-06-13 | End: 2019-09-23 | Stop reason: SDUPTHER

## 2019-06-13 NOTE — TELEPHONE ENCOUNTER
Patient did not show for hospital follow up appointment  with Dr. Ion Salinas on 6/13/19    Same Day Cancellation: No    Patient rescheduled:  No    Patient was also no show on: N/A    LOV 9/23/15 (Dr. Shira Poe)

## 2019-06-13 NOTE — TELEPHONE ENCOUNTER
Lov 2/4/2019  Labs 5/12/2019  Lrf 3/12/2019 Disp 90+1  Appt No appt scheduled  Please advise thanks.

## 2019-06-20 ENCOUNTER — TELEPHONE (OUTPATIENT)
Dept: PULMONOLOGY | Age: 74
End: 2019-06-20

## 2019-06-20 NOTE — TELEPHONE ENCOUNTER
Patient cancelled appointment on 6/24/19 with Dr. Radha Adame for hospital follow up. Reason: transportation not available until 7/5/19    Patient did reschedule appointment. Appointment rescheduled for 7/10/19.      Last OV N/A

## 2019-07-08 ENCOUNTER — OFFICE VISIT (OUTPATIENT)
Dept: CARDIOLOGY CLINIC | Age: 74
End: 2019-07-08
Payer: COMMERCIAL

## 2019-07-08 VITALS
HEIGHT: 64 IN | SYSTOLIC BLOOD PRESSURE: 120 MMHG | WEIGHT: 175 LBS | DIASTOLIC BLOOD PRESSURE: 58 MMHG | HEART RATE: 63 BPM | BODY MASS INDEX: 29.88 KG/M2 | OXYGEN SATURATION: 100 %

## 2019-07-08 DIAGNOSIS — I48.20 CHRONIC ATRIAL FIBRILLATION (HCC): ICD-10-CM

## 2019-07-08 PROCEDURE — 99204 OFFICE O/P NEW MOD 45 MIN: CPT | Performed by: INTERNAL MEDICINE

## 2019-07-08 RX ORDER — DILTIAZEM HYDROCHLORIDE 120 MG/1
CAPSULE, EXTENDED RELEASE ORAL
Qty: 90 CAPSULE | Refills: 3 | Status: SHIPPED | OUTPATIENT
Start: 2019-07-08 | End: 2020-01-10

## 2019-07-08 NOTE — PROGRESS NOTES
Yes MARIOLA Veliz CNP   rOPINIRole (REQUIP) 0.5 MG tablet Take 0.5 mg by mouth daily   Yes Historical Provider, MD   esomeprazole (NEXIUM) 40 MG delayed release capsule Take 40 mg by mouth every morning (before breakfast)   Yes Historical Provider, MD   ondansetron (ZOFRAN) 4 MG tablet Take 4 mg by mouth every 6 hours as needed for Nausea or Vomiting   Yes Historical Provider, MD   aspirin 81 MG tablet Take 81 mg by mouth daily   Yes Historical Provider, MD   DOCUSATE SODIUM PO Take 8.6-50 mg by mouth Takes 2 tablets at bedtime   Yes Historical Provider, MD   albuterol sulfate  (90 BASE) MCG/ACT inhaler Inhale 2 puffs into the lungs every 6 hours as needed for Wheezing 2/16/17  Yes Cristi Guerrero DO   acetaminophen (TYLENOL) 325 MG tablet Take 650 mg by mouth every 4 hours as needed for Pain   Yes Historical Provider, MD   sucralfate (CARAFATE) 1 GM tablet Take 1 g by mouth 4 times daily    Yes Historical Provider, MD   CALCIUM CARBONATE-VITAMIN D PO Take 600 mg by mouth 2 times daily    Yes Historical Provider, MD   meclizine (ANTIVERT) 25 MG tablet TAKE 1 TABLET BY MOUTH EVERY DAY  Patient taking differently: prn 8/31/15  Yes MARIOLA Veliz CNP   fluticasone (FLONASE) 50 MCG/ACT nasal spray 1 spray by Nasal route daily. Yes Historical Provider, MD   calcium carbonate (TUMS) 500 MG chewable tablet Take 1 tablet by mouth 2 times daily. 12/23/10  Yes Historical Provider, MD   psyllium (KONSYL) 28.3 % PACK Take 1 packet by mouth daily    Historical Provider, MD        Allergies:  Amoxicillin-pot clavulanate; Betadine [povidone iodine]; Cipro xr; Morphine; Phenergan [promethazine hcl]; Promethazine; Sulfamethoxazole-trimethoprim; Sulfamethoxazole-trimethoprim; Tape [adhesive tape]; Cephalexin; Codeine; and Prochlorperazine edisylate     Review of Systems:   · Constitutional: there has been no unanticipated weight loss.  There's been no change in energy level, sleep pattern, or

## 2019-07-08 NOTE — LETTER
BREAKFAST 11/13/18  Yes MARIOLA Warren CNP   rOPINIRole (REQUIP) 0.5 MG tablet Take 0.5 mg by mouth daily   Yes Historical Provider, MD   esomeprazole (NEXIUM) 40 MG delayed release capsule Take 40 mg by mouth every morning (before breakfast)   Yes Historical Provider, MD   ondansetron (ZOFRAN) 4 MG tablet Take 4 mg by mouth every 6 hours as needed for Nausea or Vomiting   Yes Historical Provider, MD   aspirin 81 MG tablet Take 81 mg by mouth daily   Yes Historical Provider, MD   DOCUSATE SODIUM PO Take 8.6-50 mg by mouth Takes 2 tablets at bedtime   Yes Historical Provider, MD   albuterol sulfate  (90 BASE) MCG/ACT inhaler Inhale 2 puffs into the lungs every 6 hours as needed for Wheezing 2/16/17  Yes Dylan Guerrero,    acetaminophen (TYLENOL) 325 MG tablet Take 650 mg by mouth every 4 hours as needed for Pain   Yes Historical Provider, MD   sucralfate (CARAFATE) 1 GM tablet Take 1 g by mouth 4 times daily    Yes Historical Provider, MD   CALCIUM CARBONATE-VITAMIN D PO Take 600 mg by mouth 2 times daily    Yes Historical Provider, MD   meclizine (ANTIVERT) 25 MG tablet TAKE 1 TABLET BY MOUTH EVERY DAY  Patient taking differently: prn 8/31/15  Yes MARIOLA Warren CNP   fluticasone (FLONASE) 50 MCG/ACT nasal spray 1 spray by Nasal route daily. Yes Historical Provider, MD   calcium carbonate (TUMS) 500 MG chewable tablet Take 1 tablet by mouth 2 times daily. 12/23/10  Yes Historical Provider, MD   psyllium (KONSYL) 28.3 % PACK Take 1 packet by mouth daily    Historical Provider, MD        Allergies:  Amoxicillin-pot clavulanate; Betadine [povidone iodine]; Cipro xr; Morphine; Phenergan [promethazine hcl]; Promethazine; Sulfamethoxazole-trimethoprim; Sulfamethoxazole-trimethoprim; Tape [adhesive tape]; Cephalexin; Codeine; and Prochlorperazine edisylate     Review of Systems:   · Constitutional: there has been no unanticipated weight loss.  There's been no change in energy level, sleep pattern, or activity level. · Eyes: No visual changes or diplopia. No scleral icterus. · ENT: No Headaches, hearing loss or vertigo. No mouth sores or sore throat. · Cardiovascular: Reviewed in HPI  · Respiratory: No cough or wheezing, no sputum production. No hematemesis. · Gastrointestinal: No abdominal pain, appetite loss, blood in stools. No change in bowel or bladder habits. · Genitourinary: No dysuria, trouble voiding, or hematuria. · Musculoskeletal:  No gait disturbance, weakness or joint complaints. · Integumentary: No rash or pruritis. · Neurological: No headache, diplopia, change in muscle strength, numbness or tingling. No change in gait, balance, coordination, mood, affect, memory, mentation, behavior. · Psychiatric: No anxiety, no depression. · Endocrine: No malaise, fatigue or temperature intolerance. No excessive thirst, fluid intake, or urination. No tremor. · Hematologic/Lymphatic: No abnormal bruising or bleeding, blood clots or swollen lymph nodes. · Allergic/Immunologic: No nasal congestion or hives. Physical Examination:    Vitals:    07/08/19 1344   BP: (!) 120/58   Pulse:    SpO2:         Constitutional and General Appearance: NAD   Respiratory:  · Normal excursion and expansion without use of accessory muscles  · Resp Auscultation: Normal breath sounds without dullness  Cardiovascular:  · The apical impulses not displaced  · Heart tones are crisp and normal  · Cervical veins are not engorged  · The carotid upstroke is normal in amplitude and contour without delay or bruit  · Normal S1S2, No S3, No Murmur  · Peripheral pulses are symmetrical and full  · There is no clubbing, cyanosis of the extremities.   · No edema  · Femoral Arteries: 2+ and equal  · Pedal Pulses: 2+ and equal   Abdomen:  · No masses or tenderness  · Liver/Spleen: No Abnormalities Noted  Neurological/Psychiatric:  · Alert and oriented in all spheres The scribes docuementation has been prepared under my direction and personally reviewed by me in its entirety. I confirm that the note above accurately reflects all work, treatment, procedures, and medical decision making performed by me. Dr. Divya Randolph MD      Thank you for allowing me to participate in the care of this individual.      Christina Rocha.  Naila Maravilal M.D., [facility]

## 2019-07-10 ENCOUNTER — OFFICE VISIT (OUTPATIENT)
Dept: PULMONOLOGY | Age: 74
End: 2019-07-10
Payer: COMMERCIAL

## 2019-07-10 VITALS
WEIGHT: 172 LBS | HEIGHT: 64 IN | HEART RATE: 68 BPM | SYSTOLIC BLOOD PRESSURE: 111 MMHG | OXYGEN SATURATION: 100 % | DIASTOLIC BLOOD PRESSURE: 51 MMHG | BODY MASS INDEX: 29.37 KG/M2 | RESPIRATION RATE: 16 BRPM

## 2019-07-10 DIAGNOSIS — R91.8 MULTIPLE PULMONARY NODULES: Primary | ICD-10-CM

## 2019-07-10 PROCEDURE — 99213 OFFICE O/P EST LOW 20 MIN: CPT | Performed by: INTERNAL MEDICINE

## 2019-07-10 ASSESSMENT — ENCOUNTER SYMPTOMS
VOICE CHANGE: 0
CHOKING: 0
CHEST TIGHTNESS: 0
SORE THROAT: 0
EYE ITCHING: 0
EYE DISCHARGE: 0
CONSTIPATION: 0
EYE PAIN: 0
DIARRHEA: 0
STRIDOR: 0
ABDOMINAL PAIN: 0

## 2019-07-10 NOTE — PROGRESS NOTES
MA Communication: The following orders are received by verbal communication from Dr. Amaury Broderick.     Orders include:  CT and OV scheduled 7/31/19
Height: 5' 4\" (1.626 m)       Review of Systems   Constitutional: Negative for chills, fever and unexpected weight change. HENT: Negative for mouth sores, sore throat and voice change. Eyes: Negative for pain, discharge and itching. Respiratory: Negative for choking, chest tightness and stridor. Cardiovascular: Negative for chest pain, palpitations and leg swelling. Gastrointestinal: Negative for abdominal pain, constipation and diarrhea. Endocrine: Negative for cold intolerance, heat intolerance and polydipsia. Genitourinary: Negative for dysuria, frequency and hematuria. Musculoskeletal: Negative for gait problem, joint swelling and neck stiffness. Neurological: Negative for dizziness, numbness and headaches. Psychiatric/Behavioral: Negative for agitation, confusion and hallucinations. Physical Exam   Constitutional: She appears well-developed and well-nourished. No distress. HENT:   Head: Normocephalic and atraumatic. Mouth/Throat: Oropharynx is clear and moist. No oropharyngeal exudate. Eyes: Pupils are equal, round, and reactive to light. EOM are normal.   Neck: Neck supple. No JVD present. Cardiovascular: Normal heart sounds. Exam reveals no gallop and no friction rub. No murmur heard. Pulmonary/Chest: Effort normal. She has no wheezes. She has no rales. Equal chest rise and expansion bilaterally   Abdominal: Soft. Bowel sounds are normal. She exhibits no distension. There is no tenderness. Musculoskeletal: Normal range of motion. She exhibits no edema. Lymphadenopathy:     She has no cervical adenopathy. Neurological: She is alert. No cranial nerve deficit. CN 2-12 grossly intact   Skin: Skin is warm and dry. No rash noted. She is not diaphoretic. ASSESSMENT:    1. Multiple pulmonary nodules      PLAN:    -Repeat CT chest to ensure clearance of previously identified nodules, follow up with the patient after.  Further workup pending results  -Up to date

## 2019-07-31 ENCOUNTER — HOSPITAL ENCOUNTER (OUTPATIENT)
Dept: CT IMAGING | Age: 74
Discharge: HOME | End: 2019-07-31
Payer: COMMERCIAL

## 2019-07-31 ENCOUNTER — OFFICE VISIT (OUTPATIENT)
Dept: PULMONOLOGY | Age: 74
End: 2019-07-31
Payer: COMMERCIAL

## 2019-07-31 VITALS
WEIGHT: 172 LBS | HEIGHT: 64 IN | HEART RATE: 76 BPM | BODY MASS INDEX: 29.37 KG/M2 | DIASTOLIC BLOOD PRESSURE: 53 MMHG | SYSTOLIC BLOOD PRESSURE: 123 MMHG | OXYGEN SATURATION: 100 % | RESPIRATION RATE: 16 BRPM

## 2019-07-31 DIAGNOSIS — Z87.01 HISTORY OF BACTERIAL PNEUMONIA: ICD-10-CM

## 2019-07-31 DIAGNOSIS — Z87.891 FORMER SMOKER: ICD-10-CM

## 2019-07-31 DIAGNOSIS — R91.8 MULTIPLE PULMONARY NODULES: ICD-10-CM

## 2019-07-31 DIAGNOSIS — R91.8 MULTIPLE PULMONARY NODULES: Primary | ICD-10-CM

## 2019-07-31 PROCEDURE — 99213 OFFICE O/P EST LOW 20 MIN: CPT | Performed by: INTERNAL MEDICINE

## 2019-07-31 PROCEDURE — 71250 CT THORAX DX C-: CPT

## 2019-07-31 ASSESSMENT — ENCOUNTER SYMPTOMS: SHORTNESS OF BREATH: 1

## 2019-07-31 NOTE — PATIENT INSTRUCTIONS
Remember to bring all pulmonary medications to your next appointment with the office. Please keep all of your future appointments scheduled by [facility]monary office. Out of respect for other patients and providers, you may be asked to reschedule your appointment if you arrive later than your scheduled appointment time. Appointments cancelled less than 24hrs in advance will be considered a no show. Patients with three missed appointments within 1 year or four missed appointments within 2 years can be dismissed from the practice. You may receive a survey regarding the care you received during your visit. Your input is valuable to us. We encourage you to complete and return your survey. We hope you will choose us in the future for your healthcare needs.

## 2019-08-19 RX ORDER — POTASSIUM CHLORIDE 20 MEQ/1
TABLET, EXTENDED RELEASE ORAL
Qty: 90 TABLET | Refills: 3 | Status: SHIPPED | OUTPATIENT
Start: 2019-08-19 | End: 2020-08-06

## 2019-08-19 RX ORDER — LISINOPRIL 10 MG/1
10 TABLET ORAL DAILY
Qty: 90 TABLET | Refills: 3 | Status: SHIPPED | OUTPATIENT
Start: 2019-08-19 | End: 2020-07-15

## 2019-09-28 RX ORDER — TORSEMIDE 20 MG/1
TABLET ORAL
Qty: 90 TABLET | Refills: 1 | Status: SHIPPED | OUTPATIENT
Start: 2019-09-28 | End: 2020-03-10 | Stop reason: SDUPTHER

## 2019-12-14 DIAGNOSIS — I48.0 PAROXYSMAL ATRIAL FIBRILLATION (HCC): ICD-10-CM

## 2020-01-10 RX ORDER — DILTIAZEM HYDROCHLORIDE 120 MG/1
CAPSULE, EXTENDED RELEASE ORAL
Qty: 30 CAPSULE | Refills: 6 | Status: SHIPPED | OUTPATIENT
Start: 2020-01-10 | End: 2020-01-16

## 2020-01-16 RX ORDER — DILTIAZEM HYDROCHLORIDE 120 MG/1
CAPSULE, EXTENDED RELEASE ORAL
Qty: 90 CAPSULE | Refills: 1 | Status: SHIPPED | OUTPATIENT
Start: 2020-01-16 | End: 2020-08-20

## 2020-03-10 RX ORDER — TORSEMIDE 20 MG/1
TABLET ORAL
Qty: 90 TABLET | Refills: 1 | Status: SHIPPED | OUTPATIENT
Start: 2020-03-10 | End: 2020-08-28 | Stop reason: SDUPTHER

## 2020-07-15 RX ORDER — LISINOPRIL 10 MG/1
10 TABLET ORAL DAILY
Qty: 90 TABLET | Refills: 1 | Status: SHIPPED | OUTPATIENT
Start: 2020-07-15 | End: 2021-03-26

## 2020-08-06 RX ORDER — POTASSIUM CHLORIDE 20 MEQ/1
TABLET, EXTENDED RELEASE ORAL
Qty: 90 TABLET | Refills: 3 | Status: SHIPPED | OUTPATIENT
Start: 2020-08-06 | End: 2021-06-22

## 2020-08-20 ENCOUNTER — TELEPHONE (OUTPATIENT)
Dept: CARDIOLOGY CLINIC | Age: 75
End: 2020-08-20

## 2020-08-20 RX ORDER — DILTIAZEM HYDROCHLORIDE 120 MG/1
CAPSULE, EXTENDED RELEASE ORAL
Qty: 30 CAPSULE | Refills: 2 | Status: SHIPPED | OUTPATIENT
Start: 2020-08-20 | End: 2021-03-03

## 2020-08-20 NOTE — TELEPHONE ENCOUNTER
Spoke with pt, she is scheduled for a telephone call visit with [facility] for 9-16-20 at 100pm.

## 2020-08-20 NOTE — TELEPHONE ENCOUNTER
Pt last seen by [facility] 7/2019. Please call to make yearly apt.

## 2020-08-28 RX ORDER — TORSEMIDE 20 MG/1
TABLET ORAL
Qty: 90 TABLET | Refills: 1 | Status: ON HOLD | OUTPATIENT
Start: 2020-08-28 | End: 2020-10-17 | Stop reason: HOSPADM

## 2020-10-07 ENCOUNTER — APPOINTMENT (OUTPATIENT)
Dept: GENERAL RADIOLOGY | Age: 75
DRG: 871 | End: 2020-10-07
Payer: COMMERCIAL

## 2020-10-07 ENCOUNTER — APPOINTMENT (OUTPATIENT)
Dept: CT IMAGING | Age: 75
DRG: 871 | End: 2020-10-07
Payer: COMMERCIAL

## 2020-10-07 ENCOUNTER — HOSPITAL ENCOUNTER (INPATIENT)
Age: 75
LOS: 10 days | Discharge: NURSING, REHAB OR LONG-TERM CARE | DRG: 871 | End: 2020-10-17
Attending: EMERGENCY MEDICINE | Admitting: FAMILY MEDICINE
Payer: COMMERCIAL

## 2020-10-07 PROBLEM — N39.0 ACUTE UTI: Status: ACTIVE | Noted: 2020-10-07

## 2020-10-07 PROBLEM — I95.9 HYPOTENSION: Status: ACTIVE | Noted: 2020-10-07

## 2020-10-07 PROBLEM — R41.82 ALTERED MENTAL STATUS: Status: ACTIVE | Noted: 2020-10-07

## 2020-10-07 PROBLEM — A41.9 SEPSIS (HCC): Status: ACTIVE | Noted: 2020-10-07

## 2020-10-07 LAB
A/G RATIO: 1.2 (ref 1.1–2.2)
ALBUMIN SERPL-MCNC: 4.3 G/DL (ref 3.4–5)
ALP BLD-CCNC: 64 U/L (ref 40–129)
ALT SERPL-CCNC: 23 U/L (ref 10–40)
ANION GAP SERPL CALCULATED.3IONS-SCNC: 20 MMOL/L (ref 3–16)
AST SERPL-CCNC: 54 U/L (ref 15–37)
BACTERIA: ABNORMAL /HPF
BASOPHILS ABSOLUTE: 0.1 K/UL (ref 0–0.2)
BASOPHILS RELATIVE PERCENT: 0.5 %
BILIRUB SERPL-MCNC: 1.4 MG/DL (ref 0–1)
BILIRUBIN URINE: ABNORMAL
BLOOD, URINE: ABNORMAL
BUN BLDV-MCNC: 63 MG/DL (ref 7–20)
CALCIUM SERPL-MCNC: 10.6 MG/DL (ref 8.3–10.6)
CHLORIDE BLD-SCNC: 97 MMOL/L (ref 99–110)
CLARITY: ABNORMAL
CO2: 20 MMOL/L (ref 21–32)
COLOR: YELLOW
CREAT SERPL-MCNC: 3.8 MG/DL (ref 0.6–1.2)
EOSINOPHILS ABSOLUTE: 0 K/UL (ref 0–0.6)
EOSINOPHILS RELATIVE PERCENT: 0 %
EPITHELIAL CELLS, UA: ABNORMAL /HPF (ref 0–5)
GFR AFRICAN AMERICAN: 14
GFR NON-AFRICAN AMERICAN: 11
GLOBULIN: 3.5 G/DL
GLUCOSE BLD-MCNC: 114 MG/DL (ref 70–99)
GLUCOSE URINE: NEGATIVE MG/DL
HCT VFR BLD CALC: 45.8 % (ref 36–48)
HEMOGLOBIN: 14.6 G/DL (ref 12–16)
KETONES, URINE: ABNORMAL MG/DL
LACTIC ACID: 1.7 MMOL/L (ref 0.4–2)
LEUKOCYTE ESTERASE, URINE: ABNORMAL
LIPASE: 87 U/L (ref 13–60)
LYMPHOCYTES ABSOLUTE: 1.8 K/UL (ref 1–5.1)
LYMPHOCYTES RELATIVE PERCENT: 15.4 %
MCH RBC QN AUTO: 24.3 PG (ref 26–34)
MCHC RBC AUTO-ENTMCNC: 31.9 G/DL (ref 31–36)
MCV RBC AUTO: 76.1 FL (ref 80–100)
MICROSCOPIC EXAMINATION: YES
MONOCYTES ABSOLUTE: 0.8 K/UL (ref 0–1.3)
MONOCYTES RELATIVE PERCENT: 6.7 %
MUCUS: ABNORMAL /LPF
NEUTROPHILS ABSOLUTE: 9.2 K/UL (ref 1.7–7.7)
NEUTROPHILS RELATIVE PERCENT: 77.4 %
NITRITE, URINE: NEGATIVE
PDW BLD-RTO: 20.9 % (ref 12.4–15.4)
PH UA: 5 (ref 5–8)
PLATELET # BLD: 277 K/UL (ref 135–450)
PMV BLD AUTO: 9.6 FL (ref 5–10.5)
POTASSIUM SERPL-SCNC: 4.3 MMOL/L (ref 3.5–5.1)
PROTEIN UA: 30 MG/DL
RBC # BLD: 6.02 M/UL (ref 4–5.2)
RBC UA: ABNORMAL /HPF (ref 0–4)
SODIUM BLD-SCNC: 137 MMOL/L (ref 136–145)
SPECIFIC GRAVITY UA: 1.02 (ref 1–1.03)
TOTAL PROTEIN: 7.8 G/DL (ref 6.4–8.2)
URINE REFLEX TO CULTURE: YES
URINE TYPE: ABNORMAL
UROBILINOGEN, URINE: 1 E.U./DL
WBC # BLD: 11.8 K/UL (ref 4–11)
WBC UA: >100 /HPF (ref 0–5)

## 2020-10-07 PROCEDURE — 87086 URINE CULTURE/COLONY COUNT: CPT

## 2020-10-07 PROCEDURE — 87088 URINE BACTERIA CULTURE: CPT

## 2020-10-07 PROCEDURE — 80053 COMPREHEN METABOLIC PANEL: CPT

## 2020-10-07 PROCEDURE — 0065U SYFLS TST NONTREPONEMAL ANTB: CPT

## 2020-10-07 PROCEDURE — 87077 CULTURE AEROBIC IDENTIFY: CPT

## 2020-10-07 PROCEDURE — 2580000003 HC RX 258: Performed by: NURSE PRACTITIONER

## 2020-10-07 PROCEDURE — 1200000000 HC SEMI PRIVATE

## 2020-10-07 PROCEDURE — 70450 CT HEAD/BRAIN W/O DYE: CPT

## 2020-10-07 PROCEDURE — 87040 BLOOD CULTURE FOR BACTERIA: CPT

## 2020-10-07 PROCEDURE — 86780 TREPONEMA PALLIDUM: CPT

## 2020-10-07 PROCEDURE — 2500000003 HC RX 250 WO HCPCS: Performed by: NURSE PRACTITIONER

## 2020-10-07 PROCEDURE — 93005 ELECTROCARDIOGRAM TRACING: CPT | Performed by: NURSE PRACTITIONER

## 2020-10-07 PROCEDURE — 6360000002 HC RX W HCPCS: Performed by: NURSE PRACTITIONER

## 2020-10-07 PROCEDURE — 36415 COLL VENOUS BLD VENIPUNCTURE: CPT

## 2020-10-07 PROCEDURE — 82746 ASSAY OF FOLIC ACID SERUM: CPT

## 2020-10-07 PROCEDURE — 87076 CULTURE ANAEROBE IDENT EACH: CPT

## 2020-10-07 PROCEDURE — 87186 SC STD MICRODIL/AGAR DIL: CPT

## 2020-10-07 PROCEDURE — 85025 COMPLETE CBC W/AUTO DIFF WBC: CPT

## 2020-10-07 PROCEDURE — 83605 ASSAY OF LACTIC ACID: CPT

## 2020-10-07 PROCEDURE — 83690 ASSAY OF LIPASE: CPT

## 2020-10-07 PROCEDURE — 71045 X-RAY EXAM CHEST 1 VIEW: CPT

## 2020-10-07 PROCEDURE — 99285 EMERGENCY DEPT VISIT HI MDM: CPT

## 2020-10-07 PROCEDURE — 74176 CT ABD & PELVIS W/O CONTRAST: CPT

## 2020-10-07 PROCEDURE — 96375 TX/PRO/DX INJ NEW DRUG ADDON: CPT

## 2020-10-07 PROCEDURE — 2580000003 HC RX 258: Performed by: STUDENT IN AN ORGANIZED HEALTH CARE EDUCATION/TRAINING PROGRAM

## 2020-10-07 PROCEDURE — 81001 URINALYSIS AUTO W/SCOPE: CPT

## 2020-10-07 PROCEDURE — 82607 VITAMIN B-12: CPT

## 2020-10-07 PROCEDURE — 96365 THER/PROPH/DIAG IV INF INIT: CPT

## 2020-10-07 RX ORDER — SODIUM CHLORIDE 0.9 % (FLUSH) 0.9 %
10 SYRINGE (ML) INJECTION EVERY 12 HOURS SCHEDULED
Status: DISCONTINUED | OUTPATIENT
Start: 2020-10-07 | End: 2020-10-17 | Stop reason: HOSPADM

## 2020-10-07 RX ORDER — MECLIZINE HYDROCHLORIDE 25 MG/1
1 TABLET ORAL DAILY
Status: CANCELLED | OUTPATIENT
Start: 2020-10-07

## 2020-10-07 RX ORDER — ROPINIROLE 0.5 MG/1
0.5 TABLET, FILM COATED ORAL DAILY
Status: DISCONTINUED | OUTPATIENT
Start: 2020-10-08 | End: 2020-10-17 | Stop reason: HOSPADM

## 2020-10-07 RX ORDER — ACETAMINOPHEN 325 MG/1
650 TABLET ORAL EVERY 6 HOURS PRN
Status: CANCELLED | OUTPATIENT
Start: 2020-10-07

## 2020-10-07 RX ORDER — ONDANSETRON 2 MG/ML
4 INJECTION INTRAMUSCULAR; INTRAVENOUS ONCE
Status: COMPLETED | OUTPATIENT
Start: 2020-10-07 | End: 2020-10-07

## 2020-10-07 RX ORDER — DILTIAZEM HYDROCHLORIDE 120 MG/1
120 CAPSULE, COATED, EXTENDED RELEASE ORAL DAILY
Status: DISCONTINUED | OUTPATIENT
Start: 2020-10-08 | End: 2020-10-17 | Stop reason: HOSPADM

## 2020-10-07 RX ORDER — ACETAMINOPHEN 650 MG/1
650 SUPPOSITORY RECTAL EVERY 6 HOURS PRN
Status: CANCELLED | OUTPATIENT
Start: 2020-10-07

## 2020-10-07 RX ORDER — SODIUM CHLORIDE 9 MG/ML
INJECTION, SOLUTION INTRAVENOUS CONTINUOUS
Status: DISCONTINUED | OUTPATIENT
Start: 2020-10-07 | End: 2020-10-13

## 2020-10-07 RX ORDER — SODIUM CHLORIDE 0.9 % (FLUSH) 0.9 %
10 SYRINGE (ML) INJECTION EVERY 12 HOURS SCHEDULED
Status: CANCELLED | OUTPATIENT
Start: 2020-10-07

## 2020-10-07 RX ORDER — POLYETHYLENE GLYCOL 3350 17 G/17G
17 POWDER, FOR SOLUTION ORAL DAILY PRN
Status: DISCONTINUED | OUTPATIENT
Start: 2020-10-07 | End: 2020-10-17 | Stop reason: HOSPADM

## 2020-10-07 RX ORDER — ONDANSETRON 4 MG/1
4 TABLET, FILM COATED ORAL EVERY 6 HOURS PRN
Status: DISCONTINUED | OUTPATIENT
Start: 2020-10-07 | End: 2020-10-07 | Stop reason: CLARIF

## 2020-10-07 RX ORDER — ACETAMINOPHEN 650 MG/1
650 SUPPOSITORY RECTAL EVERY 6 HOURS PRN
Status: DISCONTINUED | OUTPATIENT
Start: 2020-10-07 | End: 2020-10-17 | Stop reason: HOSPADM

## 2020-10-07 RX ORDER — SODIUM CHLORIDE 0.9 % (FLUSH) 0.9 %
10 SYRINGE (ML) INJECTION PRN
Status: CANCELLED | OUTPATIENT
Start: 2020-10-07

## 2020-10-07 RX ORDER — ONDANSETRON 4 MG/1
4 TABLET, ORALLY DISINTEGRATING ORAL EVERY 6 HOURS PRN
Status: DISCONTINUED | OUTPATIENT
Start: 2020-10-07 | End: 2020-10-17 | Stop reason: HOSPADM

## 2020-10-07 RX ORDER — LISINOPRIL 10 MG/1
10 TABLET ORAL DAILY
Status: DISCONTINUED | OUTPATIENT
Start: 2020-10-08 | End: 2020-10-17 | Stop reason: HOSPADM

## 2020-10-07 RX ORDER — TORSEMIDE 20 MG/1
20 TABLET ORAL ONCE
Status: DISCONTINUED | OUTPATIENT
Start: 2020-10-07 | End: 2020-10-07

## 2020-10-07 RX ORDER — ALBUTEROL SULFATE 2.5 MG/3ML
2.5 SOLUTION RESPIRATORY (INHALATION) EVERY 4 HOURS PRN
Status: DISCONTINUED | OUTPATIENT
Start: 2020-10-07 | End: 2020-10-07

## 2020-10-07 RX ORDER — 0.9 % SODIUM CHLORIDE 0.9 %
500 INTRAVENOUS SOLUTION INTRAVENOUS ONCE
Status: COMPLETED | OUTPATIENT
Start: 2020-10-07 | End: 2020-10-07

## 2020-10-07 RX ORDER — ASPIRIN 81 MG/1
81 TABLET, CHEWABLE ORAL DAILY
Status: DISCONTINUED | OUTPATIENT
Start: 2020-10-08 | End: 2020-10-17 | Stop reason: HOSPADM

## 2020-10-07 RX ORDER — LISINOPRIL 10 MG/1
1 TABLET ORAL DAILY
Status: CANCELLED | OUTPATIENT
Start: 2020-10-07

## 2020-10-07 RX ORDER — PANTOPRAZOLE SODIUM 40 MG/1
40 TABLET, DELAYED RELEASE ORAL
Status: DISCONTINUED | OUTPATIENT
Start: 2020-10-08 | End: 2020-10-10

## 2020-10-07 RX ORDER — ALBUTEROL SULFATE 2.5 MG/3ML
2.5 SOLUTION RESPIRATORY (INHALATION) EVERY 4 HOURS PRN
Status: DISCONTINUED | OUTPATIENT
Start: 2020-10-07 | End: 2020-10-17 | Stop reason: HOSPADM

## 2020-10-07 RX ORDER — ONDANSETRON 2 MG/ML
4 INJECTION INTRAMUSCULAR; INTRAVENOUS EVERY 6 HOURS PRN
Status: CANCELLED | OUTPATIENT
Start: 2020-10-07

## 2020-10-07 RX ORDER — POLYETHYLENE GLYCOL 3350 17 G/17G
17 POWDER, FOR SOLUTION ORAL DAILY PRN
Status: CANCELLED | OUTPATIENT
Start: 2020-10-07

## 2020-10-07 RX ORDER — POTASSIUM CHLORIDE 20 MEQ/1
1 TABLET, EXTENDED RELEASE ORAL DAILY
Status: CANCELLED | OUTPATIENT
Start: 2020-10-07

## 2020-10-07 RX ORDER — 0.9 % SODIUM CHLORIDE 0.9 %
1000 INTRAVENOUS SOLUTION INTRAVENOUS ONCE
Status: COMPLETED | OUTPATIENT
Start: 2020-10-07 | End: 2020-10-07

## 2020-10-07 RX ORDER — ACETAMINOPHEN 325 MG/1
650 TABLET ORAL EVERY 4 HOURS PRN
Status: DISCONTINUED | OUTPATIENT
Start: 2020-10-07 | End: 2020-10-17 | Stop reason: HOSPADM

## 2020-10-07 RX ORDER — ACETAMINOPHEN 325 MG/1
650 TABLET ORAL EVERY 6 HOURS PRN
Status: DISCONTINUED | OUTPATIENT
Start: 2020-10-07 | End: 2020-10-17 | Stop reason: HOSPADM

## 2020-10-07 RX ORDER — MECLIZINE HCL 12.5 MG/1
25 TABLET ORAL DAILY PRN
Status: DISCONTINUED | OUTPATIENT
Start: 2020-10-07 | End: 2020-10-17 | Stop reason: HOSPADM

## 2020-10-07 RX ORDER — FLUTICASONE PROPIONATE 50 MCG
1 SPRAY, SUSPENSION (ML) NASAL DAILY
Status: DISCONTINUED | OUTPATIENT
Start: 2020-10-08 | End: 2020-10-17 | Stop reason: HOSPADM

## 2020-10-07 RX ORDER — SODIUM CHLORIDE 0.9 % (FLUSH) 0.9 %
10 SYRINGE (ML) INJECTION PRN
Status: DISCONTINUED | OUTPATIENT
Start: 2020-10-07 | End: 2020-10-17 | Stop reason: HOSPADM

## 2020-10-07 RX ORDER — HEPARIN SODIUM 5000 [USP'U]/ML
5000 INJECTION, SOLUTION INTRAVENOUS; SUBCUTANEOUS EVERY 8 HOURS SCHEDULED
Status: CANCELLED | OUTPATIENT
Start: 2020-10-07

## 2020-10-07 RX ADMIN — SODIUM CHLORIDE: 9 INJECTION, SOLUTION INTRAVENOUS at 23:42

## 2020-10-07 RX ADMIN — ONDANSETRON 4 MG: 2 INJECTION INTRAMUSCULAR; INTRAVENOUS at 17:51

## 2020-10-07 RX ADMIN — AZTREONAM 500 MG: 1 INJECTION, POWDER, LYOPHILIZED, FOR SOLUTION INTRAMUSCULAR; INTRAVENOUS at 20:20

## 2020-10-07 RX ADMIN — SODIUM CHLORIDE 500 ML: 9 INJECTION, SOLUTION INTRAVENOUS at 22:20

## 2020-10-07 RX ADMIN — SODIUM CHLORIDE 1000 ML: 9 INJECTION, SOLUTION INTRAVENOUS at 17:51

## 2020-10-07 NOTE — H&P
Inpatient Family Medicine Service History & Physical        PCP: Bebeto Phoenix MD    Date of Admission: 10/7/2020    Date of Service: Pt seen/examined on 10/7/2020 and Admitted to Inpatient with expected LOS greater than two midnights due to medical therapy. Placed in Inpatient. Chief Complaint:  AMS      History Of Present Illness:   66 y.o. female who presented to [facility] with AMS. Patient is unable to give much information, but son was available to give story. Patient was showing fatigue and AMS. Also was eating less food. Went to doctor on 9/29 and was found to have PATITO. Wasn't able to give a urine at that visit. Mental status continued to deteriorate over the past few days and was unable to take care of herself. Has abdominal pain and some back pain. Also fatigue. No fever/chills. No chest pain. A little shortness of breath. No N/V/D. Is unsure of if there was any pain with urination or blood in her urine. Keeps say \"I don't know\" and asks \"Can I move? \"    While in the ED, patient was consistently uncomfortable on her bed. Labwork showed significant PATITO with Creatitine of 3.8 and GFR of 11. Mild AST, bilirubin, lipase elevation. Elevated WBC of 11.8.  UA significant for likely UTI. No acute changes on head CT. Abdominal CT showed gallstones but no other acute findings. Because of patient's intolerance to multiple medications int he past, ED consulted pharmacy and decided to start Aztreonam to treat UTI. Patient admitted to hospital for further care. Past Medical History:          Diagnosis Date    Allergic     Anemia     Atrial fibrillation (HCC)     Breast cancer ([facility] [address]     s/p left mastectomy 2001    Cardiac pacemaker in situ 10/1/2015 left    AV node ablation and Bi-V PPM ([employer])    Cellulitis     right leg.     Diffuse pneumonia 5/9/2019    Hypertension     Murmur     Osteoarthritis     Sinus drainage        Past Surgical History:          Procedure Laterality mg by mouth 2 times daily     Historical Provider, MD       Allergies:  Amoxicillin-pot clavulanate; Betadine [povidone iodine]; Cipro xr; Morphine; Phenergan [promethazine hcl]; Promethazine; Sulfamethoxazole-trimethoprim; Sulfamethoxazole-trimethoprim; Tape [adhesive tape]; Cephalexin; Codeine; and Prochlorperazine edisylate    Social History:      The patient currently lives with daughter and 2 grandsons. TOBACCO:   reports that she quit smoking about 26 years ago. Her smoking use included cigarettes. She has never used smokeless tobacco.  ETOH:   reports no history of alcohol use. Family History:      Reviewed in detail and negative for DM, CAD, Cancer, CVA. Positive as follows:        Problem Relation Age of Onset    Arthritis Paternal Grandfather     Heart Disease Mother     High Blood Pressure Mother     Heart Disease Father     High Blood Pressure Father        REVIEW OF SYSTEMS:   Pertinent positives as noted in the HPI. All other systems reviewed and negative. PHYSICAL EXAM PERFORMED:    BP (!) 107/54   Pulse 59   Temp 98.4 °F (36.9 °C) (Oral)   Resp 18   Ht 5' 4\" (1.626 m)   Wt 155 lb (70.3 kg)   SpO2 97%   BMI 26.61 kg/m²     Physical Exam  Vitals signs reviewed. Constitutional:       General: She is not in acute distress. Appearance: Normal appearance. She is normal weight. She is not ill-appearing. HENT:      Head: Normocephalic and atraumatic. Eyes:      Conjunctiva/sclera: Conjunctivae normal.   Cardiovascular:      Rate and Rhythm: Normal rate and regular rhythm. Pulses: Normal pulses. Heart sounds: No murmur. No friction rub. No gallop. Pulmonary:      Effort: Pulmonary effort is normal.      Breath sounds: No wheezing, rhonchi or rales. Abdominal:      General: Abdomen is flat. Bowel sounds are normal. There is no distension. Palpations: Abdomen is soft. Tenderness: There is abdominal tenderness (suprapubic). There is right CVA tenderness. There is no left CVA tenderness, guarding or rebound. Musculoskeletal:      Right lower leg: No edema. Left lower leg: No edema. Skin:     General: Skin is warm and dry. Neurological:      General: No focal deficit present. Mental Status: She is alert. She is disoriented. Cranial Nerves: No cranial nerve deficit. Motor: No weakness. Psychiatric:         Mood and Affect: Mood normal.         Behavior: Behavior normal.           Labs:     Recent Labs     10/07/20  1615   WBC 11.8*   HGB 14.6   HCT 45.8        Recent Labs     10/07/20  1615      K 4.3   CL 97*   CO2 20*   BUN 63*   CREATININE 3.8*   CALCIUM 10.6     Recent Labs     10/07/20  1615   AST 54*   ALT 23   BILITOT 1.4*   ALKPHOS 64     No results for input(s): INR in the last 72 hours. No results for input(s): Zettie Binet in the last 72 hours. Urinalysis:      Lab Results   Component Value Date    NITRU Negative 10/07/2020    WBCUA >100 10/07/2020    BACTERIA 4+ 10/07/2020    RBCUA see below 10/07/2020    BLOODU SMALL 10/07/2020    SPECGRAV 1.025 10/07/2020    GLUCOSEU Negative 10/07/2020    GLUCOSEU NEGATIVE 04/24/2012       Radiology:     CXR: I have reviewed the CXR with the following interpretation: Negative  EKG:  I have reviewed the EKG with the following interpretation: Paced rhythm. CT HEAD WO CONTRAST   Final Result   No acute intracranial abnormality. Atrophy more pronounced in the posterior fossa, unchanged. CT ABDOMEN PELVIS WO CONTRAST Additional Contrast? None   Final Result   No acute findings. Gallstones. XR CHEST PORTABLE   Final Result   Negative portable chest x-ray.              ASSESSMENT:    Active Hospital Problems    Diagnosis Date Noted    Acute UTI [N39.0] 10/07/2020     Priority: High    Sepsis ([address] [A41.9] 10/07/2020     Priority: High    Hypotension [I95.9] 10/07/2020     Priority: Medium    Altered mental status [R41.82] 10/07/2020     Priority:

## 2020-10-07 NOTE — ED NOTES
Fall risk screening completed.  Fall risk bracelet applied to patient.  Non-skid socks provided and placed on patient. Fatimah Payment fall risk is indicated using  dome light .  Based on score, a bed alarm was indicated and applied.  The call light is within the patient's reach, and instructions for use were provided.  The bed is in the lowest position with wheels locked.  The patient has been advised to notify staff, using the call light, if there is a need to get up or use restroom.  The patient verbalized understanding of safety precautions and how to contact staff for assistance.          Stephanie Mtz RN  10/07/20 5502

## 2020-10-07 NOTE — ED PROVIDER NOTES
I independently performed a history and physical on Trent Santoyo. All diagnostic, treatment, and disposition decisions were made by myself in conjunction with the advanced practice provider. I have participated in the medical decision making and directed the treatment plan and disposition of the patient. For further details of [facility] emergency department encounter, please see the advanced practice provider's documentation. CHIEF COMPLAINT      Briefly, Trent Santoyo is a 66 y.o. female  who presents to the ED complaining of altered mental status. Reported decreased oral intake at home    FOCUSED PHYSICAL EXAMINATION  BP (!) 93/49   Pulse 62   Temp 97.5 °F (36.4 °C) (Oral)   Resp 19   Ht 5' 4\" (1.626 m)   Wt 155 lb (70.3 kg)   SpO2 100%   BMI 26.61 kg/m²      Focused physical examination:  General appearance:  Cooperative. No acute distress. Skin:  Warm. Dry. Eye:  Extraocular movements intact. Ears, nose, mouth and throat:  Oral mucosa moist,  Neck:  Trachea midline. Heart:  Regular rate and rhythm  Perfusion:  intact  Respiratory:  Lungs clear to auscultation bilaterally. Respirations nonlabored. Abdominal:   Non distended. Lower abdominal tenderness  Neurological:  Alert and oriented to person only. Moves all extremities spontaneously  Musculoskeletal:   Normal ROM, no deformities          Psychiatric:  Normal mood      EKG: Electronic ventricular pacemaker at a rate of 60 bpm.  Likely underlying atrial fibrillation. Compared to prior from 2/12/2017 no change. No ST elevation. MDM: Patient presents emergency department today with reported altered mental status. Appears clinically dehydrated. Laboratory studies do show evidence of acute renal failure. She was found to have leukocytosis and obvious evidence of urinary tract infection.   Patient was given IV fluids and antibiotics and will be admitted to the hospital    During the patient's ED course, the patient was given:  Medications   0.9 % sodium chloride bolus (1,000 mLs Intravenous New Bag 10/7/20 1751)   ondansetron (ZOFRAN) injection 4 mg (4 mg Intravenous Given 10/7/20 1751)        CLINICAL IMPRESSION  1. Altered mental status, unspecified altered mental status type    2. Acute renal failure, unspecified acute renal failure type ([facility] [address]    3. Acute cystitis without hematuria        DISPOSITION  Admission        This chart was created using Dragon dictation software. Efforts were made by me to ensure accuracy, however some errors may be present due to limitations of this technology.            Alicia Paris MD  10/07/20 3586

## 2020-10-07 NOTE — ED PROVIDER NOTES
Relation Age of Onset    Arthritis Paternal Grandfather     Heart Disease Mother     High Blood Pressure Mother     Heart Disease Father     High Blood Pressure Father      Social History     Socioeconomic History    Marital status:      Spouse name: Not on file    Number of children: Not on file    Years of education: Not on file    Highest education level: Not on file   Occupational History    Not on file   Social Needs    Financial resource strain: Not on file    Food insecurity     Worry: Not on file     Inability: Not on file    Transportation needs     Medical: Not on file     Non-medical: Not on file   Tobacco Use    Smoking status: Former Smoker     Types: Cigarettes     Last attempt to quit: 1994     Years since quittin.4    Smokeless tobacco: Never Used    Tobacco comment: 5 cigarettes per day for 3-4 years   Substance and Sexual Activity    Alcohol use: No     Alcohol/week: 0.0 standard drinks    Drug use: No    Sexual activity: Not on file   Lifestyle    Physical activity     Days per week: Not on file     Minutes per session: Not on file    Stress: Not on file   Relationships    Social connections     Talks on phone: Not on file     Gets together: Not on file     Attends Congregational service: Not on file     Active member of club or organization: Not on file     Attends meetings of clubs or organizations: Not on file     Relationship status: Not on file    Intimate partner violence     Fear of current or ex partner: Not on file     Emotionally abused: Not on file     Physically abused: Not on file     Forced sexual activity: Not on file   Other Topics Concern    Not on file   Social History Narrative    Not on file     No current facility-administered medications for this encounter.       Current Outpatient Medications   Medication Sig Dispense Refill    torsemide (DEMADEX) 20 MG tablet Take 1 tablet daily 90 tablet 1    rivaroxaban (XARELTO) 20 MG TABS tablet TAKE 1 TABLET BY MOUTH DAILY WITH BREAKFAST 90 tablet 1    dilTIAZem (TIAZAC) 120 MG extended release capsule TAKE ONE CAPSULE BY MOUTH DAILY 30 capsule 2    potassium chloride (KLOR-CON M) 20 MEQ extended release tablet TAKE 1 TABLET BY MOUTH EVERY DAY 90 tablet 3    lisinopril (PRINIVIL;ZESTRIL) 10 MG tablet TAKE 1 TABLET BY MOUTH DAILY 90 tablet 1    rOPINIRole (REQUIP) 0.5 MG tablet Take 0.5 mg by mouth daily      psyllium (KONSYL) 28.3 % PACK Take 1 packet by mouth daily      esomeprazole (NEXIUM) 40 MG delayed release capsule Take 40 mg by mouth every morning (before breakfast)      ondansetron (ZOFRAN) 4 MG tablet Take 4 mg by mouth every 6 hours as needed for Nausea or Vomiting      aspirin 81 MG tablet Take 81 mg by mouth daily      DOCUSATE SODIUM PO Take 8.6-50 mg by mouth Takes 2 tablets at bedtime      albuterol sulfate  (90 BASE) MCG/ACT inhaler Inhale 2 puffs into the lungs every 6 hours as needed for Wheezing 1 Inhaler 0    acetaminophen (TYLENOL) 325 MG tablet Take 650 mg by mouth every 4 hours as needed for Pain      sucralfate (CARAFATE) 1 GM tablet Take 1 g by mouth 4 times daily       CALCIUM CARBONATE-VITAMIN D PO Take 600 mg by mouth 2 times daily       meclizine (ANTIVERT) 25 MG tablet TAKE 1 TABLET BY MOUTH EVERY DAY (Patient taking differently: prn) 30 tablet 0    fluticasone (FLONASE) 50 MCG/ACT nasal spray 1 spray by Nasal route daily.  calcium carbonate (TUMS) 500 MG chewable tablet Take 1 tablet by mouth 2 times daily.        Allergies   Allergen Reactions    Amoxicillin-Pot Clavulanate     Betadine [Povidone Iodine]     Cipro Xr     Morphine     Phenergan [Promethazine Hcl]     Promethazine     Sulfamethoxazole-Trimethoprim     Sulfamethoxazole-Trimethoprim     Tape [Adhesive Tape] Itching    Cephalexin Rash    Codeine Nausea And Vomiting    Prochlorperazine Edisylate Anxiety       REVIEW OF SYSTEMS  10 systems reviewed, pertinent positives per HPI Electronic ventricular pacemaker. When compared with ECG of 2/12 1217 ventricular rate has decreased by 7 bpm.    Interventions:  Patient was given Zofran for reported dry heaves per her son. Patient was given a liter bolus of normal saline for hypotensive state. Patient was administered Azactam 500 mg IV per pharmacy recommendation as the patient has multiple allergies to antibiotics. Considered narcotic pain medications but due to patient's hypotensive state medication was not given. Considered CT abdomen pelvis with contrast but due to the patient's PATITO patient was dry scanned. Reevaluation:  Patient no longer dry heaving. She is slightly agitated as she wants to \"get up\" but is unable to do so. A discussion was had with Mrs. Iftkihar Lopez son regarding altered mental status, urinary tract infection, acute kidney injury, and intention to admit to the hospital for further evaluation and treatment. .  Risk management discussed and shared decision making had with patient and/or surrogate. All questions were answered. All questions answered. Patient's son is agreeable with this plan. CRITICAL CARE TIME  0 Minutes of critical care time spent not including separately billable procedures. MDM  Patient presents to emergency department for symptoms concerning for altered mental status, acute kidney injury, and urinary tract infection. Alternate diagnoses are less likely based on history and physical. Considered hypoxia but less likely as patient is 97% O2 on room air. Considered hypoglycemia but less likely as patient's glucose is 114 mg/dL. Considered pneumonia but less likely as CXR does not reveal acute processes/consolidation or infiltrates. Considered nephrolithiasis less likely as no flank pain or urinary symptoms most notably frequency. Considered cholecystitis but no back pain or radiographic-CT evidence of cholecystitis such as gallbladder wall thickening.   Considered subarachnoid bleed but CT head reveals no acute intracranial abnormality. Clinical Impression:  Altered mental status  Acute kidney injury  Acute cystitis without hematuria        Disposition:  Admitted      Patient will be admitted to hospital for further evaluation and treatment.        Hospitalist: Dr. Candelario Jovel      Discussed patients HPI, ED work-up, results, treatment, and response with my attending physician Dr. Prince Dempsey and the Hospitalist -Dr. Candelario Jovel who agrees to admit the patient to the hospital.           Fide Dowell, MARIOLA - CNP  10/08/20 0234

## 2020-10-07 NOTE — ED NOTES
Blood culture set #1 drawn from [facility] with angio. Bottle tops scrubbed with alcohol pads. Site prepped with [employer] swab, 15 seconds per side, and allowed to dry for 30 seconds prior to venipuncture. Red waste tube drawn prior to collection of specimen.        Foreign Keller RN  10/07/20 1206

## 2020-10-07 NOTE — ED NOTES
Bed: 02  Expected date: 10/7/20  Expected time: 3:48 PM  Means of arrival: [facility]  Comments:  [address]     Keila Jensen RN  10/07/20 1600

## 2020-10-08 LAB
A/G RATIO: 1.2 (ref 1.1–2.2)
ALBUMIN SERPL-MCNC: 3.1 G/DL (ref 3.4–5)
ALP BLD-CCNC: 47 U/L (ref 40–129)
ALT SERPL-CCNC: 11 U/L (ref 10–40)
ANION GAP SERPL CALCULATED.3IONS-SCNC: 14 MMOL/L (ref 3–16)
AST SERPL-CCNC: 37 U/L (ref 15–37)
BASOPHILS ABSOLUTE: 0.1 K/UL (ref 0–0.2)
BASOPHILS RELATIVE PERCENT: 0.8 %
BILIRUB SERPL-MCNC: 0.9 MG/DL (ref 0–1)
BUN BLDV-MCNC: 52 MG/DL (ref 7–20)
CALCIUM SERPL-MCNC: 8.9 MG/DL (ref 8.3–10.6)
CHLORIDE BLD-SCNC: 104 MMOL/L (ref 99–110)
CO2: 19 MMOL/L (ref 21–32)
CREAT SERPL-MCNC: 2.8 MG/DL (ref 0.6–1.2)
EKG ATRIAL RATE: 60 BPM
EKG DIAGNOSIS: NORMAL
EKG P AXIS: 52 DEGREES
EKG Q-T INTERVAL: 470 MS
EKG QRS DURATION: 116 MS
EKG QTC CALCULATION (BAZETT): 470 MS
EKG R AXIS: -28 DEGREES
EKG T AXIS: 67 DEGREES
EKG VENTRICULAR RATE: 60 BPM
EOSINOPHILS ABSOLUTE: 0.1 K/UL (ref 0–0.6)
EOSINOPHILS RELATIVE PERCENT: 0.9 %
GFR AFRICAN AMERICAN: 20
GFR NON-AFRICAN AMERICAN: 16
GLOBULIN: 2.5 G/DL
GLUCOSE BLD-MCNC: 99 MG/DL (ref 70–99)
HCT VFR BLD CALC: 38.6 % (ref 36–48)
HEMOGLOBIN: 12.1 G/DL (ref 12–16)
LACTIC ACID: 0.9 MMOL/L (ref 0.4–2)
LYMPHOCYTES ABSOLUTE: 2.3 K/UL (ref 1–5.1)
LYMPHOCYTES RELATIVE PERCENT: 26 %
MCH RBC QN AUTO: 24.4 PG (ref 26–34)
MCHC RBC AUTO-ENTMCNC: 31.3 G/DL (ref 31–36)
MCV RBC AUTO: 77.8 FL (ref 80–100)
MONOCYTES ABSOLUTE: 0.8 K/UL (ref 0–1.3)
MONOCYTES RELATIVE PERCENT: 9.2 %
NEUTROPHILS ABSOLUTE: 5.6 K/UL (ref 1.7–7.7)
NEUTROPHILS RELATIVE PERCENT: 63.1 %
PDW BLD-RTO: 21.1 % (ref 12.4–15.4)
PLATELET # BLD: 189 K/UL (ref 135–450)
PMV BLD AUTO: 9.5 FL (ref 5–10.5)
POTASSIUM REFLEX MAGNESIUM: 3.6 MMOL/L (ref 3.5–5.1)
RBC # BLD: 4.96 M/UL (ref 4–5.2)
SODIUM BLD-SCNC: 137 MMOL/L (ref 136–145)
TOTAL PROTEIN: 5.6 G/DL (ref 6.4–8.2)
WBC # BLD: 8.9 K/UL (ref 4–11)

## 2020-10-08 PROCEDURE — 2580000003 HC RX 258: Performed by: STUDENT IN AN ORGANIZED HEALTH CARE EDUCATION/TRAINING PROGRAM

## 2020-10-08 PROCEDURE — 97530 THERAPEUTIC ACTIVITIES: CPT

## 2020-10-08 PROCEDURE — 85025 COMPLETE CBC W/AUTO DIFF WBC: CPT

## 2020-10-08 PROCEDURE — 6370000000 HC RX 637 (ALT 250 FOR IP): Performed by: STUDENT IN AN ORGANIZED HEALTH CARE EDUCATION/TRAINING PROGRAM

## 2020-10-08 PROCEDURE — 83605 ASSAY OF LACTIC ACID: CPT

## 2020-10-08 PROCEDURE — 2500000003 HC RX 250 WO HCPCS: Performed by: STUDENT IN AN ORGANIZED HEALTH CARE EDUCATION/TRAINING PROGRAM

## 2020-10-08 PROCEDURE — 97162 PT EVAL MOD COMPLEX 30 MIN: CPT

## 2020-10-08 PROCEDURE — 6360000002 HC RX W HCPCS: Performed by: STUDENT IN AN ORGANIZED HEALTH CARE EDUCATION/TRAINING PROGRAM

## 2020-10-08 PROCEDURE — 97166 OT EVAL MOD COMPLEX 45 MIN: CPT

## 2020-10-08 PROCEDURE — 36415 COLL VENOUS BLD VENIPUNCTURE: CPT

## 2020-10-08 PROCEDURE — 80053 COMPREHEN METABOLIC PANEL: CPT

## 2020-10-08 PROCEDURE — 1200000000 HC SEMI PRIVATE

## 2020-10-08 PROCEDURE — 93010 ELECTROCARDIOGRAM REPORT: CPT | Performed by: INTERNAL MEDICINE

## 2020-10-08 RX ORDER — POTASSIUM CHLORIDE 7.45 MG/ML
10 INJECTION INTRAVENOUS
Status: COMPLETED | OUTPATIENT
Start: 2020-10-08 | End: 2020-10-08

## 2020-10-08 RX ORDER — 0.9 % SODIUM CHLORIDE 0.9 %
500 INTRAVENOUS SOLUTION INTRAVENOUS ONCE
Status: COMPLETED | OUTPATIENT
Start: 2020-10-08 | End: 2020-10-08

## 2020-10-08 RX ORDER — POTASSIUM CHLORIDE 7.45 MG/ML
20 INJECTION INTRAVENOUS ONCE
Status: DISCONTINUED | OUTPATIENT
Start: 2020-10-08 | End: 2020-10-08 | Stop reason: CLARIF

## 2020-10-08 RX ADMIN — SODIUM CHLORIDE: 9 INJECTION, SOLUTION INTRAVENOUS at 13:01

## 2020-10-08 RX ADMIN — ROPINIROLE HYDROCHLORIDE 0.5 MG: 0.5 TABLET, FILM COATED ORAL at 17:28

## 2020-10-08 RX ADMIN — AZTREONAM 250 MG: 1 INJECTION, POWDER, LYOPHILIZED, FOR SOLUTION INTRAMUSCULAR; INTRAVENOUS at 04:46

## 2020-10-08 RX ADMIN — POTASSIUM CHLORIDE 10 MEQ: 10 INJECTION, SOLUTION INTRAVENOUS at 18:24

## 2020-10-08 RX ADMIN — MICONAZOLE NITRATE: 20 POWDER TOPICAL at 22:40

## 2020-10-08 RX ADMIN — AZTREONAM 250 MG: 1 INJECTION, POWDER, LYOPHILIZED, FOR SOLUTION INTRAMUSCULAR; INTRAVENOUS at 13:01

## 2020-10-08 RX ADMIN — FLUTICASONE PROPIONATE 1 SPRAY: 50 SPRAY, METERED NASAL at 10:09

## 2020-10-08 RX ADMIN — ASPIRIN 81 MG: 81 TABLET, CHEWABLE ORAL at 10:09

## 2020-10-08 RX ADMIN — AZTREONAM 250 MG: 1 INJECTION, POWDER, LYOPHILIZED, FOR SOLUTION INTRAMUSCULAR; INTRAVENOUS at 22:34

## 2020-10-08 RX ADMIN — POTASSIUM CHLORIDE 10 MEQ: 10 INJECTION, SOLUTION INTRAVENOUS at 22:38

## 2020-10-08 RX ADMIN — RIVAROXABAN 15 MG: 15 TABLET, FILM COATED ORAL at 17:28

## 2020-10-08 RX ADMIN — SODIUM CHLORIDE: 9 INJECTION, SOLUTION INTRAVENOUS at 22:39

## 2020-10-08 RX ADMIN — SODIUM CHLORIDE 500 ML: 9 INJECTION, SOLUTION INTRAVENOUS at 01:18

## 2020-10-08 NOTE — CARE COORDINATION
CASE MANAGEMENT INITIAL ASSESSMENT      Reviewed chart and completed assessment via telephone with:elizabeth Ruiz  Explained Case Management role/services. Primary contact information:[facility] Tita:    Who do you trust or have selected to make healthcare decisions for you? Name:   Neema Cabrera  Phone  Number: [phone]  Can this person be reached and be able to respond quickly, such as within a few minutes or hours? y  Who would be your back-up decision maker? Name Barbara Mclean  Phone Patricia Jaylen    Admit date/status:10/7/20  [address] UTI   Is this a Readmission?: n     Insurance:[employer]osalind Renuka   Precert required for SNF:y      3 night stay required: y    Living arrangements, Adls, care needs, prior to admission:lives in ranch style home with daughter and adult grandson    Transportation:tbd     Durable Medical Equipment at home:  Walker_x_Cane__RTS_x_ BSC__Shower Chairx__  02__ HHN__ CPAP__  BiPap__  Hospital Bed__ W/C___ Other____grab bars______    Services in the home and/or outpatient, prior to [address] (if applicable)   · Name:  · Address:  · Dialysis Schedule:  · Phone:  · Fax:    PT[facility]OT recs:[facility]    Hospital Exemption Notification (HEN):needed for SNF    Barriers to discharge:none    Plan/comments:patient confused. Spoke with elizabeth Ruiz. Reported patient lives in ranch style home with daughter grandson. 3 step entry. Reported patient is ambulatory with walker. She assists with meal prep and clean up. Has no current services. Would be open to [facility] or SNF pending therapy recs. Following for possible IVATBX at discharge.  Dillon Andrade RN      ECOC on chart for MD signature

## 2020-10-08 NOTE — PROGRESS NOTES
Pt refuse to eat dinner, appetite poor, soft food. Pt said she is afraid to eat and choke. Pt incontinent of urine, turned and changed frequently, C/O of feeling cold, afebrile, wrapped in blankets, SR 2/4  bedcheck on, AVASYS on justa light w/in reach Will monitor.

## 2020-10-08 NOTE — PROGRESS NOTES
Physical Therapy    Facility/Department: [facility] C3 TELE/MED SURG/ONC  Initial Assessment and treatment    NAME: Spencer Alvarez  : 1945  MRN: [id number]    Date of Service: 10/8/2020    Discharge Recommendations:  Continue to assess pending progress   PT Equipment Recommendations  Equipment Needed: (CTA)    Assessment   Body structures, Functions, Activity limitations: Decreased functional mobility ; Decreased cognition  Assessment: Pt referred for PT evaluation during current hospital stay with a diagnosis of acute UTI. Pt currently at [facility] for some bed mobility and able to move LE's independently. Pt at this time refusing EOB or OOB activity, and would benefit from skilled acute PT to assess mobility more thoroughly and to address deficits. CTA for DC recommendations pending progress. Treatment Diagnosis: decreased mobility  Prognosis: Good  Decision Making: Medium Complexity  PT Education: Goals;PT Role;Disease Specific Education  Patient Education: educated pt re importance of mobility in acute setting; pt refused teaching and needs reinforcement  Barriers to Learning: agitation, cognition  REQUIRES PT FOLLOW UP: Yes  Activity Tolerance  Activity Tolerance: Patient limited by cognitive status;Treatment limited secondary to agitation       Patient Diagnosis(es): The primary encounter diagnosis was Altered mental status, unspecified altered mental status type. Diagnoses of Acute renal failure, unspecified acute renal failure type ([address] and Acute cystitis without hematuria were also pertinent to this visit. has a past medical history of Allergic, Anemia, Atrial fibrillation ([address] Breast cancer [facility]), Cardiac pacemaker in situ, Cellulitis, Diffuse pneumonia, Hypertension, Murmur, Osteoarthritis, and Sinus drainage. has a past surgical history that includes Mastectomy (Left); Total knee arthroplasty; and hernia repair.     Restrictions  Restrictions/Precautions  Restrictions/Precautions: Fall Risk  Implants present? : Pacemaker  Position Activity Restriction  Other position/activity restrictions: Avasys, up with assist  Vision/Hearing  Vision: Within Functional Limits  Hearing: Within functional limits     Subjective  General  Chart Reviewed: Yes  Patient assessed for rehabilitation services?: Yes  Response To Previous Treatment: Not applicable  Family / Caregiver Present: No  Referring Practitioner: Laron Vizcaino DO  Referral Date : 10/07/20  Diagnosis: acute UTI  Follows Commands: Impaired(slow processing, refusing to follow commands/requests)  General Comment  Comments: Pt resting in bed upon entry, RN cleared pt for therapy  Subjective  Subjective: Pt initially agreeable to PT, refusing to participate after social functional info gathered, performed minimal mobility  Pain Screening  Patient Currently in Pain: Denies  Vital Signs  Patient Currently in Pain: Denies  Pre Treatment Pain Screening  Intervention List: Patient able to continue with treatment    Orientation  Orientation  Overall Orientation Status: Impaired  Orientation Level: Disoriented to time;Oriented to place; Disoriented to situation;Oriented to person  Social/Functional History  Social/Functional History  Lives With: Daughter  Type of Home: House  Home Layout: One level  Home Access: Stairs to enter without rails  Entrance Stairs - Number of Steps: 2  Home Equipment: Cane(Pt has a walker but cannot remember if it has wheels.)  Additional Comments: Pt unable to provide much information regarding prior level of function. Information about home setting provided from past OT note 5/2019.          Objective          AROM RLE (degrees)  RLE AROM: WFL  AROM LLE (degrees)  LLE AROM : WFL  Strength RLE  Strength RLE: WFL  Comment: grossly 4/5 throughout  Strength LLE  Strength LLE: WFL  Comment: grossly 4/5 throughout        Bed mobility  Rolling to Left: Unable to assess  Transfers  Sit to Stand: Unable to assess  Comment: declining OOB activity  Ambulation  Ambulation?: No        Exercises  Ankle Pumps: x 10 B     Plan   Plan  Times per week: 3-5  Current Treatment Recommendations: Strengthening, Neuromuscular Re-education, Home Exercise Program, ROM, Balance Training, Endurance Training, Functional Mobility Training, Transfer Training, Gait Training  Safety Devices  Type of devices: All fall risk precautions in place, Call light within reach, Bed alarm in place, Patient at risk for falls, Left in bed, Nurse notified                                                             AM-PAC Score  AM-PAC Inpatient Mobility Raw Score : 14 (10/08/20 1247)  AM-PAC Inpatient T-Scale Score : 38.1 (10/08/20 1247)  Mobility Inpatient CMS 0-100% Score: 61.29 (10/08/20 1247)  Mobility Inpatient CMS G-Code Modifier : CL (10/08/20 1247)          Goals  Short term goals  Time Frame for Short term goals: 10/12/20 unless noted  Short term goal 1: Pt will perform bed moiblity with SBA by 10/11/20  Short term goal 2: Pt will perform transfers with CGA  Short term goal 3: Pt will ambulate 50 ft with RW and CGA  Patient Goals   Patient goals : \"to go home\"       Therapy Time   Individual Concurrent Group Co-treatment   Time In 1051         Time Out 1116         Minutes 25         Timed Code Treatment Minutes: 15 Minutes    If pt is discharged prior to next therapy session, this note will serve as discharge summary.     Jesica Acosta, PT

## 2020-10-08 NOTE — PROGRESS NOTES
RESPIRATORY THERAPY ASSESSMENT    Name:  Romel [address] Record Number:  [id number]  Age: 66 y.o. Gender: female  : 1945  Today's Date:  10/7/2020  Room:  2876/3539-94    Assessment     Is the patient being admitted for a COPD or Asthma exacerbation? No   (If yes the patient will be seen every 4 hours for the first 24 hours and then reassessed)    Patient Admission Diagnosis      Allergies  Allergies   Allergen Reactions    Amoxicillin-Pot Clavulanate     Betadine [Povidone Iodine]     Cipro Xr     Morphine     Phenergan [Promethazine Hcl]     Promethazine     Sulfamethoxazole-Trimethoprim     Sulfamethoxazole-Trimethoprim     Tape [Adhesive Tape] Itching    Cephalexin Rash    Codeine Nausea And Vomiting    Prochlorperazine Edisylate Anxiety       Minimum Predicted Vital Capacity:     N/A          Actual Vital Capacity:       N/A            Pulmonary History:No history  Home Oxygen Therapy:  room air  Home Respiratory Therapy:Albuterol   Current Respiratory Therapy:  Albuterol HHN PRN          Respiratory Severity Index(RSI)   Patients with orders for inhalation medications, oxygen, or any therapeutic treatment modality will be placed on Respiratory Protocol. They will be assessed with the first treatment and at least every 72 hours thereafter. The following severity scale will be used to determine frequency of treatment intervention.     Smoking History: Smoking History Less than 1ppd or less than 15 pack year = 1    Social History  Social History     Tobacco Use    Smoking status: Former Smoker     Types: Cigarettes     Last attempt to quit: 1994     Years since quittin.4    Smokeless tobacco: Never Used    Tobacco comment: 5 cigarettes per day for 3-4 years   Substance Use Topics    Alcohol use: No     Alcohol/week: 0.0 standard drinks    Drug use: No       Recent Surgical History: None = 0  Past Surgical History  Past Surgical History:   Procedure Laterality Date  HERNIA REPAIR      MASTECTOMY Left     TOTAL KNEE ARTHROPLASTY      bilateral.       Level of Consciousness: Alert, Follows Commands but Disoriented = 1    Level of Activity: Mostly sedentary, minimal walking = 2    Respiratory Pattern: Regular Pattern; RR 8-20 = 0    Breath Sounds: Diminshed bilaterally and/or crackles = 2    Sputum   ,  ,    Cough: Strong, spontaneous, non-productive = 0    Vital Signs   BP (!) 107/54   Pulse 59   Temp 98.4 °F (36.9 °C) (Oral)   Resp 18   Ht 5' 4\" (1.626 m)   Wt 155 lb (70.3 kg)   SpO2 97%   BMI 26.61 kg/m²   SPO2 (COPD values may differ): Greater than or equal to 92% on room air = 0    Peak Flow (asthma only): not applicable = 0    RSI: 5-6 = Q4hr PRN (every four hours as needed) for dyspnea        Plan       Goals: medication delivery, mobilize retained secretions, volume expansion and improve oxygenation    Patient/caregiver was educated on the proper method of use for Respiratory Care Devices:  Yes      Level of patient/caregiver understanding able to:   ? Verbalize understanding   ? Demonstrate understanding       ? Teach back        ? Needs reinforcement       ? No available caregiver               ? Other:     Response to education:  Good     Is patient being placed on Home Treatment Regimen? Yes     Does the patient have everything they need prior to discharge? NA     Comments: Evaluated pt and reviewed chart. Plan of Care: Albuterol HHN PRN    Electronically signed by Tremayne Rosenberg RCP on 10/7/2020 at 11:25 PM    Respiratory Protocol Guidelines     1. Assessment and treatment by Respiratory Therapy will be initiated for medication and therapeutic interventions upon initiation of aerosolized medication. 2. Physician will be contacted for respiratory rate (RR) greater than 35 breaths per minute. Therapy will be held for heart rate (HR) greater than 140 beats per minute, pending direction from physician.   3. Bronchodilators will be administered via Metered Dose Inhaler (MDI) with spacer when the following criteria are met:  a. Alert and cooperative     b. HR < 140 bpm  c. RR < 30 bpm                d. Can demonstrate a 2-3 second inspiratory hold  4. Bronchodilators will be administered via Hand Held Nebulizer [facility]) to patients when ANY of the following criteria are met  a. Incognizant or uncooperative          b. Patients treated with HHN at Home        c. Unable to demonstrate proper use of MDI with spacer     d. RR > 30 bpm   5. Bronchodilators will be delivered via Metered Dose Inhaler (MDI), HHN, Aerogen to intubated patients on mechanical ventilation. 6. Inhalation medication orders will be delivered and/or substituted as outlined below. Aerosolized Medications Ordering and Administration Guidelines:    1. All Medications will be ordered by a physician, and their frequency and/or modality will be adjusted as defined by the patients Respiratory Severity Index (RSI) score. 2. If the patient does not have documented COPD, consider discontinuing anticholinergics when RSI is less than 9.  3. If the bronchospasm worsens (increased RSI), then the bronchodilator frequency can be increased to a maximum of every 4 hours. If greater than every 4 hours is required, the physician will be contacted. 4. If the bronchospasm improves, the frequency of the bronchodilator can be decreased, based on the patient's RSI, but not less than home treatment regimen frequency. 5. Bronchodilator(s) will be discontinued if patient has a RSI less than 9 and has received no scheduled or as needed treatment for 72  Hrs. Patients Ordered on a Mucolytic Agent:    1. Must always be administered with a bronchodilator. 2. Discontinue if patient experiences worsened bronchospasm, or secretions have lessened to the point that the patient is able to clear them with a cough. Anti-inflammatory and Combination Medications:    1.  If the patient lacks prior history of lung disease, is not using inhaled anti-inflammatory medication at home, and lacks wheezing by examination or by history for at least 24 hours, contact physician for possible discontinuation.

## 2020-10-08 NOTE — PROGRESS NOTES
participate in OOB activity as evidenced by spontaneously reaching for covers at bottom of bed. She declined any attempts at OOB activity.         Cognition  Overall Cognitive Status: Exceptions  Following Commands: Inconsistently follows commands  Attention Span: Unable to maintain attention  Memory: Decreased recall of recent events;Decreased short term memory;Decreased long term memory;Decreased recall of biographical Information  Safety Judgement: Decreased awareness of need for assistance;Decreased awareness of need for safety  Problem Solving: Assistance required to generate solutions;Assistance required to implement solutions;Assistance required to correct errors made;Assistance required to identify errors made;Decreased awareness of errors  Insights: Not aware of deficits  Initiation: Requires cues for all  Sequencing: Requires cues for all  Cognition Comment: Confused, agitated behavior      LUE AROM (degrees)  LUE AROM : WFL  RUE AROM (degrees)  RUE AROM : WFL  LUE Strength  LUE Strength Comment: FOSTER d/t cognition  RUE Strength  RUE Strength Comment: FOSTER d/t cognition       Plan   Plan  Times per week: 2-3x  Current Treatment Recommendations: Self-Care / ADL, Functional Mobility Training, Balance Training, Endurance Training, Safety Education & Training    AM-PAC Score   -[facility] Inpatient Daily Activity Raw Score: 11 (10/08/20 1208)  AM-PAC Inpatient ADL T-Scale Score : 29.04 (10/08/20 1208)  ADL Inpatient CMS 0-100% Score: 70.42 (10/08/20 1208)  ADL Inpatient CMS G-Code Modifier : CL (10/08/20 1208)    Goals  Short term goals  Time Frame for Short term goals: 1 week (10/15) unless noted  Short term goal 1: Sit EOB x3 min with CGA in prep to transfer by 10/12  Short term goal 2: Perform functional transfer with min A with AD  Short term goal 3: Perform 2 grooming tasks with SBA  Short term goal 4: Perform UE exer 15x each to improve endurance  Patient Goals   Patient goals : Pt did not state     Therapy Time   Individual Concurrent Group Co-treatment   Time In 1049         Time Out 1115         Minutes 26         Timed Code Treatment Minutes: 16 Minutes(10 min eval)    If pt is discharged prior to next OT session, this note will serve as the discharge summary.   Eva Wilson OT

## 2020-10-08 NOTE — PROGRESS NOTES
Inpatient Family Medicine Progress Note      PCP: Heri Miller MD    Date of Admission: 10/7/2020    Chief Complaint: [address] Course:  66 y.o. female who presented to [facility] with AMS. Patient is unable to give much information, but son was available to give story. Patient was showing fatigue and AMS. Also was eating less food. Went to doctor on 9/29 and was found to have PATITO. Wasn't able to give a urine at that visit. Mental status continued to deteriorate over the past few days and was unable to take care of herself. Has abdominal pain and some back pain. Also fatigue. No fever/chills. No chest pain. A little shortness of breath. No N/V/D. Is unsure of if there was any pain with urination or blood in her urine. Keeps say \"I don't know\" and asks \"Can I move? \"     While in the ED, patient was consistently uncomfortable on her bed. Labwork showed significant PATITO with Creatitine of 3.8 and GFR of 11. Mild AST, bilirubin, lipase elevation. Elevated WBC of 11.8.  UA significant for likely UTI. No acute changes on head CT. Abdominal CT showed gallstones but no other acute findings. Because of patient's intolerance to multiple medications int he past, ED consulted pharmacy and decided to start Aztreonam to treat UTI. Patient admitted to hospital for further care. Aztreonam decreased to 250mg BID due to low GFR. PT/OT ordered. Subjective:  Pt seen at bedside. Pt alert but still appears disoriented. She kept saying \"I have to get up\". I explained to her she was in the hospital and she needs to lie in the bed and she said \"I know that\". Pt denies CP, SOB, dizziness.       Medications:  Reviewed    Infusion Medications    sodium chloride 50 mL/hr at 10/07/20 2342     Scheduled Medications    aspirin  81 mg Oral Daily    dilTIAZem  120 mg Oral Daily    pantoprazole  40 mg Oral QAM AC    fluticasone  1 spray Nasal Daily    rivaroxaban  20 mg Oral Daily    rOPINIRole  0.5 mg Oral Daily    lisinopril  10 mg Oral Daily    sodium chloride flush  10 mL Intravenous 2 times per day    aztreonam  250 mg Intravenous Q8H     PRN Meds: acetaminophen, meclizine, sodium chloride flush, acetaminophen **OR** acetaminophen, polyethylene glycol, ondansetron, albuterol      Intake/Output Summary (Last 24 hours) at 10/8/2020 0710  Last data filed at 10/8/2020 0455  Gross per 24 hour   Intake 1253 ml   Output 200 ml   Net 1053 ml       Physical Exam Performed:    BP (!) 100/47   Pulse 60   Temp 97.4 °F (36.3 °C) (Oral)   Resp 18   Ht 5' 4\" (1.626 m)   Wt 149 lb 0.5 oz (67.6 kg)   SpO2 96%   BMI 25.58 kg/m²     General appearance:  Pt appeared in distress, attempting to get out of bed. Says she can reach her food. HEENT:  Normal cephalic, atraumatic without obvious deformity. Neck: Supple. No jugular venous distention. Trachea midline. Respiratory:  Normal respiratory effort. CTABL, without Rales/Wheezes/Rhonchi. Cardiovascular:  Regular rate and rhythm with normal S1/S2. Abdomen: +BS, non-distended, soft, suprapubic tenderness. Right CVA tenderness  Musculoskeletal:  No clubbing, cyanosis or edema bilaterally. Skin: Warm and dry  Neurologic:  No focal deficits, no gross sensory,motor deficits  Psychiatric:  Alert but not oriented. Pt says she knows she is in the hospital.  Capillary Refill: Brisk,< 3 seconds   Peripheral Pulses: +2 palpable, equal bilaterally       Labs:   Recent Labs     10/07/20  1615 10/08/20  0559   WBC 11.8* 8.9   HGB 14.6 12.1   HCT 45.8 38.6    189     Recent Labs     10/07/20  1615 10/08/20  0559    137   K 4.3 3.6   CL 97* 104   CO2 20* 19*   BUN 63* 52*   CREATININE 3.8* 2.8*   CALCIUM 10.6 8.9     Recent Labs     10/07/20  1615 10/08/20  0559   AST 54* 37   ALT 23 11   BILITOT 1.4* 0.9   ALKPHOS 64 47     No results for input(s): INR in the last 72 hours. No results for input(s): Winford Montague in the last 72 hours.     Urinalysis:      Lab Results Component Value Date    NITRU Negative 10/07/2020    WBCUA >100 10/07/2020    BACTERIA 4+ 10/07/2020    RBCUA see below 10/07/2020    BLOODU SMALL 10/07/2020    SPECGRAV 1.025 10/07/2020    GLUCOSEU Negative 10/07/2020    GLUCOSEU NEGATIVE 04/24/2012       Radiology:  CT HEAD WO CONTRAST   Final Result   No acute intracranial abnormality. Atrophy more pronounced in the posterior fossa, unchanged. CT ABDOMEN PELVIS WO CONTRAST Additional Contrast? None   Final Result   No acute findings. Gallstones. XR CHEST PORTABLE   Final Result   Negative portable chest x-ray. Assessment/Plan:    Active Hospital Problems    Diagnosis Date Noted    Acute UTI [N39.0] 10/07/2020    Sepsis ([facility] [address] [address] [A41.9] 10/07/2020    Hypotension [I95.9] 10/07/2020    Altered mental status [R41.82] 10/07/2020    Acute kidney injury ([facility] [address] [N17.9] 05/10/2016     1. Acute UTI  - Due to noted allergies to other medication, patient currently on Aztreonam.  - Because of PATITO, patient on half dose of Aztreonam (250mg TID) and can increase to full dose if GFR increases to >30.  - Urine culture pending.  - Blood culture pending.  - Labwork 10/8 including CBC, CMP, Lactic Acid.     2. Sepsis  - Likely due to UTI. - 1000 mL bolus NS given in ED.  - 500 mg Aztreonam given in ED.  - Monitor vitals and labs during visit. - Currently on 50 mL/hr of NS.  - Watch for signs of fluid overload due to PATITO. -Aztreonam decreased to 250mg BID due to low GFR  -Will have to figure OP game plan for abx.     3. Acute Kidney Injury  - Creatinine 3.8, GFR 11>16  - Possibly due to UTI. - Will watch for signs of fluid overload and electrolyte imbalance during the admission.  - May improve with treatment of UTI, but if not, may consider nephrology consult. - Holding home Torsemide 20mg. - Try to avoid medications that can harm kidneys.     4.  Hypotension  - Patient has had low BP since admission.  - Had BP of 71/46 on admission to floor in both arms. - 500 mg bolus NS given. - Most recent BP now 90/51  - Watch for signs of worsening sepsis and fluid overload.     5. Altered Mental Status  - Normally alert and oriented according to son. - May be due to sepsis/UTI. - Will continue to assess to look for improvement.  - PT/OT evaluation pending. DVT Prophylaxis: Xarelto  Diet: DIET RENAL;  Code Status: Full Code  PT/OT Eval Status: Pending    Dispo - Inpatient    This patient was seen and evaluated with resident team and attending, Dr. Richard Bright.     [facility],   Family Medicine Resident PGY-1

## 2020-10-08 NOTE — PROGRESS NOTES
4 Eyes Skin Assessment     The patient is being assess for  admission    I agree that 2 RN's have performed a thorough Head to Toe Skin Assessment on the patient. ALL assessment sites listed below have been assessed.        Areas assessed by both nurses: ***  [x]   Head, Face, and Ears   [x]   Shoulders, Back, and Chest  [x]   Arms, Elbows, and Hands   [x]   Coccyx, Sacrum, and Ischum  [x]   Legs, Feet, and Heels        Does the Patient have Skin Breakdown?  yes         Mariano Prevention initiated:  Yes  Wound Care Orders initiated: no  [facility] nurse consulted for Pressure Injury (Stage 3,4, Unstageable, DTI, NWPT, and Complex wounds):  No    Callus right foot x3 left foot x1  Under right breast red non blanchable        Nurse 1 eSignature: Electronically signed by Kathy Landeros RN on 10/8/20 at 1:35 AM EDT    **SHARE this note so that the co-signing nurse is able to place an eSignature**    Nurse 2 eSignature: {Esignature:[id number]}

## 2020-10-09 PROBLEM — R78.81 BACTEREMIA DUE TO GRAM-POSITIVE BACTERIA: Status: ACTIVE | Noted: 2020-10-09

## 2020-10-09 LAB
ORGANISM: ABNORMAL
URINE CULTURE, ROUTINE: ABNORMAL

## 2020-10-09 PROCEDURE — 6360000002 HC RX W HCPCS: Performed by: STUDENT IN AN ORGANIZED HEALTH CARE EDUCATION/TRAINING PROGRAM

## 2020-10-09 PROCEDURE — 2580000003 HC RX 258: Performed by: STUDENT IN AN ORGANIZED HEALTH CARE EDUCATION/TRAINING PROGRAM

## 2020-10-09 PROCEDURE — 99222 1ST HOSP IP/OBS MODERATE 55: CPT | Performed by: INTERNAL MEDICINE

## 2020-10-09 PROCEDURE — 1200000000 HC SEMI PRIVATE

## 2020-10-09 PROCEDURE — 2500000003 HC RX 250 WO HCPCS: Performed by: STUDENT IN AN ORGANIZED HEALTH CARE EDUCATION/TRAINING PROGRAM

## 2020-10-09 RX ORDER — CEFDINIR 300 MG/1
300 CAPSULE ORAL DAILY
Status: DISCONTINUED | OUTPATIENT
Start: 2020-10-09 | End: 2020-10-10

## 2020-10-09 RX ADMIN — MICONAZOLE NITRATE: 20 POWDER TOPICAL at 21:00

## 2020-10-09 RX ADMIN — SODIUM CHLORIDE, PRESERVATIVE FREE 10 ML: 5 INJECTION INTRAVENOUS at 08:26

## 2020-10-09 RX ADMIN — MICONAZOLE NITRATE: 20 POWDER TOPICAL at 08:25

## 2020-10-09 RX ADMIN — AZTREONAM 250 MG: 1 INJECTION, POWDER, LYOPHILIZED, FOR SOLUTION INTRAMUSCULAR; INTRAVENOUS at 04:30

## 2020-10-09 RX ADMIN — VANCOMYCIN HYDROCHLORIDE 1250 MG: 10 INJECTION, POWDER, LYOPHILIZED, FOR SOLUTION INTRAVENOUS at 09:44

## 2020-10-09 RX ADMIN — AZTREONAM 250 MG: 1 INJECTION, POWDER, LYOPHILIZED, FOR SOLUTION INTRAMUSCULAR; INTRAVENOUS at 12:42

## 2020-10-09 NOTE — FLOWSHEET NOTE
Message sent to Dr. Nick Floyd: \"Here with UTI/PATITO. Lab called with positive blood culture in one bottle - gram positive rods. \"

## 2020-10-09 NOTE — PROGRESS NOTES
Inpatient Family Medicine Progress Note      PCP: Shola Choudhury MD    Date of Admission: 10/7/2020    Chief Complaint: [address] Course:   66 y. o. female who presented to [facility] with AMS.  Patient is unable to give much information, but son was available to give story. Patient was showing fatigue and AMS.  Also was eating less food. Went to doctor on 9/29 and was found to have PATITO.  Wasn't able to give a urine at that visit.  Mental status continued to deteriorate over the past few days and was unable to take care of herself.  Has abdominal pain and some back pain.  Also fatigue.  No fever/chills.  No chest pain.  A little shortness of breath.  No N/V/D.  Is unsure of if there was any pain with urination or blood in her urine. Alberta Castañeda say \"I don't know\" and asks \"Can I move? \"     While in the ED, patient was consistently uncomfortable on her bed.  Labwork showed significant PATITO with Creatitine of 3.8 and GFR of 11.  Mild AST, bilirubin, lipase elevation.  Elevated WBC of 11.8.  UA significant for likely UTI.  No acute changes on head CT.  Abdominal CT showed gallstones but no other acute findings.  Because of patient's intolerance to multiple medications int he past, ED consulted pharmacy and decided to start Aztreonam to treat UTI.  Patient admitted to hospital for further care. Aztreonam decreased to 250mg BID due to low GFR. PT/OT ordered. Pt looked dry so fluids increased from 50ml/hr to 100ml/hr. One blood culture came back positive for gram positive, so started on Vanc which will be monitored by Pharmacy. ID consulted. Subjective:  Pt seen at bedside. Pt disoriented and saying things like \"dont take my pillow\" and \"help me! \". Pt denies chest pain or SOB. Pt able to say who her PCP is. Pt appears about the same as yesterday.       Medications:  Reviewed    Infusion Medications    sodium chloride 100 mL/hr at 10/08/20 8175     Scheduled Medications    rivaroxaban  15 mg Oral Daily    miconazole   Topical BID    aspirin  81 mg Oral Daily    dilTIAZem  120 mg Oral Daily    pantoprazole  40 mg Oral QAM AC    fluticasone  1 spray Nasal Daily    rOPINIRole  0.5 mg Oral Daily    lisinopril  10 mg Oral Daily    sodium chloride flush  10 mL Intravenous 2 times per day    aztreonam  250 mg Intravenous Q8H     PRN Meds: acetaminophen, meclizine, sodium chloride flush, acetaminophen **OR** acetaminophen, polyethylene glycol, ondansetron, albuterol      Intake/Output Summary (Last 24 hours) at 10/9/2020 0803  Last data filed at 10/9/2020 0333  Gross per 24 hour   Intake 960 ml   Output 250 ml   Net 710 ml       Physical Exam Performed:    BP (!) 101/58   Pulse 60   Temp 97.3 °F (36.3 °C) (Axillary)   Resp 16   Ht 5' 4\" (1.626 m)   Wt 147 lb 14.9 oz (67.1 kg)   SpO2 95%   BMI 25.39 kg/m²     General appearance:  Pt appeared in distress and disoriented  HEENT:  Normal cephalic, atraumatic without obvious deformity. Neck: Supple. No jugular venous distention. Trachea midline. Respiratory:  Normal respiratory effort. CTABL, without Rales/Wheezes/Rhonchi. Cardiovascular:  Regular rate and rhythm with normal S1/S2. Abdomen: +BS, non-distended, soft, suprapubic tenderness. Right CVA tenderness  Musculoskeletal:  No clubbing, cyanosis or edema bilaterally. Skin: Warm and dry  Neurologic:  No focal deficits, no gross sensory,motor deficits  Psychiatric:  Alert but not oriented.  Pt says she knows she is in the hospital.  Capillary Refill: Brisk,< 3 seconds   Peripheral Pulses: +2 palpable, equal bilaterally       Labs:   Recent Labs     10/07/20  1615 10/08/20  0559   WBC 11.8* 8.9   HGB 14.6 12.1   HCT 45.8 38.6    189     Recent Labs     10/07/20  1615 10/08/20  0559    137   K 4.3 3.6   CL 97* 104   CO2 20* 19*   BUN 63* 52*   CREATININE 3.8* 2.8*   CALCIUM 10.6 8.9     Recent Labs     10/07/20  1615 10/08/20  0559   AST 54* 37   ALT 23 11   BILITOT 1.4* 0.9   ALKPHOS 64 47 consult. - Holding home Torsemide 20mg. - Try to avoid medications that can harm kidneys.     4. Hypotension  - Patient has had low BP since admission.  - Had BP of 71/46 on admission to floor in both arms. - 500 mg bolus NS given. - Most recent BP now 113/56  - Watch for signs of worsening sepsis and fluid overload.     5. Altered Mental Status  - Normally alert and oriented according to son. - May be due to sepsis/UTI. - Will continue to assess to look for improvement.  - PT/OT evaluation pending. DVT Prophylaxis: Xarelto  Diet: DIET RENAL; Dental Soft  Code Status: Full Code  PT/OT Eval Status: In progress    Dispo - In Patient    This patient was seen and evaluated with resident team and attending, Dr. Sekou Adair.     Bailey Butterfield DO  Family Medicine Resident PGY-1

## 2020-10-09 NOTE — PLAN OF CARE
Nutrition Problem #1: Inadequate oral intake  Intervention: Food and/or Nutrient Delivery: Continue Current Diet, Start Oral Nutrition Supplement  Nutritional Goals: Consume >75% of meals and ONS

## 2020-10-09 NOTE — PROGRESS NOTES
Inpatient Family Medicine Short Progress Note (Chart Update)      Subjective: Saw patient in their hospital room. No major changes from when I last saw the patient. The patient still having issues with getting comfortable and still has some altered mental status. However, there was no noted decline. Physical Exam Performed: Vital signs stable during interview. BP (!) 88/48 Comment: MD aware  Pulse 61   Temp 97.7 °F (36.5 °C) (Oral)   Resp 16   Ht 5' 4\" (1.626 m)   Wt 149 lb 0.5 oz (67.6 kg)   SpO2 98%   BMI 25.58 kg/m²     General appearance: No apparent distress, appears stated age and cooperative. Assessment/Plan: See Dr. Marleen Griffith note for full assessment and plan.       (Please note that portions of this note were completed with a voice recognition program.  Efforts were made to edit the dictations but occasionally words are mis-transcribed.)    Victory Phoenix, DO  10/8/2020  Family Medicine Resident PGY-2  Available via [facility]

## 2020-10-09 NOTE — CONSULTS
Pharmacy to Dose Vancomycin    Dx: Possible Bacteremia, Gram positives in one blood culture   Goal trough = 15 mcg/mL  Goal -600  Pt wt = 67.1 kg (total body weight)  Estimated Creatinine Clearance: 16 mL/min (A) (based on SCr of 2.8 mg/dL (H)). Will pulse dose based off of changing renal function  Vancomycin 1250 mg IVPB x 1 today at 0900  Vancomycin random level tomorrow AM  Glenys Cavanaugh PharmD 10/9/2020 8:42 AM    1010 0555  Vanc random - 8.5 mcg /ml  Reordered vanc 1250 mg x1  Vanc random 10/11 am due to extreme changing renal fxn  Clin calc recommendation:  1250 q24h   AUC/ROBIN - 466  Peak 30.2  Trough - 11.5    Vancomycin Day 4  Pulse dose  Vancomycin random level = 16.1 mcg/mL at 0635  Estimated Creatinine Clearance: 57 mL/min (based on SCr of 0.8 mg/dL).   Start Vancomycin 1250mg IVPB q24h at 1600  Vancomycin trough ordered prior to 4th dose (10/14 at 1530)  Amy Lima 10/12/2020 8:06 AM

## 2020-10-09 NOTE — PROGRESS NOTES
Physician Progress Note      PATIENTIgnacia Aid  [facility] #:                  [id number]  :                       1945  ADMIT DATE:       10/7/2020 4:00 PM  [address] [place] DATE:  RESPONDING  PROVIDER #:        Amy Duran DO          QUERY TEXT:    Dear attending provider,    Patient admitted with PATITO and possible UTI. Noted documentation of sepsis in   10/7 H&P and 10/8 PN. Not meeting SIRS criteria on admission for sepsis. Per   ED:  WBC 11.8, Temp: 97.4 (36.4C) Pulse: 79, Resp: 20.  Per H&P: WBC 11.8, Temp: 98.4, Pulse: 59, Resp: 18. Please indicate one of the   following and document in the medical record: The medical record reflects the following: Per H&P: Not ill-appearing, not in   acute distress  Risk Factors: Possible UTI  Clinical Indicators: None to support sepsis on arrival  Treatment: IVF  Options provided:  -- Sepsis was ruled out after study  -- Sepsis present as evidenced by, Please document evidence. -- Other - I will add my own diagnosis  -- Disagree - Not applicable / Not valid  -- Disagree - Clinically unable to determine / Unknown  -- Refer to Clinical Documentation Reviewer    PROVIDER RESPONSE TEXT:    Sepsis is present as evidenced by Possibly. Blood culture Positive X 1 bottle. confusion which is new. chills. Elevated WBC.     Query created by: Hugh Taylor on 10/8/2020 3:23 PM      Electronically signed by:  Amy Duran DO 10/9/2020 3:16 PM

## 2020-10-09 NOTE — FLOWSHEET NOTE
Pt alert to self, confused. VSS. Shift assessment completed. Pt turned Q 2 hrs with pillow support. Checked and changed as needed. Denies pain or other needs. Call light within reach, bed in lowest position, wheels locked. Bed alarm on and audible. AVASYS in place. Will monitor.

## 2020-10-09 NOTE — CONSULTS
Infectious Diseases   Consult Note      Reason for Consult:  UTI, positive BC    Requesting Physician:  Ivelisse Allred DO       Date of Admission: 10/7/2020  Subjective:   CHIEF COMPLAINT:  None given       HPI:    Karthikeyan King is a 72yoF with history of afib, HTN, PPM in place, breast cancer s/p mastectomy, zahida TKA                 On or around 9/22/20, her adult children noted she was weak, \"dizzy,\" poor po intake. Saw PCP 9/29/20, had labs done showing PATITO and was referred to Nephrology. Brought her into ED 10/7/20 as she seemed to be declining further. Labs notable for PATITO, WBC was 11.8. CT abd negative  CXR clear   CT head negative  UA neg nitrite, mod LE, >100 wbc on microscopy  Admitted with working diagnosis of UTI and started on aztreonam.     She has been consistently afebrile and is on room air    One of 2 sets of BC collected on admission positive with GPR  UC with sensitive E coli    Patient onlt says \"I'm cold. \"  Denies pain. Po intake remains poor. Current abx:  vanc per pharmacy, started 10/9/20   Aztreonam 250mg IV q8        Past Surgical History:       Diagnosis Date    Allergic     Anemia     Atrial fibrillation ([address]     Breast cancer ([address]     s/p left mastectomy 2001    Cardiac pacemaker in situ 10/1/2015 left    AV node ablation and Bi-V PPM ([employer])    Cellulitis     right leg.  Diffuse pneumonia 5/9/2019    Hypertension     Murmur     Osteoarthritis     Sinus drainage          Procedure Laterality Date    HERNIA REPAIR      MASTECTOMY Left     TOTAL KNEE ARTHROPLASTY      bilateral.       Social History:    TOBACCO:   reports that she quit smoking about 26 years ago. Her smoking use included cigarettes. She has never used smokeless tobacco.  ETOH:   reports no history of alcohol use. There is no history of illicit drug use or other significant epidemiologic exposures.   Lives with her daughter      Family History:       Problem Relation Age of Onset    Arthritis Paternal Grandfather     Heart Disease Mother     High Blood Pressure Mother     Heart Disease Father     High Blood Pressure Father        Current Medications:    Current Facility-Administered Medications: vancomycin (VANCOCIN) intermittent dosing (placeholder), , Other, RX Placeholder  rivaroxaban (XARELTO) tablet 15 mg, 15 mg, Oral, Daily  miconazole (MICOTIN) 2 % powder, , Topical, BID  acetaminophen (TYLENOL) tablet 650 mg, 650 mg, Oral, Q4H PRN  aspirin chewable tablet 81 mg, 81 mg, Oral, Daily  dilTIAZem (CARDIZEM CD) extended release capsule 120 mg, 120 mg, Oral, Daily  pantoprazole (PROTONIX) tablet 40 mg, 40 mg, Oral, QAM AC  fluticasone (FLONASE) 50 MCG/ACT nasal spray 1 spray, 1 spray, Nasal, Daily  rOPINIRole (REQUIP) tablet 0.5 mg, 0.5 mg, Oral, Daily  meclizine (ANTIVERT) tablet 25 mg, 25 mg, Oral, Daily PRN  lisinopril (PRINIVIL;ZESTRIL) tablet 10 mg, 10 mg, Oral, Daily  sodium chloride flush 0.9 % injection 10 mL, 10 mL, Intravenous, 2 times per day  sodium chloride flush 0.9 % injection 10 mL, 10 mL, Intravenous, PRN  acetaminophen (TYLENOL) tablet 650 mg, 650 mg, Oral, Q6H PRN **OR** acetaminophen (TYLENOL) suppository 650 mg, 650 mg, Rectal, Q6H PRN  polyethylene glycol (GLYCOLAX) packet 17 g, 17 g, Oral, Daily PRN  0.9 % sodium chloride infusion, , Intravenous, Continuous  aztreonam (AZACTAM) 250 mg in dextrose 5 % 50 mL IVPB, 250 mg, Intravenous, Q8H  ondansetron (ZOFRAN-ODT) disintegrating tablet 4 mg, 4 mg, Oral, Q6H PRN  albuterol (PROVENTIL) nebulizer solution 2.5 mg, 2.5 mg, Nebulization, Q4H PRN      Allergies   Allergen Reactions    Amoxicillin-Pot Clavulanate     Betadine [Povidone Iodine]     Cipro Xr     Morphine     Phenergan [Promethazine Hcl]     Promethazine     Sulfamethoxazole-Trimethoprim     Sulfamethoxazole-Trimethoprim     Tape [Adhesive Tape] Itching    Cephalexin Rash    Codeine Nausea And Vomiting    Prochlorperazine Edisylate Anxiety Assessment:     Patient Active Problem List   Diagnosis    Colitis    Hypokalemia    Near syncope    Essential hypertension    Vertigo    Dizziness    CHF exacerbation (HCC)    Chronic diastolic congestive heart failure (HCC)    Chronic atrial fibrillation (HCC)    Pacemaker    Anticoagulation goal of INR 2 to 3    Physical debility    History of breast cancer    Acute kidney injury ([address]    Abdominal pain    Chest pain    Complete heart block ([address]    Diffuse pneumonia    Acute UTI    Sepsis ([address]    Hypotension    Altered mental status       Jose D Ramirez is a 72yoF who is evaluated for the following:    Admitted 10/7/20 with 2 week history weakness, anorexia, lethargy, poor po intake    E coli UTI, sensitive organism    Positive BC, 1/2 sets collected on admission with GPR (Corynebactria/diptheroids, Listeria, Clostridia, Nocardia, Actinomycetes, others)  GPR in [facility] usually prove to be skin contaminants. She does have PPM in place and is at higher risk of serious endovascular infection however. Lacks signs of CNS infection to invoke Listeria meningitis. PATITO - resolving     Listed allergy Augmentin, cipro, sulfa, cephalexin (rash)  She has received several course oral and parenteral cephalosporins in the past       Recs: For E coli UTI, change to oral cephalosporin dose reduced for PATITO and monitor for any s/s drug reaction. Continue IV Vanc pending ID of the GPR in [facility]   Would not pursue echo or additional workup until the ID of the organism and its significance is known    Continue supportive care    D/w pt's son at bedside, all questions answered        Santo Dan M.D. Thank you for the opportunity to participate in the care of your patient.     Please do not hesitate to contact me:   [phone] office  [phone] mobile

## 2020-10-09 NOTE — PROGRESS NOTES
Comprehensive Nutrition Assessment    Type and Reason for Visit:  Initial, Positive Nutrition Screen    Nutrition Recommendations/Plan:   1. Continue current diet  2. Start Magic cup TID  3. Monitor diet tolerance and need for SLP evaluation  4. Monitor nutrition adequacy, pertinent labs, bowel habits, wt changes, and clinical progress    Nutrition Assessment:  Positive screen for poor appetite. Pt admitted with altered mental status and acute UTI. Hx of HTN, CHF, and breast cancer. Pt asleep at time of visit. Spoke with son. Reports pt has had poor appetite for past few weeks. Pt followed general diet at home. Son reports pt lost 10-20# in past 2-3 weeks. At time of visit, noted pt took a couple bites of cookie. Son agreed to start ONS to optimize nutrition. Will monitor for further nutrition intervention. Malnutrition Assessment:  Malnutrition Status: At risk for malnutrition (Comment)    Context:  Acute Illness     Findings of the 6 clinical characteristics of malnutrition:  Energy Intake:  Mild decrease in energy intake (Comment)  Weight Loss:  7 - Greater than 5% over 1 month(per report by son)       Estimated Daily Nutrient Needs:  Energy (kcal):  8402-6334 kcals/day; Weight Used for Energy Requirements:  Ideal(54.54 kg)     Protein (g):  54.54-65 g/day; Weight Used for Protein Requirements:  Ideal(1.0-1.2 g/kg)        Fluid (ml/day):  1 ml/kcal; Weight Used for Fluid Requirements:  [place]      Nutrition Related Findings:  poor appetite      Wounds:  None       Current Nutrition Therapies:    DIET DENTAL SOFT;  Dietary Nutrition Supplements: Frozen Oral Supplement    Anthropometric Measures:  · Height: 5' 4\" (162.6 cm)  · Current Body Weight: 147 lb 14.9 oz (67.1 kg)   · Ideal Body Weight: 120 lbs; % Ideal Body Weight 123.3 %   · BMI: 25.4  · Adjusted Body Weight:   No Adjustment    · BMI Categories: Overweight (BMI 25.0-29. 9)       Nutrition Diagnosis:   · Inadequate oral intake related to early satiety as evidenced by intake 0-25%, poor intake prior to admission      Nutrition Interventions:   Food and/or Nutrient Delivery:  Continue Current Diet, Start Oral Nutrition Supplement  Nutrition Education/Counseling:  No recommendation at this time   Coordination of Nutrition Care:  Continued Inpatient Monitoring    Goals:  Consume >75% of meals and ONS       Nutrition Monitoring and Evaluation:       Food/Nutrient Intake Outcomes:  Food and Nutrient Intake, Supplement Intake  Physical Signs/Symptoms Outcomes:  Biochemical Data, Meal Time Behavior     Discharge Planning:     Too soon to determine     Electronically signed by Elaine Cho, Dietetics student on 10/9/20 at 2:32 PM EDT    Contact: [phone]

## 2020-10-09 NOTE — PROGRESS NOTES
Occupational Therapy/Physical Therapy  Attempted OT/PT tx. Pt adamantly refused. \"No, go away. \"  Pt's son provided encouragement for pt to participate but she continued to refuse. Cont OT/PT.   Epimendafne Barriga OT  Sandra Salinas PT

## 2020-10-09 NOTE — PROGRESS NOTES
Pt is alert and oriented to person and place only. Vitals signs are stable. Assessment is as charted. Pt denies further needs at this time. Will continue to monitor.

## 2020-10-09 NOTE — PLAN OF CARE
Problem: Skin Integrity:  Goal: Absence of new skin breakdown  Description: Absence of new skin breakdown  Outcome: Ongoing     4 Eyes Skin Assessment     The patient is being assess for  Shift Handoff    I agree that 2 RN's have performed a thorough Head to Toe Skin Assessment on the patient. ALL assessment sites listed below have been assessed. Areas assessed by both nurses: Norma Calhoun RN and Edith Marshall RN  [x]   Head, Face, and Ears   [x]   Shoulders, Back, and Chest  [x]   Arms, Elbows, and Hands   [x]   Coccyx, Sacrum, and Ischum  [x]   Legs, Feet, and Heels        Does the Patient have Skin Breakdown?   No         Mariano Prevention initiated:  Yes   Wound Care Orders initiated:  NA      WOC nurse consulted for Pressure Injury (Stage 3,4, Unstageable, DTI, NWPT, and Complex wounds):  NA      Nurse 1 eSignature: Electronically signed by Mauricio Benton RN on 10/9/20 at 3:08 AM EDT    **SHARE this note so that the co-signing nurse is able to place an eSignature**    Nurse 2 eSignature: Electronically signed by Edith Marshall RN on 10/10/20 at 6:39 AM EDT

## 2020-10-10 LAB
ANION GAP SERPL CALCULATED.3IONS-SCNC: 10 MMOL/L (ref 3–16)
BASOPHILS ABSOLUTE: 0.1 K/UL (ref 0–0.2)
BASOPHILS RELATIVE PERCENT: 1.1 %
BLOOD CULTURE, ROUTINE: ABNORMAL
BUN BLDV-MCNC: 17 MG/DL (ref 7–20)
CALCIUM SERPL-MCNC: 8.7 MG/DL (ref 8.3–10.6)
CHLORIDE BLD-SCNC: 108 MMOL/L (ref 99–110)
CO2: 18 MMOL/L (ref 21–32)
CREAT SERPL-MCNC: 0.8 MG/DL (ref 0.6–1.2)
EOSINOPHILS ABSOLUTE: 0.2 K/UL (ref 0–0.6)
EOSINOPHILS RELATIVE PERCENT: 2.8 %
GFR AFRICAN AMERICAN: >60
GFR NON-AFRICAN AMERICAN: >60
GLUCOSE BLD-MCNC: 74 MG/DL (ref 70–99)
HCT VFR BLD CALC: 35.7 % (ref 36–48)
HEMOGLOBIN: 11.5 G/DL (ref 12–16)
LYMPHOCYTES ABSOLUTE: 1.5 K/UL (ref 1–5.1)
LYMPHOCYTES RELATIVE PERCENT: 27 %
MCH RBC QN AUTO: 24.7 PG (ref 26–34)
MCHC RBC AUTO-ENTMCNC: 32.2 G/DL (ref 31–36)
MCV RBC AUTO: 76.6 FL (ref 80–100)
MONOCYTES ABSOLUTE: 0.6 K/UL (ref 0–1.3)
MONOCYTES RELATIVE PERCENT: 9.9 %
NEUTROPHILS ABSOLUTE: 3.3 K/UL (ref 1.7–7.7)
NEUTROPHILS RELATIVE PERCENT: 59.2 %
ORGANISM: ABNORMAL
PDW BLD-RTO: 21.2 % (ref 12.4–15.4)
PLATELET # BLD: 154 K/UL (ref 135–450)
PMV BLD AUTO: 10.2 FL (ref 5–10.5)
POTASSIUM REFLEX MAGNESIUM: 3.9 MMOL/L (ref 3.5–5.1)
RBC # BLD: 4.66 M/UL (ref 4–5.2)
SODIUM BLD-SCNC: 136 MMOL/L (ref 136–145)
VANCOMYCIN RANDOM: 8.5 UG/ML
WBC # BLD: 5.6 K/UL (ref 4–11)

## 2020-10-10 PROCEDURE — 80048 BASIC METABOLIC PNL TOTAL CA: CPT

## 2020-10-10 PROCEDURE — 36415 COLL VENOUS BLD VENIPUNCTURE: CPT

## 2020-10-10 PROCEDURE — 6360000002 HC RX W HCPCS: Performed by: INTERNAL MEDICINE

## 2020-10-10 PROCEDURE — 85025 COMPLETE CBC W/AUTO DIFF WBC: CPT

## 2020-10-10 PROCEDURE — 6360000002 HC RX W HCPCS

## 2020-10-10 PROCEDURE — 2580000003 HC RX 258: Performed by: STUDENT IN AN ORGANIZED HEALTH CARE EDUCATION/TRAINING PROGRAM

## 2020-10-10 PROCEDURE — 1200000000 HC SEMI PRIVATE

## 2020-10-10 PROCEDURE — 2580000003 HC RX 258: Performed by: INTERNAL MEDICINE

## 2020-10-10 PROCEDURE — 80202 ASSAY OF VANCOMYCIN: CPT

## 2020-10-10 RX ORDER — CEFDINIR 300 MG/1
300 CAPSULE ORAL 2 TIMES DAILY
Status: DISCONTINUED | OUTPATIENT
Start: 2020-10-10 | End: 2020-10-10

## 2020-10-10 RX ORDER — PANTOPRAZOLE SODIUM 40 MG/10ML
40 INJECTION, POWDER, LYOPHILIZED, FOR SOLUTION INTRAVENOUS DAILY
Status: DISCONTINUED | OUTPATIENT
Start: 2020-10-11 | End: 2020-10-17 | Stop reason: HOSPADM

## 2020-10-10 RX ADMIN — VANCOMYCIN HYDROCHLORIDE 1250 MG: 10 INJECTION, POWDER, LYOPHILIZED, FOR SOLUTION INTRAVENOUS at 15:46

## 2020-10-10 RX ADMIN — MICONAZOLE NITRATE: 20 POWDER TOPICAL at 22:02

## 2020-10-10 RX ADMIN — SODIUM CHLORIDE: 9 INJECTION, SOLUTION INTRAVENOUS at 22:01

## 2020-10-10 NOTE — PROGRESS NOTES
PS Dr. Nell Barney, \"Pt refusing to let lab draw BMP. Lab has tried 3 times now and she gets very agitated and starts yelling. Tried calming her down and talking her into it and she just keeps repeating she wants to go home and saying they are not touching her.  Thank You.\"

## 2020-10-10 NOTE — PROGRESS NOTES
Received a call from RN. Patient refusing oral Ceftin. Asking for IV options. Allergy profile reviewed. Can change back to IV aztreonam, if patient agreeable. IV ceftriaxone also an option, if patient is not allergic. Looks like she has received several courses of oral and parenteral cephalosporins in the past.    Gram-positive khurram in the blood culture may be a contaminant. Formal identification awaited. Would recommend repeating blood cultures, if patient agreeable. If patient  alert and oriented and refuses to get even IV treatment, will honor her wishes. Discussed with RN.       Arpan Patel MD, MPH  10/10/2020, 9:03 AM  [facility] Infectious Disease   [address] [place] [address] [address] [facility], [address] [place] [address]  Office: [phone]  Fax: [phone]  Clinic days:  Tuesday & Thursday

## 2020-10-10 NOTE — PROGRESS NOTES
Inpatient Family Medicine Progress Note      PCP: Amanda García MD    Date of Admission: 10/7/2020    Chief Complaint: [address] Course:   66 y. o. female who presented to [facility] with AMS.  Patient is unable to give much information, but son was available to give story. Patient was showing fatigue and AMS.  Also was eating less food. Went to doctor on 9/29 and was found to have PATITO.  Wasn't able to give a urine at that visit.  Mental status continued to deteriorate over the past few days and was unable to take care of herself.  Has abdominal pain and some back pain.  Also fatigue.  No fever/chills.  No chest pain.  A little shortness of breath.  No N/V/D.  Is unsure of if there was any pain with urination or blood in her urine. Namn Bonds saying \"I don't know\" and asks \"Can I move? \"     While in the ED, patient was consistently uncomfortable on her bed.  Labwork showed significant PATITO with Creatitine of 3.8 and GFR of 11.  Mild AST, bilirubin, lipase elevation.  Elevated WBC of 11.8.  UA significant for likely UTI.  No acute changes on head CT.  Abdominal CT showed gallstones but no other acute findings.  Because of patient's intolerance to multiple medications int he past, ED consulted pharmacy and decided to start Aztreonam to treat UTI.  Patient admitted to hospital for further care. Aztreonam decreased to 250mg BID due to low GFR. PT/OT ordered. Pt looked dry so fluids increased from 50ml/hr to 100ml/hr. One blood culture came back positive for gram positive rods, so started on Vanc which will be monitored by Pharmacy. ID consulted and switched aztreonam to HDZ SILVIO and continued vanc with instructions to watch for s/s of allergic reaction. Subjective: Patient feels cold and thinks she is wet. Nursing reports Olga Alis in place and multiple blankets on patient.    -Oriented to self only, thinks it is her birthday today. Family reporting oriented 2 weeks ago.   -Refusing PO meds     Medications: 10/07/20  1615 10/08/20  0559 10/10/20  0555    137 136   K 4.3 3.6 3.9   CL 97* 104 108   CO2 20* 19* 18*   BUN 63* 52* 17   CREATININE 3.8* 2.8* 0.8   CALCIUM 10.6 8.9 8.7     Recent Labs     10/07/20  1615 10/08/20  0559   AST 54* 37   ALT 23 11   BILITOT 1.4* 0.9   ALKPHOS 64 47     No results for input(s): INR in the last 72 hours. No results for input(s): [place] in the last 72 hours. Urinalysis:      Lab Results   Component Value Date    NITRU Negative 10/07/2020    WBCUA >100 10/07/2020    BACTERIA 4+ 10/07/2020    RBCUA see below 10/07/2020    BLOODU SMALL 10/07/2020    SPECGRAV 1.025 10/07/2020    GLUCOSEU Negative 10/07/2020    GLUCOSEU NEGATIVE 04/24/2012       Radiology:  CT HEAD WO CONTRAST   Final Result   No acute intracranial abnormality. Atrophy more pronounced in the posterior fossa, unchanged. CT ABDOMEN PELVIS WO CONTRAST Additional Contrast? None   Final Result   No acute findings. Gallstones. XR CHEST PORTABLE   Final Result   Negative portable chest x-ray. Assessment/Plan:    Active Hospital Problems    Diagnosis Date Noted    Bacteremia due to Gram-positive bacteria [R78.81] 10/09/2020    Acute UTI [N39.0] 10/07/2020    Hypotension [I95.9] 10/07/2020    Altered mental status [R41.82] 10/07/2020    Acute kidney injury ([facility] [address] [N17.9] 05/10/2016     1. AMS possibly 2/2 Acute Cystitis and sepsis, but if not responding to abx treatment may need dementia workup.   -Check TSH, iron, b1, b12, folate, rpr, sed rate if no improvement, CT if acute worsening.  - Patient on vanc for gram positive bacteremia and omnicef for UTI, per ID monitor for s/s as developed rash with Keflex. Patient refusing PO at this time. - Urine culture - e coli  - Blood culture positive - gram positive  - Started on Vanc which is managed by pharm.  - ID consulted  - Will need to set up OP IV Aztreonam.     2.  Sepsis with gram positive khurram bacteremia, ID showing Clostridium perfringens, white count responding to vanc. Questionable allergy to cephalosporins and patient refusing PO.   - Infectious disease consult. Abx per ID.   - 1000 mL bolus NS given in ED.  - Monitor vitals and labs during visit. - Currently on 100 mL/hr of NS.  - Watch for signs of fluid overload     3. UTI:  -See AMS    4. Acute Kidney Injury (Resolved)     5. Hypotension resolving with sepsis treatment  - Patient has had low BP since admission.  - Had BP of 71/46 on admission to floor in both arms. - 500 mg bolus NS given. - Most recent BP now 113/56  - Watch for signs of worsening sepsis and fluid overload.     - PT/OT evaluation pending.     DVT Prophylaxis: Xarelto  Diet: DIET DENTAL SOFT;  Dietary Nutrition Supplements: Frozen Oral Supplement  Code Status: Full Code  PT/OT Eval Status: Consulted, D/C recs to follow pending improvement. Pt refusing currently. Dispo - Pending clinical improvement    This patient was seen and evaluated with the resident team and with the attending, Dr. Richard Bright.     (Please note that portions of this note were completed with a voice recognition program.  Efforts were made to edit the dictations but occasionally words are mis-transcribed.)    Jose Espitia DO  Family Medicine Resident PGY2

## 2020-10-10 NOTE — PROGRESS NOTES
Inpatient Family Medicine Short Progress Note (Chart Update)      Subjective: Saw patient in their hospital room. At the time of my interview, the patient had no questions or concerns and did not appear to be in any acute discomfort or distress. However, the patient has apparently been refusing getting labwork or taking oral medications. Physical Exam Performed: Vital signs stable during interview. BP (!) 144/76   Pulse 63   Temp 97.5 °F (36.4 °C) (Oral)   Resp 17   Ht 5' 4\" (1.626 m)   Wt 147 lb 14.9 oz (67.1 kg)   SpO2 98%   BMI 25.39 kg/m²     General appearance: No apparent distress, appears stated age and cooperative. Assessment/Plan:  Continue to watch and monitor patient to make sure that her condition is not getting due to missing oral medications. If she refuses again tomorrow, will need to consider IV options. Will wait to try and get labwork again this morning. Othewise see Dr. Marleen Griffith note for full assessment and plan.         (Please note that portions of this note were completed with a voice recognition program.  Efforts were made to edit the dictations but occasionally words are mis-transcribed.)    Victory Phoenix, DO  10/10/2020  Family Medicine Resident PGY-2  Available via [facility]

## 2020-10-10 NOTE — PROGRESS NOTES
Pt alert, confused. Pt refusing all medications. Pt refusing all food. Pt states she cannot swallow pills. Offered pills in puree, pt screamed and states she cannot and will not take. Attempted to administer subQ lovenox per orders. Pt screamed, held down bedding and gown and swung limbs at this nurse. lovenox not administered at this time. Pt continues to yell she is wet, brief clean and dry with purewick external urinary device working, pt states she is cold, numerous blankets in place and fresh warm blanket applied, pt states she is crooked in bed, will not allow staff to reposition. Food and drink offered multiple times, pt refused. Will continue to monitor. Call light in pee abdullahi monitor for pt safety.

## 2020-10-10 NOTE — PLAN OF CARE
Bed locked and in lowest position with bed check and avasys in place for safety. Bedside table and call light within reach. Room door open and room well lit. Will continue to monitor.

## 2020-10-11 LAB
BASOPHILS ABSOLUTE: 0.1 K/UL (ref 0–0.2)
BASOPHILS RELATIVE PERCENT: 1.2 %
CULTURE, BLOOD 2: NORMAL
EKG ATRIAL RATE: 441 BPM
EKG DIAGNOSIS: NORMAL
EKG Q-T INTERVAL: 464 MS
EKG QRS DURATION: 118 MS
EKG QTC CALCULATION (BAZETT): 464 MS
EKG R AXIS: -44 DEGREES
EKG T AXIS: 65 DEGREES
EKG VENTRICULAR RATE: 60 BPM
EOSINOPHILS ABSOLUTE: 0.1 K/UL (ref 0–0.6)
EOSINOPHILS RELATIVE PERCENT: 1.8 %
FERRITIN: 60.7 NG/ML (ref 15–150)
FOLATE: 10.16 NG/ML (ref 4.78–24.2)
HCT VFR BLD CALC: 35.5 % (ref 36–48)
HEMOGLOBIN: 11.3 G/DL (ref 12–16)
IRON SATURATION: 19 % (ref 15–50)
IRON: 46 UG/DL (ref 37–145)
LYMPHOCYTES ABSOLUTE: 1.4 K/UL (ref 1–5.1)
LYMPHOCYTES RELATIVE PERCENT: 23.1 %
MCH RBC QN AUTO: 24.7 PG (ref 26–34)
MCHC RBC AUTO-ENTMCNC: 31.9 G/DL (ref 31–36)
MCV RBC AUTO: 77.3 FL (ref 80–100)
MONOCYTES ABSOLUTE: 0.5 K/UL (ref 0–1.3)
MONOCYTES RELATIVE PERCENT: 8.9 %
NEUTROPHILS ABSOLUTE: 4 K/UL (ref 1.7–7.7)
NEUTROPHILS RELATIVE PERCENT: 65 %
PDW BLD-RTO: 21 % (ref 12.4–15.4)
PLATELET # BLD: 150 K/UL (ref 135–450)
PMV BLD AUTO: 9.1 FL (ref 5–10.5)
RBC # BLD: 4.59 M/UL (ref 4–5.2)
TOTAL IRON BINDING CAPACITY: 240 UG/DL (ref 260–445)
TROPONIN: 0.03 NG/ML
TROPONIN: 0.04 NG/ML
TSH REFLEX: 1.37 UIU/ML (ref 0.27–4.2)
VANCOMYCIN RANDOM: 14.7 UG/ML
VITAMIN B-12: 1123 PG/ML (ref 211–911)
WBC # BLD: 6.2 K/UL (ref 4–11)

## 2020-10-11 PROCEDURE — 6360000002 HC RX W HCPCS: Performed by: STUDENT IN AN ORGANIZED HEALTH CARE EDUCATION/TRAINING PROGRAM

## 2020-10-11 PROCEDURE — 2500000003 HC RX 250 WO HCPCS: Performed by: STUDENT IN AN ORGANIZED HEALTH CARE EDUCATION/TRAINING PROGRAM

## 2020-10-11 PROCEDURE — 6360000002 HC RX W HCPCS: Performed by: INTERNAL MEDICINE

## 2020-10-11 PROCEDURE — 1200000000 HC SEMI PRIVATE

## 2020-10-11 PROCEDURE — 82728 ASSAY OF FERRITIN: CPT

## 2020-10-11 PROCEDURE — 36415 COLL VENOUS BLD VENIPUNCTURE: CPT

## 2020-10-11 PROCEDURE — 80202 ASSAY OF VANCOMYCIN: CPT

## 2020-10-11 PROCEDURE — 93005 ELECTROCARDIOGRAM TRACING: CPT

## 2020-10-11 PROCEDURE — 83540 ASSAY OF IRON: CPT

## 2020-10-11 PROCEDURE — 93010 ELECTROCARDIOGRAM REPORT: CPT | Performed by: INTERNAL MEDICINE

## 2020-10-11 PROCEDURE — 85025 COMPLETE CBC W/AUTO DIFF WBC: CPT

## 2020-10-11 PROCEDURE — 83550 IRON BINDING TEST: CPT

## 2020-10-11 PROCEDURE — 2580000003 HC RX 258: Performed by: STUDENT IN AN ORGANIZED HEALTH CARE EDUCATION/TRAINING PROGRAM

## 2020-10-11 PROCEDURE — 84484 ASSAY OF TROPONIN QUANT: CPT

## 2020-10-11 PROCEDURE — 84443 ASSAY THYROID STIM HORMONE: CPT

## 2020-10-11 PROCEDURE — C9113 INJ PANTOPRAZOLE SODIUM, VIA: HCPCS | Performed by: STUDENT IN AN ORGANIZED HEALTH CARE EDUCATION/TRAINING PROGRAM

## 2020-10-11 PROCEDURE — 2580000003 HC RX 258: Performed by: INTERNAL MEDICINE

## 2020-10-11 RX ADMIN — MICONAZOLE NITRATE: 20 POWDER TOPICAL at 08:29

## 2020-10-11 RX ADMIN — AZTREONAM 500 MG: 1 INJECTION, POWDER, LYOPHILIZED, FOR SOLUTION INTRAMUSCULAR; INTRAVENOUS at 09:06

## 2020-10-11 RX ADMIN — VANCOMYCIN HYDROCHLORIDE 1250 MG: 10 INJECTION, POWDER, LYOPHILIZED, FOR SOLUTION INTRAVENOUS at 16:48

## 2020-10-11 RX ADMIN — AZTREONAM 500 MG: 1 INJECTION, POWDER, LYOPHILIZED, FOR SOLUTION INTRAMUSCULAR; INTRAVENOUS at 15:52

## 2020-10-11 RX ADMIN — SODIUM CHLORIDE: 9 INJECTION, SOLUTION INTRAVENOUS at 20:17

## 2020-10-11 RX ADMIN — MICONAZOLE NITRATE: 20 POWDER TOPICAL at 20:17

## 2020-10-11 RX ADMIN — AZTREONAM 500 MG: 1 INJECTION, POWDER, LYOPHILIZED, FOR SOLUTION INTRAMUSCULAR; INTRAVENOUS at 23:11

## 2020-10-11 RX ADMIN — PANTOPRAZOLE SODIUM 40 MG: 40 INJECTION, POWDER, FOR SOLUTION INTRAVENOUS at 08:27

## 2020-10-11 RX ADMIN — SODIUM CHLORIDE: 9 INJECTION, SOLUTION INTRAVENOUS at 05:37

## 2020-10-11 RX ADMIN — AZTREONAM 500 MG: 1 INJECTION, POWDER, LYOPHILIZED, FOR SOLUTION INTRAMUSCULAR; INTRAVENOUS at 00:14

## 2020-10-11 RX ADMIN — SODIUM CHLORIDE, PRESERVATIVE FREE 10 ML: 5 INJECTION INTRAVENOUS at 08:29

## 2020-10-11 NOTE — CARE COORDINATION
Chart reviewed day 4. Care managed per cardiology, ID and IM. Patient alert and confused. Difficulty taking medications orally. Currently with IVATBX. Lives in ranch style home with her daughter. Open to [place] [id number] vs SNF. Therapy with CTA recs.  Cesia Urena, RN

## 2020-10-11 NOTE — PROGRESS NOTES
Inpatient Family Medicine Progress Note      PCP: Roslyn Caro MD    Date of Admission: 10/7/2020    [address] Course:   66 y. o. female who presented to [facility] with AMS.  Patient is unable to give much information, but son was available to give story. Patient was showing fatigue and AMS.  Also was eating less food. Went to doctor on 9/29 and was found to have PATITO.  Wasn't able to give a urine at that visit.  Mental status continued to deteriorate over the past few days and was unable to take care of herself.  Has abdominal pain and some back pain.  Also fatigue.  No fever/chills.  No chest pain.  A little shortness of breath.  No N/V/D.  Is unsure of if there was any pain with urination or blood in her urine. Tanmay Garcia saying \"I don't know\" and asks \"Can I move? \"     While in the ED, patient was consistently uncomfortable on her bed.  Labwork showed significant PATITO with Creatitine of 3.8 and GFR of 11.  Mild AST, bilirubin, lipase elevation.  Elevated WBC of 11.8.  UA significant for likely UTI.  No acute changes on head CT.  Abdominal CT showed gallstones but no other acute findings.  Because of patient's intolerance to multiple medications int he past, ED consulted pharmacy and decided to start Aztreonam to treat UTI.  Patient admitted to hospital for further care. Aztreonam decreased to 250mg BID due to low GFR. PT/OT ordered. Pt looked dry so fluids increased from 50ml/hr to 100ml/hr. One blood culture came back positive for gram positive rods, so started on Vanc which will be monitored by Pharmacy. ID consulted. With improvement in renal function patient's aztreonam dose increased to 500mg TID. Subjective: Patient is alert this morning. She continues to say she is cold. She states she lives at home with her daughter Mary Burch. She is complaining of chest pain worsened by pushing on her chest and when asked where it hurts she states, \"my heart hurts\".      Medications: Reviewed    Infusion Medications    sodium chloride 100 mL/hr at 10/11/20 7509     Scheduled Medications    enoxaparin  1.5 mg/kg (Adjusted) Subcutaneous Daily    aztreonam  500 mg Intravenous Q8H    pantoprazole  40 mg Intravenous Daily    vancomycin (VANCOCIN) intermittent dosing (placeholder)   Other RX Placeholder    [Held by provider] rivaroxaban  15 mg Oral Daily    miconazole   Topical BID    aspirin  81 mg Oral Daily    dilTIAZem  120 mg Oral Daily    fluticasone  1 spray Nasal Daily    rOPINIRole  0.5 mg Oral Daily    lisinopril  10 mg Oral Daily    sodium chloride flush  10 mL Intravenous 2 times per day     PRN Meds: acetaminophen, meclizine, sodium chloride flush, acetaminophen **OR** acetaminophen, polyethylene glycol, ondansetron, albuterol      Intake/Output Summary (Last 24 hours) at 10/11/2020 0812  Last data filed at 10/11/2020 0537  Gross per 24 hour   Intake 3128 ml   Output 860 ml   Net 2268 ml       Physical Exam Performed:    BP (!) 150/66   Pulse 64   Temp 97.9 °F (36.6 °C) (Axillary)   Resp 16   Ht 5' 4\" (1.626 m)   Wt 149 lb (67.6 kg)   SpO2 99%   BMI 25.58 kg/m²     General appearance:  Pt appeared in distress and oriented to self only and now place. HEENT:  Normal cephalic, atraumatic without obvious deformity. Neck: Supple. No jugular venous distention. Trachea midline. Respiratory:  Normal respiratory effort. CTABL, without Rales/Wheezes/Rhonchi. Cardiovascular:  Irregularly irregular rate and rhythm with normal S1/S2. Chest wall tender to palpation. Abdomen: +BS, non-distended, soft, generalized tenderness improved from yesterday. Musculoskeletal:  No clubbing, cyanosis or edema bilaterally. Skin: Warm and dry  Neurologic:  No focal deficits, no gross sensory,motor deficits  Psychiatric:  Alert but not oriented.  Pt says she knows she is in the hospital.  Capillary Refill: Brisk,< 3 seconds   Peripheral Pulses: +2 palpable, equal bilaterally     Labs: Recent Labs     10/10/20  0555 10/11/20  0537   WBC 5.6 6.2   HGB 11.5* 11.3*   HCT 35.7* 35.5*    150     Recent Labs     10/10/20  0555      K 3.9      CO2 18*   BUN 17   CREATININE 0.8   CALCIUM 8.7     No results for input(s): AST, ALT, BILIDIR, BILITOT, ALKPHOS in the last 72 hours. No results for input(s): INR in the last 72 hours. No results for input(s): Tram [place] in the last 72 hours. Urinalysis:      Lab Results   Component Value Date    NITRU Negative 10/07/2020    WBCUA >100 10/07/2020    BACTERIA 4+ 10/07/2020    RBCUA see below 10/07/2020    BLOODU SMALL 10/07/2020    SPECGRAV 1.025 10/07/2020    GLUCOSEU Negative 10/07/2020    GLUCOSEU NEGATIVE 04/24/2012       Radiology:  CT HEAD WO CONTRAST   Final Result   No acute intracranial abnormality. Atrophy more pronounced in the posterior fossa, unchanged. CT ABDOMEN PELVIS WO CONTRAST Additional Contrast? None   Final Result   No acute findings. Gallstones. XR CHEST PORTABLE   Final Result   Negative portable chest x-ray. Assessment/Plan:    Active Hospital Problems    Diagnosis Date Noted    Bacteremia due to Gram-positive bacteria [R78.81] 10/09/2020    Acute UTI [N39.0] 10/07/2020    Hypotension [I95.9] 10/07/2020    Altered mental status [R41.82] 10/07/2020    Acute kidney injury ([facility] [address] [N17.9] 05/10/2016     1. AMS possibly 2/2 Acute Cystitis and sepsis, but if not responding to abx treatment may need dementia workup.   -Check TSH, iron, b1, b12, folate, rpr, sed rate if no improvement, CT if acute worsening.  - Patient on vanc for gram positive bacteremia. Patient refusing PO so restarted on aztreonam per ID.    - Urine culture - e coli  - Blood culture positive - clostridium perfringens  - Started on Vanc which is managed by pharm.     2. Sepsis with gram positive khurram bacteremia, ID showing Clostridium perfringens, white count responding to vanc.   Questionable allergy to cephalosporins and patient refusing PO.   - Infectious disease consult. Abx per ID.   - 1000 mL bolus NS given in ED.  - Monitor vitals and labs during visit. - Currently on 100 mL/hr of NS.  - Watch for signs of fluid overload  - Mentation improving, patient more alert today.     3. UTI:  -See AMS     4. Atypical Chest Pain:  Worsened by palpation  -Repeat EKG without acute ischemic changes, peaked T-waves in anterolateral leads have improved. -Troponin elevated at 0.04, will trend and consult cardiology. Elevation likely 2/2 demand ischemia from sepsis. 5. Acute Kidney Injury (Resolved)     6. Hypotension resolved with sepsis treatment. - Patient has had low BP since admission.  - Had BP of 71/46 on admission to floor in both arms. - 500 mg bolus NS given. - Most recent BP now 113/56  - Watch for signs of worsening sepsis and fluid overload. DVT Prophylaxis: Lovenox, SCDs  Diet: DIET DENTAL SOFT;  Dietary Nutrition Supplements: Frozen Oral Supplement  Code Status: Full Code  PT/OT Eval Status: Consulted    Dispo - Pending clinical improvement    This patient was seen and evaluated with the resident team and with the attending, Dr. Savana Gibbs.     (Please note that portions of this note were completed with a voice recognition program.  Efforts were made to edit the dictations but occasionally words are mis-transcribed.)    Rosa Diaz DO  Family Medicine Resident PGY2

## 2020-10-11 NOTE — PROGRESS NOTES
Inpatient Family Medicine Short Progress Note (Chart Update)      Subjective: Saw patient in their hospital room. At the time of my interview, the patient had no questions or concerns and did not appear to be in any acute discomfort or distress. However, the patient has still been refusing to take oral medications. Was able to get labwork last morning. Physical Exam Performed: Vital signs stable during interview. BP (!) 150/66   Pulse 64   Temp 97.9 °F (36.6 °C) (Axillary)   Resp 16   Ht 5' 4\" (1.626 m)   Wt 147 lb 14.9 oz (67.1 kg)   SpO2 99%   BMI 25.39 kg/m²     General appearance: No apparent distress, appears stated age and cooperative. Assessment/Plan:  Per Dr. Rayo Romano note, restarted Aztreonam as oral antibiotics don't seem to be a current option. Because kidney function has improved, Aztreonam 500mg TID has been ordered. Othewise see Dr. Evens Ingram note for full assessment and plan.       (Please note that portions of this note were completed with a voice recognition program.  Efforts were made to edit the dictations but occasionally words are mis-transcribed.)    Derian St DO  10/11/2020  Family Medicine Resident PGY-2  Available via [facility]

## 2020-10-11 NOTE — PROGRESS NOTES
Pt alert, confused. More oriented today than prior shift. Assessment completed as charted. Pt refusing PO meds but did drink water. Offered soft foods. Pt took one bite yogurt and declined additional food at this time. C/o being cold. More warm blankets applied. Repositioned for comfort and skin integrity. Will continue to monitor. Call light in reach.

## 2020-10-12 ENCOUNTER — APPOINTMENT (OUTPATIENT)
Dept: CT IMAGING | Age: 75
DRG: 871 | End: 2020-10-12
Payer: COMMERCIAL

## 2020-10-12 ENCOUNTER — PROCEDURE VISIT (OUTPATIENT)
Dept: CARDIOLOGY CLINIC | Age: 75
End: 2020-10-12
Payer: COMMERCIAL

## 2020-10-12 LAB
ANION GAP SERPL CALCULATED.3IONS-SCNC: 11 MMOL/L (ref 3–16)
BASOPHILS ABSOLUTE: 0.1 K/UL (ref 0–0.2)
BASOPHILS RELATIVE PERCENT: 1 %
BUN BLDV-MCNC: 6 MG/DL (ref 7–20)
CALCIUM SERPL-MCNC: 8.1 MG/DL (ref 8.3–10.6)
CHLORIDE BLD-SCNC: 107 MMOL/L (ref 99–110)
CO2: 18 MMOL/L (ref 21–32)
CREAT SERPL-MCNC: 0.6 MG/DL (ref 0.6–1.2)
EOSINOPHILS ABSOLUTE: 0.2 K/UL (ref 0–0.6)
EOSINOPHILS RELATIVE PERCENT: 3.5 %
GFR AFRICAN AMERICAN: >60
GFR NON-AFRICAN AMERICAN: >60
GLUCOSE BLD-MCNC: 87 MG/DL (ref 70–99)
HCT VFR BLD CALC: 35.4 % (ref 36–48)
HEMOGLOBIN: 11.5 G/DL (ref 12–16)
LYMPHOCYTES ABSOLUTE: 1.4 K/UL (ref 1–5.1)
LYMPHOCYTES RELATIVE PERCENT: 21.1 %
MCH RBC QN AUTO: 25 PG (ref 26–34)
MCHC RBC AUTO-ENTMCNC: 32.4 G/DL (ref 31–36)
MCV RBC AUTO: 76.9 FL (ref 80–100)
MONOCYTES ABSOLUTE: 0.6 K/UL (ref 0–1.3)
MONOCYTES RELATIVE PERCENT: 9.1 %
NEUTROPHILS ABSOLUTE: 4.2 K/UL (ref 1.7–7.7)
NEUTROPHILS RELATIVE PERCENT: 65.3 %
PDW BLD-RTO: 21.3 % (ref 12.4–15.4)
PLATELET # BLD: 148 K/UL (ref 135–450)
PMV BLD AUTO: 9.2 FL (ref 5–10.5)
POTASSIUM REFLEX MAGNESIUM: 3.7 MMOL/L (ref 3.5–5.1)
RBC # BLD: 4.6 M/UL (ref 4–5.2)
SODIUM BLD-SCNC: 136 MMOL/L (ref 136–145)
TOTAL SYPHILLIS IGG/IGM: NORMAL
VANCOMYCIN RANDOM: 16.1 UG/ML
WBC # BLD: 6.5 K/UL (ref 4–11)

## 2020-10-12 PROCEDURE — 1200000000 HC SEMI PRIVATE

## 2020-10-12 PROCEDURE — 2580000003 HC RX 258: Performed by: STUDENT IN AN ORGANIZED HEALTH CARE EDUCATION/TRAINING PROGRAM

## 2020-10-12 PROCEDURE — 80048 BASIC METABOLIC PNL TOTAL CA: CPT

## 2020-10-12 PROCEDURE — 6370000000 HC RX 637 (ALT 250 FOR IP): Performed by: STUDENT IN AN ORGANIZED HEALTH CARE EDUCATION/TRAINING PROGRAM

## 2020-10-12 PROCEDURE — 93281 PM DEVICE PROGR EVAL MULTI: CPT | Performed by: INTERNAL MEDICINE

## 2020-10-12 PROCEDURE — 84425 ASSAY OF VITAMIN B-1: CPT

## 2020-10-12 PROCEDURE — 2500000003 HC RX 250 WO HCPCS: Performed by: STUDENT IN AN ORGANIZED HEALTH CARE EDUCATION/TRAINING PROGRAM

## 2020-10-12 PROCEDURE — 6360000002 HC RX W HCPCS: Performed by: INTERNAL MEDICINE

## 2020-10-12 PROCEDURE — 2580000003 HC RX 258: Performed by: INTERNAL MEDICINE

## 2020-10-12 PROCEDURE — 36415 COLL VENOUS BLD VENIPUNCTURE: CPT

## 2020-10-12 PROCEDURE — 6360000004 HC RX CONTRAST MEDICATION: Performed by: FAMILY MEDICINE

## 2020-10-12 PROCEDURE — 6360000002 HC RX W HCPCS

## 2020-10-12 PROCEDURE — 99232 SBSQ HOSP IP/OBS MODERATE 35: CPT | Performed by: INTERNAL MEDICINE

## 2020-10-12 PROCEDURE — 99222 1ST HOSP IP/OBS MODERATE 55: CPT | Performed by: INTERNAL MEDICINE

## 2020-10-12 PROCEDURE — 6360000002 HC RX W HCPCS: Performed by: STUDENT IN AN ORGANIZED HEALTH CARE EDUCATION/TRAINING PROGRAM

## 2020-10-12 PROCEDURE — 80202 ASSAY OF VANCOMYCIN: CPT

## 2020-10-12 PROCEDURE — C9113 INJ PANTOPRAZOLE SODIUM, VIA: HCPCS | Performed by: STUDENT IN AN ORGANIZED HEALTH CARE EDUCATION/TRAINING PROGRAM

## 2020-10-12 PROCEDURE — 71260 CT THORAX DX C+: CPT

## 2020-10-12 PROCEDURE — 2500000003 HC RX 250 WO HCPCS: Performed by: INTERNAL MEDICINE

## 2020-10-12 PROCEDURE — 85025 COMPLETE CBC W/AUTO DIFF WBC: CPT

## 2020-10-12 RX ADMIN — PANTOPRAZOLE SODIUM 40 MG: 40 INJECTION, POWDER, FOR SOLUTION INTRAVENOUS at 10:14

## 2020-10-12 RX ADMIN — SODIUM CHLORIDE: 9 INJECTION, SOLUTION INTRAVENOUS at 05:44

## 2020-10-12 RX ADMIN — AZTREONAM 500 MG: 1 INJECTION, POWDER, LYOPHILIZED, FOR SOLUTION INTRAMUSCULAR; INTRAVENOUS at 10:23

## 2020-10-12 RX ADMIN — MICONAZOLE NITRATE: 20 POWDER TOPICAL at 11:45

## 2020-10-12 RX ADMIN — METRONIDAZOLE 500 MG: 500 INJECTION, SOLUTION INTRAVENOUS at 18:58

## 2020-10-12 RX ADMIN — SODIUM CHLORIDE: 9 INJECTION, SOLUTION INTRAVENOUS at 18:55

## 2020-10-12 RX ADMIN — IOPAMIDOL 75 ML: 755 INJECTION, SOLUTION INTRAVENOUS at 17:09

## 2020-10-12 RX ADMIN — MICONAZOLE NITRATE: 20 POWDER TOPICAL at 21:02

## 2020-10-12 RX ADMIN — CEFTRIAXONE SODIUM 1 G: 1 INJECTION, POWDER, FOR SOLUTION INTRAMUSCULAR; INTRAVENOUS at 18:57

## 2020-10-12 ASSESSMENT — PAIN SCALES - GENERAL
PAINLEVEL_OUTOF10: 7
PAINLEVEL_OUTOF10: 7

## 2020-10-12 NOTE — PROGRESS NOTES
Device interrogation by company representative @ [address] [place] [address] Rd 10/12. See interrogation for further details. CRT-P programmed VVIR d/t chronic AF. (oac). Last interrogation 2/2019. She declines carlin remote monitoring. nsvt recorded, longest 5 sec. (cardizem cd). Possible fluid accumulation: exceeded OptiVol Threshold, 12-Oct-2020 -- ongoing. Pacing (% of Time Since 04-Feb-2019)  Total  99.9 %. See PACEART report under Cardiology tab.

## 2020-10-12 NOTE — CONSULTS
[facility]  [phone]      Attending Physician: Dama Prader, DO  Reason for Consultation/Chief Complaint: CP    Subjective   History of Present Illness:  Darby Kapoor is a 66 y.o. patient who presented to the hospital with complaints of CP. States had CP but not forthcoming, difficult histoprian.   states her heart brought her in but not CP. States she has CP but can't really characterize. Can't get details. Some SOB>     Past Medical History:   has a past medical history of Allergic, Anemia, Atrial fibrillation ([facility] [address] Breast cancer [facility]), Cardiac pacemaker in situ, Cellulitis, Diffuse pneumonia, Hypertension, Murmur, Osteoarthritis, and Sinus drainage. Surgical History:   has a past surgical history that includes Mastectomy (Left); Total knee arthroplasty; and hernia repair. Social History:   reports that she quit smoking about 26 years ago. Her smoking use included cigarettes. She has never used smokeless tobacco. She reports that she does not drink alcohol or use drugs. Family History:  family history includes Arthritis in her paternal grandfather; Heart Disease in her father and mother; High Blood Pressure in her father and mother. Home Medications:  Were reviewed and are listed in nursing record and/or below  Prior to Admission medications    Medication Sig Start Date End Date Taking?  Authorizing Provider   torsemide (DEMADEX) 20 MG tablet Take 1 tablet daily 8/28/20  Yes Matteo Kline MD   rivaroxaban (XARELTO) 20 MG TABS tablet TAKE 1 TABLET BY MOUTH DAILY WITH BREAKFAST 8/21/20  Yes Matteo Kline MD   dilTIAZem [facility]) 120 MG extended release capsule TAKE ONE CAPSULE BY MOUTH DAILY 8/20/20  Yes Matteo Kline MD   potassium chloride (KLOR-CON M) 20 MEQ extended release tablet TAKE 1 TABLET BY MOUTH EVERY DAY 8/6/20  Yes Harvie Goodpasture, MD   lisinopril (PRINIVIL;ZESTRIL) 10 MG tablet TAKE 1 TABLET BY MOUTH DAILY 7/15/20  Yes Davida Whitehead Rosi Shipley MD   rOPINIRole (REQUIP) 0.5 MG tablet Take 0.5 mg by mouth daily   Yes Historical Provider, MD   esomeprazole (NEXIUM) 40 MG delayed release capsule Take 40 mg by mouth every morning (before breakfast)   Yes Historical Provider, MD   ondansetron (ZOFRAN) 4 MG tablet Take 4 mg by mouth every 6 hours as needed for Nausea or Vomiting   Yes Historical Provider, MD   aspirin 81 MG tablet Take 81 mg by mouth daily   Yes Historical Provider, MD   albuterol sulfate  (90 BASE) MCG/ACT inhaler Inhale 2 puffs into the lungs every 6 hours as needed for Wheezing 2/16/17  Yes Rafael Guerrero,    acetaminophen (TYLENOL) 325 MG tablet Take 650 mg by mouth every 4 hours as needed for Pain   Yes Historical Provider, MD   meclizine (ANTIVERT) 25 MG tablet TAKE 1 TABLET BY MOUTH EVERY DAY  Patient taking differently: prn 8/31/15  Yes MARIOLA Devi CNP   fluticasone (FLONASE) 50 MCG/ACT nasal spray 1 spray by Nasal route daily. Yes Historical Provider, MD   calcium carbonate (TUMS) 500 MG chewable tablet Take 1 tablet by mouth 2 times daily.  12/23/10  Yes Historical Provider, MD   CALCIUM CARBONATE-VITAMIN D PO Take 600 mg by mouth 2 times daily     Historical Provider, MD        CURRENT Medications:  vancomycin (VANCOCIN) 1,250 mg in dextrose 5 % 250 mL IVPB, Q24H  enoxaparin (LOVENOX) injection 90 mg, Daily  aztreonam (AZACTAM) 500 mg in dextrose 5 % 50 mL IVPB, Q8H  pantoprazole (PROTONIX) injection 40 mg, Daily  [Held by provider] rivaroxaban (XARELTO) tablet 15 mg, Daily  miconazole (MICOTIN) 2 % powder, BID  acetaminophen (TYLENOL) tablet 650 mg, Q4H PRN  aspirin chewable tablet 81 mg, Daily  dilTIAZem (CARDIZEM CD) extended release capsule 120 mg, Daily  fluticasone (FLONASE) 50 MCG/ACT nasal spray 1 spray, Daily  rOPINIRole (REQUIP) tablet 0.5 mg, Daily  meclizine (ANTIVERT) tablet 25 mg, Daily PRN  lisinopril (PRINIVIL;ZESTRIL) tablet 10 mg, Daily  sodium chloride flush 0.9 % injection 10 mL, 2 times per day  sodium chloride flush 0.9 % injection 10 mL, PRN  acetaminophen (TYLENOL) tablet 650 mg, Q6H PRN    Or  acetaminophen (TYLENOL) suppository 650 mg, Q6H PRN  polyethylene glycol (GLYCOLAX) packet 17 g, Daily PRN  0.9 % sodium chloride infusion, Continuous  ondansetron (ZOFRAN-ODT) disintegrating tablet 4 mg, Q6H PRN  albuterol (PROVENTIL) nebulizer solution 2.5 mg, Q4H PRN        Allergies:  Amoxicillin-pot clavulanate; Betadine [povidone iodine]; Cipro xr; Morphine; Phenergan [promethazine hcl]; Promethazine; Sulfamethoxazole-trimethoprim; Sulfamethoxazole-trimethoprim; Tape [adhesive tape]; Cephalexin; Codeine; and Prochlorperazine edisylate     Review of Systems:   A 14 point review of symptoms completed. Pertinent positives identified in the HPI, all other review of symptoms negative as below.       Objective   PHYSICAL EXAM:    Vitals:    10/12/20 0848   BP: 126/67   Pulse: 60   Resp: 18   Temp: 97.6 °F (36.4 °C)   SpO2: 97%    Weight: 154 lb 12.2 oz (70.2 kg)         General Appearance:  Alert, cooperative, no distress, appears stated age   Head:  Normocephalic, without obvious abnormality, atraumatic   Eyes:  PERRL, conjunctiva/corneas clear   Nose: Nares normal, no drainage or sinus tenderness   Throat: Lips, mucosa, and tongue normal   Neck: Supple, symmetrical, trachea midline, no adenopathy, thyroid: not enlarged, symmetric, no tenderness/mass/nodules, no carotid bruit or JVD   Lungs:   Clear to auscultation bilaterally, respirations unlabored   Chest Wall:   Fairly tender over left pacemaker site especially lateral    Heart:  Regular rate and rhythm, S1, S2 normal, no murmur, rub or gallop   Abdomen:   Soft, non-tender, bowel sounds active all four quadrants,  no masses, no organomegaly   Extremities: Extremities normal, atraumatic, no cyanosis or edema   Pulses: 2+ and symmetric   Skin: Skin color, texture, turgor normal, no rashes or lesions   Pysch: Normal mood and affect, but is confused and oriented to name and location only   Neurologic: Normal gross motor and sensory exam.         Labs   CBC:   Lab Results   Component Value Date    WBC 6.5 10/12/2020    RBC 4.60 10/12/2020    RBC 4.52 01/05/2016    HGB 11.5 10/12/2020    HCT 35.4 10/12/2020    MCV 76.9 10/12/2020    RDW 21.3 10/12/2020     10/12/2020     CMP:  Lab Results   Component Value Date     10/10/2020    K 3.9 10/10/2020     10/10/2020    CO2 18 10/10/2020    BUN 17 10/10/2020    CREATININE 0.8 10/10/2020    GFRAA >60 10/10/2020    GFRAA >60 04/25/2012    AGRATIO 1.2 10/08/2020    LABGLOM >60 10/10/2020    GLUCOSE 74 10/10/2020    PROT 5.6 10/08/2020    PROT 6.0 04/24/2012    CALCIUM 8.7 10/10/2020    BILITOT 0.9 10/08/2020    ALKPHOS 47 10/08/2020    AST 37 10/08/2020    ALT 11 10/08/2020     PT/INR:  No results found for: PTINR  HgBA1c:No results found for: LABA1C  Lab Results   Component Value Date    TROPONINI 0.03 (H) 10/11/2020         Cardiac Data     Last EKG:   10/7/2020 afib with V-paced at 60.     Echo: 5/2019   Normal left ventricular systolic function with a visually estimated ejection   fraction of 55%. No regional wall motion abnormalities are seen. Normal left ventricular diastolic filling pressure. Bi-atrial enlargement. Mild mitral and tricuspid regurgitation. Systolic pulmonary artery pressure (SPAP) is normal and estimated at 28 mmHg   (right atrial pressure 8 mmHg). Stress Test:    Cath:    Studies:   CXR:  Negative      Assessment and Plan      1. Elevated Troponin: Flat, not c/w ACS  2. CP: Typical.  Tender to palpation  3. Chronic diastolic CHF   - Wt 293LMZ last d/c home and today 155lbs. - appears stable and compensated. 4. Paroxysmal Atrial fib:   - On Xarelto   - S/p AV meek ablation:  5. Complete heart block:     -2015 s/p pacer s/p  pacer Dependent,  6. HTN  7. PATITO: improved  8. UTI: per IM      PLAN  1.  Elevated trops not c/w ACS and likely increased cardio metabolic demand from hypotension and PATITO. 2. I do not see where her device as been interogated in >1yr   - Will call [employer]  3. Her chest pain is atypical however due to EKG unable to evaluate for ischemia. . I will get a limited echo to evaluate for wall motion but also to ensure that there is no vegetation on the pacemaker leads. 4.  She is exquisitely tender to her pacemaker site which is unusual given its implant date. Would suggest chest CT to further evaluate to make sure there is no pockets of infection although this is less likely by her exam      Pt follows with Dr. Lev Beltrán        Patient Active Problem List   Diagnosis    Colitis    Hypokalemia    Near syncope    Essential hypertension    Vertigo    Dizziness    CHF exacerbation ([address]    Chronic diastolic congestive heart failure (HCC)    Chronic atrial fibrillation ([address]    Pacemaker    Anticoagulation goal of INR 2 to 3    Physical debility    History of breast cancer    Acute kidney injury ([address]    Abdominal pain    Chest pain    Complete heart block (HCC)    Diffuse pneumonia    Acute UTI    Hypotension    Altered mental status    Bacteremia due to Gram-positive bacteria           Thank you for allowing us to participate in the care of Gloria Ray. Please call me with any questions [phone]. Bill Chung MD, [address] Cardiologist  [facility]  [phone] [facility]  [phone] [address] [place] [address]  10/12/2020 1:42 PM    I will address the patient's cardiac risk factors and adjusted pharmacologic treatment as needed. In addition, I have reinforced the need for patient directed risk factor modification. All questions and concerns were addressed to the patient/family. Alternatives to my treatment were discussed. The note was completed using EMR.  Every effort was made to ensure accuracy; however, inadvertent computerized transcription errors may be present.

## 2020-10-12 NOTE — PROGRESS NOTES
Occupational Therapy  OT attempted to see pt again this afternoon per RN request. RN stated that pt has agreed to participate in therapy this afternoon during conversation with MD.  However, when OT tried to work with pt, she refused to participate in any activity and would not provide information on reason for refusal.     OT encouraged pt's participation and attempted to modify activity to suit her needs as she reports that she is cold (ie providing a warm blanket as she sits EOB, increasing temp in room, putting another gown behind her.) She continued to refuse, saying \"Leave me alone\". OT spoke with pt regarding concern that she has not been out of bed during her stay. OT moved a pillow on the bed in order for pt to sit on side of bed. Pt became very upset and refused to participate. OT will try 1 additional session. If pt continues to refuse, will sign off.   Yasmine Garza OT

## 2020-10-12 NOTE — PLAN OF CARE
Problem: Falls - Risk of:  Goal: Will remain free from falls  Description: Will remain free from falls  Outcome: Ongoing    Pt remains free from fall or injury. Nonskid socks on. Bedside table and call light within reach. Bed alarm on. AVASYS on. Pt denies any other needs at this time. Will continue to monitor.

## 2020-10-12 NOTE — PROGRESS NOTES
pudding without assistance. She continues to complain she is cold. Medications:  Reviewed    Infusion Medications    sodium chloride 100 mL/hr at 10/12/20 0544     Scheduled Medications    enoxaparin  1.5 mg/kg (Adjusted) Subcutaneous Daily    aztreonam  500 mg Intravenous Q8H    pantoprazole  40 mg Intravenous Daily    vancomycin (VANCOCIN) intermittent dosing (placeholder)   Other RX Placeholder    [Held by provider] rivaroxaban  15 mg Oral Daily    miconazole   Topical BID    aspirin  81 mg Oral Daily    dilTIAZem  120 mg Oral Daily    fluticasone  1 spray Nasal Daily    rOPINIRole  0.5 mg Oral Daily    lisinopril  10 mg Oral Daily    sodium chloride flush  10 mL Intravenous 2 times per day     PRN Meds: acetaminophen, meclizine, sodium chloride flush, acetaminophen **OR** acetaminophen, polyethylene glycol, ondansetron, albuterol      Intake/Output Summary (Last 24 hours) at 10/12/2020 0721  Last data filed at 10/11/2020 2143  Gross per 24 hour   Intake 1802 ml   Output 800 ml   Net 1002 ml       Physical Exam Performed:    /65   Pulse 60   Temp 98 °F (36.7 °C) (Oral)   Resp 18   Ht 5' 4\" (1.626 m)   Wt 154 lb 12.2 oz (70.2 kg)   SpO2 96%   BMI 26.57 kg/m²     General appearance:  Pt appeared in distress and oriented to self and place.    HEENT:  Normal cephalic, atraumatic without obvious deformity. Neck: Supple. No jugular venous distention. Trachea midline. Respiratory:  Normal respiratory effort. CTABL, without Rales/Wheezes/Rhonchi. Cardiovascular:  Irregularly irregular rate and rhythm with normal S1/S2. Chest wall severely tender to palpation. Abdomen: +BS, non-distended, soft, generalized tenderness improved from yesterday. Musculoskeletal:  No clubbing, cyanosis or edema bilaterally. Skin: Warm and dry  Neurologic:  No focal deficits, no gross sensory,motor deficits  Psychiatric:  Alert but not oriented.  Pt says she knows she is in the hospital.  Capillary Refill: Brisk,< 3 seconds   Peripheral Pulses: +2 palpable, equal bilaterally     Labs:   Recent Labs     10/10/20  0555 10/11/20  0537 10/12/20  0636   WBC 5.6 6.2 6.5   HGB 11.5* 11.3* 11.5*   HCT 35.7* 35.5* 35.4*    150 148     Recent Labs     10/10/20  0555      K 3.9      CO2 18*   BUN 17   CREATININE 0.8   CALCIUM 8.7     No results for input(s): AST, ALT, BILIDIR, BILITOT, ALKPHOS in the last 72 hours. No results for input(s): INR in the last 72 hours. Recent Labs     10/11/20  0537 10/11/20  1105   TROPONINI 0.04* 0.03*       Urinalysis:      Lab Results   Component Value Date    NITRU Negative 10/07/2020    WBCUA >100 10/07/2020    BACTERIA 4+ 10/07/2020    RBCUA see below 10/07/2020    BLOODU SMALL 10/07/2020    SPECGRAV 1.025 10/07/2020    GLUCOSEU Negative 10/07/2020    GLUCOSEU NEGATIVE 04/24/2012       Radiology:  CT HEAD WO CONTRAST   Final Result   No acute intracranial abnormality. Atrophy more pronounced in the posterior fossa, unchanged. CT ABDOMEN PELVIS WO CONTRAST Additional Contrast? None   Final Result   No acute findings. Gallstones. XR CHEST PORTABLE   Final Result   Negative portable chest x-ray. Assessment/Plan:    Active Hospital Problems    Diagnosis Date Noted    Bacteremia due to Gram-positive bacteria [R78.81] 10/09/2020    Acute UTI [N39.0] 10/07/2020    Hypotension [I95.9] 10/07/2020    Altered mental status [R41.82] 10/07/2020    Acute kidney injury ([facility] [address] [N17.9] 05/10/2016     1. AMS possibly 2/2 Acute Cystitis and sepsis, but if not responding to abx treatment may need dementia workup. -TSH, RPR, b12, folate, and iron studies noncontributory. B1 pending.  - Patient on vanc for gram positive bacteremia. Patient refusing PO so restarted on aztreonam per ID.    - Urine culture - e coli  - Blood culture positive - clostridium perfringens  -Encouraged nutrition and working with PT/OT.   Patient refusing PT/OT, cont'd

## 2020-10-12 NOTE — PROGRESS NOTES
Dr. Gaye Abernathy requested to call PT to set up a time for tomorrow where he and a nurse can be there to encourage the pt. I was unable to reach PT by phone. Will follow up with night shift rn.

## 2020-10-12 NOTE — PLAN OF CARE
Prn pain medication options discussed with pt. Pt has been refusing all PO medication.   Will continue to monitor

## 2020-10-12 NOTE — PROGRESS NOTES
Occupational Therapy  Pt refused OT tx this am. Attempted to modify activity for pt to feel comfortable to participate. She continued to refuse, saying she was \"cold\". Cont OT.   Marycarmen Jerome OT

## 2020-10-12 NOTE — PROGRESS NOTES
Pt oriented to self, still confused. Pt refusing all PO medication, also refusing injections and nasal spray. Pt refusing PT. Chest CT scan today, echo ordered. Pt refusing meals, but will have pudding or ensure. Fortified pudding and ensure was ordered. Will continue to monitor.

## 2020-10-12 NOTE — PROGRESS NOTES
Infectious Disease Follow up Notes    CC :  UTI, Clostridia bacteremia      Antibiotics:  Aztreonam 500 q8  vanc 1250 q24     Admit Date:   10/7/2020  Hospital Day: 6    Subjective:   She remains afebrile on room air   \"I'm cold\"  Tells me she cannot swallow medications     Objective:     Patient Vitals for the past 8 hrs:   BP Temp Temp src Pulse Resp SpO2   10/12/20 1631 128/84 97.2 °F (36.2 °C) Oral 60 18 98 %   10/12/20 1432 -- -- Oral 60 18 --       EXAM:  General:  Alert, cooperative with exam, no apparent distress    HEENT:   NCAT, PERRL, sclera anicteric  MMM, no thrush  NECK:  supple      LUNGS:  Non-labored breathing.   Upper lobes clear    CV:   RRR  ABD: Soft, flat, NT    EXT:  No focal rash or LE edema         LINE: PIV site ok         Scheduled Meds:   vancomycin  20 mg/kg (Adjusted) Intravenous Q24H    enoxaparin  1.5 mg/kg (Adjusted) Subcutaneous Daily    aztreonam  500 mg Intravenous Q8H    pantoprazole  40 mg Intravenous Daily    [Held by provider] rivaroxaban  15 mg Oral Daily    miconazole   Topical BID    aspirin  81 mg Oral Daily    dilTIAZem  120 mg Oral Daily    fluticasone  1 spray Nasal Daily    rOPINIRole  0.5 mg Oral Daily    lisinopril  10 mg Oral Daily    sodium chloride flush  10 mL Intravenous 2 times per day       Continuous Infusions:   sodium chloride 100 mL/hr at 10/12/20 0544          Data Review:    Lab Results   Component Value Date    WBC 6.5 10/12/2020    HGB 11.5 (L) 10/12/2020    HCT 35.4 (L) 10/12/2020    MCV 76.9 (L) 10/12/2020     10/12/2020     Lab Results   Component Value Date    CREATININE 0.6 10/12/2020    BUN 6 (L) 10/12/2020     10/12/2020    K 3.7 10/12/2020     10/12/2020    CO2 18 (L) 10/12/2020       Hepatic Function Panel:   Lab Results   Component Value Date    ALKPHOS 47 10/08/2020    ALT 11 10/08/2020    AST 37 10/08/2020    PROT 5.6 10/08/2020 PROT 6.0 04/24/2012    BILITOT 0.9 10/08/2020    BILIDIR 0.30 04/24/2012    IBILI 0.5 04/24/2012    LABALBU 3.1 10/08/2020       Cultures:   10/7     BC #1 C perfringens   BC #2 neg              UC >100k E coli pan-S        Radiology Review:  All pertinent images / reports were reviewed as a part of this visit. CT a/p gallstones, no acute pathology  CT head, CXR negative       Assessment:     Patient Active Problem List    Diagnosis Date Noted    Chronic diastolic congestive heart failure ([address]      Priority: Medium    Essential hypertension 03/25/2015     Priority: Medium    Near syncope      Priority: Low    Elevated troponin     PAF (paroxysmal atrial fibrillation) ([address]     History of pacemaker     Bacteremia due to Gram-positive bacteria 10/09/2020    Acute UTI 10/07/2020    Hypotension 10/07/2020    Altered mental status 10/07/2020    Diffuse pneumonia 05/09/2019    Complete heart block ([address] 02/05/2019    Chest pain 02/12/2017    Abdominal pain 05/28/2016    Acute kidney injury ([address] 05/10/2016    History of breast cancer 11/24/2015    Anticoagulation goal of INR 2 to 3     Physical debility     Pacemaker     Chronic atrial fibrillation ([address]     CHF exacerbation ([address] 08/19/2015    Dizziness     Vertigo 03/25/2015    Colitis 04/24/2012    Hypokalemia 04/24/2012       Admitted 10/7/20 with 2 week history weakness, anorexia, lethargy, poor po intake     E coli UTI, sensitive organism     Positive BC, 1/2 sets collected on admission with Clostridia perfringens  ? Transient bacteremia from colonic source in the context of relative hypotension possible; the colon appeared normal on CT scan.      PATITO - resolving      Listed allergy Augmentin, cipro, sulfa, cephalexin (rash)  She has received several course oral and parenteral cephalosporins in the past      Plan:    Will change abx to ceftriaxone and flagyl to cover the E coli and Clostridium and less toxicity vs vanc/aztreonam.  She has tolerated cephalosporins in the past    Once she is able to take po well, could change to po cefdinir and flagyl to complete the course         Belem Moreau MD  Phone: [phone]   Fax : [phone]

## 2020-10-13 LAB
ANION GAP SERPL CALCULATED.3IONS-SCNC: 11 MMOL/L (ref 3–16)
BASOPHILS ABSOLUTE: 0.1 K/UL (ref 0–0.2)
BASOPHILS RELATIVE PERCENT: 0.9 %
BUN BLDV-MCNC: 5 MG/DL (ref 7–20)
CALCIUM SERPL-MCNC: 8.1 MG/DL (ref 8.3–10.6)
CHLORIDE BLD-SCNC: 108 MMOL/L (ref 99–110)
CO2: 19 MMOL/L (ref 21–32)
CREAT SERPL-MCNC: 0.6 MG/DL (ref 0.6–1.2)
EOSINOPHILS ABSOLUTE: 0.3 K/UL (ref 0–0.6)
EOSINOPHILS RELATIVE PERCENT: 3.6 %
GFR AFRICAN AMERICAN: >60
GFR NON-AFRICAN AMERICAN: >60
GLUCOSE BLD-MCNC: 72 MG/DL (ref 70–99)
HCT VFR BLD CALC: 36.4 % (ref 36–48)
HEMOGLOBIN: 11.8 G/DL (ref 12–16)
LYMPHOCYTES ABSOLUTE: 1.6 K/UL (ref 1–5.1)
LYMPHOCYTES RELATIVE PERCENT: 22.3 %
MAGNESIUM: 1.5 MG/DL (ref 1.8–2.4)
MCH RBC QN AUTO: 25.2 PG (ref 26–34)
MCHC RBC AUTO-ENTMCNC: 32.4 G/DL (ref 31–36)
MCV RBC AUTO: 77.7 FL (ref 80–100)
MONOCYTES ABSOLUTE: 0.6 K/UL (ref 0–1.3)
MONOCYTES RELATIVE PERCENT: 8.2 %
NEUTROPHILS ABSOLUTE: 4.6 K/UL (ref 1.7–7.7)
NEUTROPHILS RELATIVE PERCENT: 65 %
PDW BLD-RTO: 21.8 % (ref 12.4–15.4)
PLATELET # BLD: 148 K/UL (ref 135–450)
PMV BLD AUTO: 9.1 FL (ref 5–10.5)
POTASSIUM REFLEX MAGNESIUM: 3.2 MMOL/L (ref 3.5–5.1)
RBC # BLD: 4.69 M/UL (ref 4–5.2)
SODIUM BLD-SCNC: 138 MMOL/L (ref 136–145)
WBC # BLD: 7.1 K/UL (ref 4–11)

## 2020-10-13 PROCEDURE — 97530 THERAPEUTIC ACTIVITIES: CPT

## 2020-10-13 PROCEDURE — C9113 INJ PANTOPRAZOLE SODIUM, VIA: HCPCS | Performed by: STUDENT IN AN ORGANIZED HEALTH CARE EDUCATION/TRAINING PROGRAM

## 2020-10-13 PROCEDURE — 2580000003 HC RX 258: Performed by: INTERNAL MEDICINE

## 2020-10-13 PROCEDURE — 97110 THERAPEUTIC EXERCISES: CPT

## 2020-10-13 PROCEDURE — 6360000002 HC RX W HCPCS: Performed by: INTERNAL MEDICINE

## 2020-10-13 PROCEDURE — 85025 COMPLETE CBC W/AUTO DIFF WBC: CPT

## 2020-10-13 PROCEDURE — 6360000002 HC RX W HCPCS

## 2020-10-13 PROCEDURE — 80048 BASIC METABOLIC PNL TOTAL CA: CPT

## 2020-10-13 PROCEDURE — 99233 SBSQ HOSP IP/OBS HIGH 50: CPT | Performed by: INTERNAL MEDICINE

## 2020-10-13 PROCEDURE — 2500000003 HC RX 250 WO HCPCS: Performed by: STUDENT IN AN ORGANIZED HEALTH CARE EDUCATION/TRAINING PROGRAM

## 2020-10-13 PROCEDURE — 2580000003 HC RX 258: Performed by: STUDENT IN AN ORGANIZED HEALTH CARE EDUCATION/TRAINING PROGRAM

## 2020-10-13 PROCEDURE — 6360000002 HC RX W HCPCS: Performed by: STUDENT IN AN ORGANIZED HEALTH CARE EDUCATION/TRAINING PROGRAM

## 2020-10-13 PROCEDURE — 2500000003 HC RX 250 WO HCPCS: Performed by: INTERNAL MEDICINE

## 2020-10-13 PROCEDURE — 2500000003 HC RX 250 WO HCPCS

## 2020-10-13 PROCEDURE — 83735 ASSAY OF MAGNESIUM: CPT

## 2020-10-13 PROCEDURE — 1200000000 HC SEMI PRIVATE

## 2020-10-13 PROCEDURE — 36415 COLL VENOUS BLD VENIPUNCTURE: CPT

## 2020-10-13 RX ORDER — POTASSIUM CHLORIDE 7.45 MG/ML
10 INJECTION INTRAVENOUS PRN
Status: DISCONTINUED | OUTPATIENT
Start: 2020-10-13 | End: 2020-10-17 | Stop reason: HOSPADM

## 2020-10-13 RX ORDER — DEXTROSE, SODIUM CHLORIDE, AND POTASSIUM CHLORIDE 5; .45; .15 G/100ML; G/100ML; G/100ML
INJECTION INTRAVENOUS CONTINUOUS
Status: DISCONTINUED | OUTPATIENT
Start: 2020-10-13 | End: 2020-10-13

## 2020-10-13 RX ORDER — MAGNESIUM SULFATE IN WATER 40 MG/ML
2 INJECTION, SOLUTION INTRAVENOUS ONCE
Status: COMPLETED | OUTPATIENT
Start: 2020-10-13 | End: 2020-10-13

## 2020-10-13 RX ORDER — DEXTROSE, SODIUM CHLORIDE, AND POTASSIUM CHLORIDE 5; .9; .15 G/100ML; G/100ML; G/100ML
INJECTION INTRAVENOUS CONTINUOUS
Status: DISCONTINUED | OUTPATIENT
Start: 2020-10-13 | End: 2020-10-15

## 2020-10-13 RX ORDER — POLYETHYLENE GLYCOL 3350 17 G/17G
17 POWDER, FOR SOLUTION ORAL DAILY
Status: DISCONTINUED | OUTPATIENT
Start: 2020-10-13 | End: 2020-10-17 | Stop reason: HOSPADM

## 2020-10-13 RX ADMIN — POTASSIUM CHLORIDE 10 MEQ: 10 INJECTION, SOLUTION INTRAVENOUS at 08:47

## 2020-10-13 RX ADMIN — SODIUM CHLORIDE: 9 INJECTION, SOLUTION INTRAVENOUS at 06:26

## 2020-10-13 RX ADMIN — POTASSIUM CHLORIDE 10 MEQ: 10 INJECTION, SOLUTION INTRAVENOUS at 13:50

## 2020-10-13 RX ADMIN — METRONIDAZOLE 500 MG: 500 INJECTION, SOLUTION INTRAVENOUS at 11:03

## 2020-10-13 RX ADMIN — METRONIDAZOLE 500 MG: 500 INJECTION, SOLUTION INTRAVENOUS at 02:37

## 2020-10-13 RX ADMIN — ENOXAPARIN SODIUM 90 MG: 100 INJECTION SUBCUTANEOUS at 08:46

## 2020-10-13 RX ADMIN — METRONIDAZOLE 500 MG: 500 INJECTION, SOLUTION INTRAVENOUS at 18:49

## 2020-10-13 RX ADMIN — POTASSIUM CHLORIDE, DEXTROSE MONOHYDRATE AND SODIUM CHLORIDE: 150; 5; 900 INJECTION, SOLUTION INTRAVENOUS at 21:15

## 2020-10-13 RX ADMIN — CEFTRIAXONE SODIUM 1 G: 1 INJECTION, POWDER, FOR SOLUTION INTRAMUSCULAR; INTRAVENOUS at 17:34

## 2020-10-13 RX ADMIN — POTASSIUM CHLORIDE, DEXTROSE MONOHYDRATE AND SODIUM CHLORIDE: 150; 5; 450 INJECTION, SOLUTION INTRAVENOUS at 12:29

## 2020-10-13 RX ADMIN — MICONAZOLE NITRATE: 20 POWDER TOPICAL at 19:49

## 2020-10-13 RX ADMIN — MICONAZOLE NITRATE: 20 POWDER TOPICAL at 08:44

## 2020-10-13 RX ADMIN — PANTOPRAZOLE SODIUM 40 MG: 40 INJECTION, POWDER, FOR SOLUTION INTRAVENOUS at 08:46

## 2020-10-13 RX ADMIN — MAGNESIUM SULFATE HEPTAHYDRATE 2 G: 40 INJECTION, SOLUTION INTRAVENOUS at 08:42

## 2020-10-13 ASSESSMENT — PAIN SCALES - GENERAL: PAINLEVEL_OUTOF10: 0

## 2020-10-13 NOTE — PROGRESS NOTES
Physical Therapy  Facility/Department: [facility] C3 TELE/MED SURG/ONC  Daily Treatment Note  NAME: Romel Red  : 1945  MRN: [id number]    Date of Service: 10/13/2020    Discharge Recommendations:  [facility] (SNF trial pending pt's participation)     Assessment   Body structures, Functions, Activity limitations: Decreased functional mobility ; Decreased cognition  Assessment: Pt cognition limted session. Pt was able to get OOB to chair with RW and Kayla X 2 but refused other trials of standing or walking. Pt did perform some exs but not by command, rather perfomred then as she pleased for as long as she pleased then refused further instruction. Pt appears confused but possibly is manipulating the session. 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 Treatment Diagnosis: decreased mobility  Prognosis: Good  Decision Making: Medium Complexity  PT Education: Goals;PT Role;Disease Specific Education  Patient Education: educated pt re importance of mobility in acute setting; pt refused teaching and needs reinforcement  Barriers to Learning: agitation, cognition  REQUIRES PT FOLLOW UP: Yes  Activity Tolerance  Activity Tolerance: Patient Tolerated treatment well;Patient limited by cognitive status     Patient Diagnosis(es): The primary encounter diagnosis was Altered mental status, unspecified altered mental status type. Diagnoses of Acute renal failure, unspecified acute renal failure type ([address] and Acute cystitis without hematuria were also pertinent to this visit. has a past medical history of Allergic, Anemia, Atrial fibrillation ([address] Breast cancer [facility]), Cardiac pacemaker in situ, Cellulitis, Diffuse pneumonia, Hypertension, Murmur, Osteoarthritis, and Sinus drainage. has a past surgical history that includes Mastectomy (Left);  Total knee arthroplasty; and hernia repair. Restrictions  Restrictions/Precautions  Restrictions/Precautions: Fall Risk, General Precautions  Implants present? : Pacemaker  Position Activity Restriction  Other position/activity restrictions: Avasys, up with assist; No BP Left, \"Purewick\", IV     Subjective   General  Chart Reviewed: Yes  Response To Previous Treatment: Patient with no complaints from previous session. Family / Caregiver Present: No  Referring Practitioner: Yusef Kelley, DO  Subjective  Subjective: Pt motioning therapist to come into the room, wants to have me remove IV. Pt agreed to get OOB to get off her back a minute but once to chait refused further trials of standing or walking yelling \"get off me\" when attempts were made. Pain Screening  Patient Currently in Pain: Denies  Vital Signs  Patient Currently in Pain: Denies            Objective   Bed mobility  Supine to Sit: Minimal assistance  Sit to Supine: Unable to assess  Transfers  Sit to Stand: Dependent/Total(min X 2)  Stand to sit: Dependent/Total(min X 2)  Stand Pivot Transfers: Dependent/Total(min X 2 using RW)  Ambulation  Ambulation?: Yes  Ambulation 1  Surface: level tile  Device: Rolling Walker  Assistance: Dependent/Total(minX 2)  Distance: 4-5 steps  Comments: Pt yelling \"let me sit down before I fall\"        Exercises  Hip Flexion: X 10 BLE seated marches  Knee Long Arc Quad: X 30 -40 kicking B legs at same time  Ankle Pumps: X 15 -20  BLE  Comments: Pt particpted in minimal exs not starting or stopping with cue sgiven but rather doing as she please for as long as she pleased. She then refused to do further exs or standing /gait trials.         AM-PAC Score  AM-PAC Inpatient Mobility Raw Score : 15 (10/13/20 1435)  AM-PAC Inpatient T-Scale Score : 39.45 (10/13/20 1435)  Mobility Inpatient CMS 0-100% Score: 57.7 (10/13/20 1435)  Mobility Inpatient CMS G-Code Modifier : CK (10/13/20 1435)          Goals  Short term goals  Time Frame for Short term goals: 10/12/20 unless noted  Short term goal 1: Pt will perform bed moiblity with SBA by 10/11/20; 10/13 Kayla  Short term goal 2: Pt will perform transfers with CGA; 10/13 Kayla  Short term goal 3: Pt will ambulate 50 ft with RW and CGA; 10/13 Kayla of 2 with RW 4-5 steps  Patient Goals   Patient goals : \"to go home\"    Plan    Times per week: 2-3x/week in acute care (can increase PT frequency if participation improves)  Current Treatment Recommendations: Strengthening, Neuromuscular Re-education, Home Exercise Program, ROM, Balance Training, Endurance Training, Functional Mobility Training, Transfer Training, Gait Training  Safety Devices:  All fall risk precautions in place, Call light within reach, Chair alarm in place, Gait belt, Left in chair, Nurse notified     Therapy Time   Individual Concurrent Group Co-treatment   Time In 1300         Time Out 1328         Minutes [address] [place], [place] [id number]

## 2020-10-13 NOTE — PROGRESS NOTES
Pt refuse ECHO, staff RN and charge and ECHO tech encouraged and explained to pt . Pt worried about IV sticks, pt couldn't comprehend the noninvasive nature of procedure, Echo tech said he would check later.

## 2020-10-13 NOTE — CARE COORDINATION
Therapy notes reviewed. recs for SNF. Call to patients elizabeth kuhn. Message left. CM spoke with patient. She stated she would like someplace close to home. Referral to [facility]. Waiting determination. Luis Angel Charles RN    Spoke with Anisha cMgee at [facility]. Can accept and will start precert today. Will need rapid covid day of discharge.  Luis Angel Charles RN

## 2020-10-13 NOTE — PROGRESS NOTES
[address]   Daily Cardiovascular Progress Note    Admit Date: 10/7/2020    Chief complaint: sob  HPI: no compalints      Medications/Labs all Reviewed:  Patient Active Problem List   Diagnosis    Colitis    Hypokalemia    Near syncope    Essential hypertension    Vertigo    Dizziness    CHF exacerbation (HCC)    Chronic diastolic congestive heart failure (HCC)    Chronic atrial fibrillation (HCC)    Pacemaker    Anticoagulation goal of INR 2 to 3    Physical debility    History of breast cancer    [facility]    Abdominal pain    Chest pain    Complete heart block (HCC)    Diffuse pneumonia    Acute UTI    Hypotension    Altered mental status    Bacteremia due to Gram-positive bacteria    Elevated troponin    PAF (paroxysmal atrial fibrillation) (HCC)    History of pacemaker       Medications:  potassium chloride 10 mEq/100 mL IVPB (Peripheral Line), PRN  polyethylene glycol (GLYCOLAX) packet 17 g, Daily  dextrose 5 % and 0.45 % NaCl with KCl 20 mEq infusion, Continuous  perflutren lipid microspheres (DEFINITY) injection 1.65 mg, ONCE PRN  cefTRIAXone (ROCEPHIN) 1 g IVPB in 50 mL D5W minibag, Q24H  metronidazole (FLAGYL) 500 mg in NaCl 100 mL IVPB premix, Q8H  enoxaparin (LOVENOX) injection 90 mg, Daily  pantoprazole (PROTONIX) injection 40 mg, Daily  [Held by provider] rivaroxaban (XARELTO) tablet 15 mg, Daily  miconazole (MICOTIN) 2 % powder, BID  acetaminophen (TYLENOL) tablet 650 mg, Q4H PRN  aspirin chewable tablet 81 mg, Daily  dilTIAZem (CARDIZEM CD) extended release capsule 120 mg, Daily  fluticasone (FLONASE) 50 MCG/ACT nasal spray 1 spray, Daily  rOPINIRole (REQUIP) tablet 0.5 mg, Daily  meclizine (ANTIVERT) tablet 25 mg, Daily PRN  lisinopril (PRINIVIL;ZESTRIL) tablet 10 mg, Daily  sodium chloride flush 0.9 % injection 10 mL, 2 times per day  sodium chloride flush 0.9 % injection 10 mL, PRN  acetaminophen (TYLENOL) tablet 650 mg, Q6H PRN TROPONINI 0.04* 0.03*       Cardiac Studies:          Assessment and Plan     1. Elevated Troponin: Flat, not c/w ACS  2. CP: Typical.  remains tender to palpation  3. Acute Chronic diastolic CHF              - Wt 177lbs last d/c home and today 155lbs. - appears stable and compensated. 4. Paroxysmal Atrial fib:              - On Xarelto              - S/p AV meek ablation:  5. Complete heart block:                       -2015 s/p pacer s/p  pacer Dependent,  6. HTN  7. PATITO: improved  8. UTI: per IM        PLAN  1. Echo still pending  2. CT w/o irritation to pacer site  3. Device interogation with possible fluid overload, weight down >20lbs, will await echo to assess before changing meds  4. Her chest pain is atypical however due to EKG unable to evaluate for ischemia. Awaiting echo  5. She is exquisitely tender to her pacemaker site which is unusual given its implant date. No s/s of infection        Pt follows with Dr. Cori Copeland    Patient Active Problem List   Diagnosis    Colitis    Hypokalemia    Near syncope    Essential hypertension    Vertigo    Dizziness    CHF exacerbation (HCC)    Chronic diastolic congestive heart failure (HCC)    Chronic atrial fibrillation (HCC)    Pacemaker    Anticoagulation goal of INR 2 to 3    Physical debility    History of breast cancer    Acute kidney injury ([address]    Abdominal pain    Chest pain    Complete heart block (HCC)    Diffuse pneumonia    Acute UTI    Hypotension    Altered mental status    Bacteremia due to Gram-positive bacteria    Elevated troponin    PAF (paroxysmal [facility] fibrillation) [facility]    History of pacemaker         Thank you for allowing me to participate in the care of your patient. Please call me with any questions [phone].       Mauricio Carbone MD, [address] [facility] Cardiologist  [address]  [phone] [facility]  [phone] [address] [place] [address]  10/13/2020 6:39 PM

## 2020-10-13 NOTE — PROGRESS NOTES
Occupational Therapy  Facility/Department: [facility] C3 TELE/MED SURG/ONC  Daily Treatment Note  NAME: Kristi Downey  : 1945  MRN: [id number]    Date of Service: 10/13/2020    Discharge Recommendations:  Subacute/Skilled Nursing Facility       Assessment   Performance deficits / Impairments: Decreased functional mobility ; Decreased ADL status; Decreased safe awareness;Decreased cognition;Decreased balance;Decreased endurance  OT Education: OT Role;Orientation  Patient Education: disease specific ed:  role of OT, benefits of activity  Activity Tolerance  Activity Tolerance: Treatment limited secondary to decreased cognition  Activity Tolerance: vitals stable on RA  Safety Devices  Safety Devices in place: Yes  Type of devices: Call light within reach; Chair alarm in place; Left in chair;Nurse notified         Patient Diagnosis(es): The primary encounter diagnosis was Altered mental status, unspecified altered mental status type. Diagnoses of Acute renal failure, unspecified acute renal failure type ([facility] and Acute cystitis without hematuria were also pertinent to this visit. has a past medical history of Allergic, Anemia, Atrial fibrillation ([facility] Breast cancer [facility]), Cardiac pacemaker in situ, Cellulitis, Diffuse pneumonia, Hypertension, Murmur, Osteoarthritis, and Sinus drainage. has a past surgical history that includes Mastectomy (Left); Total knee arthroplasty; and hernia repair.     Restrictions  Restrictions/Precautions  Restrictions/Precautions: Fall Risk, General Precautions  Implants present? : Pacemaker  Position Activity Restriction  Other position/activity restrictions: Avasys, up with assist; No BP Left, \"Purewick\", IV  Subjective   General  Chart Reviewed: Yes  Patient assessed for rehabilitation services?: Yes  Additional Pertinent Hx: hx breast ca with L mastectomy  Family / Caregiver Present: No  Referring Practitioner: Susan Luke  Diagnosis: acute UTI  Subjective  Subjective: multiple complaints  General Comment  Comments: RN cleared pt for OT; pt resting in bed, minimally agreeable to getting OOB to chair  Vital Signs  Patient Currently in Pain: Denies   Orientation  Orientation  Overall Orientation Status: Impaired  Orientation Level: Disoriented to situation;Disoriented to time  Objective    ADL  Feeding: Independent(drinking water from cup sitting up in chair, refusing to eat soft foods on tray, RN aware)  LE Dressing: Dependent/Total  Toileting: Dependent/Total(\"purewick)        Balance  Sitting Balance: Supervision  Standing Balance: Dependent/Total(Min assist of 2 with RW)  Standing Balance  Activity: bed-->chair  Functional Mobility  Functional Mobility Comments: FOSTER, refused further standing or walking to bathroom  Bed mobility  Supine to Sit: Moderate assistance  Sit to Supine: Unable to assess(Left up in chair upon exiting, RN notified)  Transfers  Stand Step Transfers: 2 Person assistance(with RW to Left bed-->chair)  Sit to stand: 2 Person assistance  Stand to sit: 2 Person assistance                       Cognition  Overall Cognitive Status: Exceptions  Arousal/Alertness: Delayed responses to stimuli  Following Commands: Inconsistently follows commands  Attention Span: Difficulty dividing attention  Memory: Decreased short term memory;Decreased recall of recent events;Decreased long term memory  Safety Judgement: Decreased awareness of need for safety  Insights: Decreased awareness of deficits  Initiation: Requires cues for all  Sequencing: Requires cues for some  Cognition Comment: confused, infantile speech/complaining          Type of ROM/Therapeutic Exercise: AAROM  Comment: sitting in chair  Hand flex/ext: x  10  Reps max encouragement     Plan   Plan  Times per week: 2-3x  Current Treatment Recommendations: Self-Care / ADL, Functional Mobility Training, Balance Training, Endurance Training, Safety Education & Training               AM-PAC Score  Self care score = 11; G-code = CL  Goals  Short term goals  Time Frame for Short term goals: 1 week (10/15) unless noted  Short term goal 1: Sit EOB x3 min with CGA in prep to transfer by 10/12; 10/13 STG met; New STG: min assist of 1 bed mobility  Short term goal 2: Perform functional transfer with min A with AD; 10/13 requiring min assist of 2 due to decreased cognition,refusing to get up OOB; continue 1 week  Short term goal 3: Perform 2 grooming tasks with SBA; 10/13 No change, barely feeds self, due to refusal to eat, can drink from cup independently, does not attend to grooming  Short term goal 4: Perform UE exer 15x each to improve endurance; 10/13 requiring max cues to stay on task to complete 5-10 reps BUE AROM exercises  Patient Goals   Patient goals : Pt did not state       Therapy Time   Individual Concurrent Group Co-treatment   Time In 1300         Time Out 1327         Minutes [address] [place], [place]

## 2020-10-13 NOTE — PROGRESS NOTES
Pt assessment completed and charted. VSS. Pt a/o to self only. Short term memory loss. Pt refusing meds; refusing to eat. Pt complete Q2T. Refuses turns @ times. Pt calls out excessively. Bed in lowest position and wheels locked. Call light within reach. Bedside table within reach. Non-skid footwear in place. Pt denies any other needs at this time. Avasys in place. Bed alarm in place. Will continue to monitor.

## 2020-10-13 NOTE — PLAN OF CARE
Problem: Falls - Risk of:  Goal: Will remain free from falls  Description: Will remain free from falls  Outcome: Ongoing   Pt remains free from falls during this shift. . Bed in lowest position and wheels locked. Call light within reach. Bedside table within reach. Pt needs reinforced education. Avasys and bed check in place. Will continue to monitor.

## 2020-10-13 NOTE — DISCHARGE INSTR - COC
Continuity of Care Form    Patient Name: Spencer Alvarez   :  1945  MRN:  [id number]    Admit date:  10/7/2020  Discharge date:  10/17/2020     Code Status Order: Full Code   Advance Directives:   [facility] Documentation       Date/Time Healthcare Directive Type of Healthcare Directive Copy in [address] Agent's Name Healthcare Agent's Phone Number    10/07/20 3307  Unknown, patient unable to respond due to medical condition -- -- -- -- --            Admitting Physician:  James Encinas DO  PCP: Langston Meckel, MD    Discharging Nurse: Chantal Neff RN  [address] Unit/Room#: 9434/6603-09  Discharging Unit Phone Number: [phone]    Emergency Contact:   Extended Emergency Contact Information  Primary Emergency Contact: [facility] [address]  Phone: [phone]  Relation: Child  Secondary Emergency Contact: Talia Josué, [address] [facility] [address] [facility] Phone: [phone]  Relation: Child    Past Surgical History:  Past Surgical History:   Procedure Laterality Date    HERNIA REPAIR      MASTECTOMY Left     TOTAL KNEE ARTHROPLASTY      bilateral.       Immunization History:   Immunization History   Administered Date(s) Administered    Influenza Virus Vaccine 10/05/2015    Influenza, Quadv, IM, PF (6 mo and older Fluzone, Flulaval, Fluarix, and 3 yrs and older Afluria) 2017    Pneumococcal Conjugate 13-valent (Benjamine Cruz) 2017    Tdap (Boostrix, Adacel) 2016       Active Problems:  Patient Active Problem List   Diagnosis Code    Colitis K52.9    Hypokalemia E87.6    Near syncope R55    Essential hypertension I10    Vertigo R42    Dizziness R42    CHF exacerbation ([facility] I50.9    Chronic diastolic congestive heart failure (HCC) I50.32    Chronic atrial fibrillation ([facility] I48.20    Pacemaker Z95.0    Anticoagulation goal of INR 2 to 3 Z51.81, Z79.01    Physical debility R53.81    History of breast cancer Z85.3    Acute kidney injury ([address] N17.9    Abdominal pain R10.9    Chest pain R07.9    Complete heart block (HCC) I44.2    Diffuse pneumonia J18.0    Acute UTI N39.0    Hypotension I95.9    Altered mental status R41.82    Bacteremia due to Gram-positive bacteria R78.81    Elevated troponin R77.8    PAF (paroxysmal atrial fibrillation) (HCC) I48.0    History of pacemaker Z95.0       Isolation/Infection:   Isolation            No Isolation          Patient Infection Status       Infection Onset Added Last Indicated Last Indicated By Review Planned Expiration Resolved Resolved By    None active    Resolved    Clostridium perfringens 10/07/20 10/10/20 10/07/20 Culture, Blood 1   10/11/20 Chidi Gupta RN            Nurse Assessment:  Last Vital Signs: /63   Pulse 61   Temp 97.9 °F (36.6 °C) (Oral)   Resp 16   Ht 5' 4\" (1.626 m)   Wt 157 lb 10.1 oz (71.5 kg)   SpO2 99%   BMI 27.06 kg/m²     Last documented pain score (0-10 scale): Pain Level: 0  Last Weight:   Wt Readings from Last 1 Encounters:   10/13/20 157 lb 10.1 oz (71.5 kg)     Mental Status:  oriented, alert and to person and place    IV Access:  - None    Nursing Mobility/ADLs:  Walking   Dependent  Transfer  Dependent  Bathing  Dependent  Dressing  Dependent  Toileting  Dependent  Feeding  Independent  Med Admin  Assisted  Med Delivery   whole    Wound Care Documentation and Therapy:        Elimination:  Continence:   · Bowel: No  · Bladder: No  Urinary Catheter: None   Colostomy/Ileostomy/Ileal Conduit: No       Date of Last BM: 10/17/2020    Intake/Output Summary (Last 24 hours) at 10/13/2020 1641  Last data filed at 10/13/2020 1506  Gross per 24 hour   Intake 2003 ml   Output 1400 ml   Net 603 ml     I/O last 3 completed shifts: In: 2003 [P.O.:180; I.V.:1723; IV Piggyback:100]  Out: 1000 [Urine:1000]    Safety Concerns:      At Risk for Falls and Aspiration Risk    Impairments/Disabilities: None    Nutrition Therapy:  Current Nutrition Therapy:   - Oral Diet:  Dental Soft    Routes of Feeding: Oral  Liquids: No Restrictions  Daily Fluid Restriction: no  Last Modified Barium Swallow with Video (Video Swallowing Test): not done    Treatments at the Time of Hospital Discharge:   Respiratory Treatments:   albuterol (PROVENTIL) nebulizer solution 2.5 mg  :  Dose 2.5 mg  :  Nebulization  :  EVERY 4 HOURS PRN  :  Shortness of Breath  :          fluticasone (FLONASE) 50 MCG/ACT nasal spray 1 spray  :  Dose 1 spray  :  Nasal  :  DAILY            Oxygen Therapy:  is not on home oxygen therapy. Ventilator:    - No ventilator support    Rehab Therapies: Physical Therapy and Occupational Therapy  Weight Bearing Status/Restrictions: No weight bearing restirctions  Other Medical Equipment (for information only, NOT a DME order):  hospital bed  Other Treatments: complete care    Patient's personal belongings (please select all that are sent with patient):  None    RN SIGNATURE:  Electronically signed by Marleen Sandhoff, RN on 10/17/20 at 3:55 PM EDT    CASE MANAGEMENT/SOCIAL WORK SECTION    Inpatient Status Date: 10/13/20    Readmission Risk Assessment Score:  Readmission Risk              Risk of Unplanned Readmission:        17           Discharging to Facility/ Agency   · Name: [facility]  · Address:  · Phone:[phone]  · DPQ:[phone]    ·     / signature: Electronically signed by Pamela Vale RN on 10/17/20 at 10:32 AM EDT    PHYSICIAN SECTION    Prognosis: Fair    Condition at Discharge: Stable    Rehab Potential (if transferring to Rehab): Fair    Recommended Labs or Other Treatments After Discharge:   Recommended Follow-up, Labs or Other Treatments After Discharge:     Follow up with PCP  Continue potassium repletion as outpatient   Will be on PO Flagyl upon discharge  PO Vancomycin QID x10 days  Follow up with cardiology as outpatient in 1 month         Physician Certification: I certify the above information and transfer of Tess Dumont  is necessary for the continuing treatment of the diagnosis listed and that she requires LTAC for greater 30 days.      Update Admission H&P: No change in H&P    PHYSICIAN SIGNATURE:  Electronically signed by Dorene Griggs DO on 10/17/20 at 9:54 AM EDT

## 2020-10-13 NOTE — CARE COORDINATION
Chart reviewed day 6. Currently patient is refusing Echo and therapy. Confused. Which are BARRIERS to DCP. Attempted to call daughter, Brendan Rang. No answer and no answering machine. Per previous conversation with Dahlia Gilbert, patient was active and participatory in homemaking PTA. Angeles Enamorado RN

## 2020-10-13 NOTE — PROGRESS NOTES
Inpatient Family Medicine Progress Note      PCP: Roman Matute MD    Date of Admission: 10/7/2020    [address] Course:   66 y. o. female who presented to [facility] with AMS.  Patient is unable to give much information, but son was available to give story. Patient was showing fatigue and AMS.  Also was eating less food. Went to doctor on 9/29 and was found to have PATITO.  Wasn't able to give a urine at that visit.  Mental status continued to deteriorate over the past few days and was unable to take care of herself.  Has abdominal pain and some back pain.  Also fatigue.  No fever/chills.  No chest pain.  A little shortness of breath.  No N/V/D.  Is unsure of if there was any pain with urination or blood in her urine.  Keeps saying \"I don't know\" and asks \"Can I move? \"     While in the ED, patient was consistently uncomfortable on her bed.  Labwork showed significant PATITO with Creatitine of 3.8 and GFR of 11.  Mild AST, bilirubin, lipase elevation.  Elevated WBC of 11.8.  UA significant for likely UTI.  No acute changes on head CT.  Abdominal CT showed gallstones but no other acute findings.  Because of patient's intolerance to multiple medications int he past, ED consulted pharmacy and decided to start Aztreonam to treat UTI.  Patient admitted to hospital for further care. Aztreonam decreased to 250mg BID due to low GFR. PT/OT ordered. Pt looked dry so fluids increased from 50ml/hr to 100ml/hr. One blood culture came back positive for gram positive rods, so started on Vanc which will be monitored by Pharmacy. ID consulted.  With improvement in renal function patient's aztreonam dose increased to 500mg TID.  Patient was checked for reversible causes of delirium including TSH, Iron studies, B1, B12, Folate, RPR. Patient did start improving in mentation on hospital day 4, demonstrated by increased alertness and becoming oriented to self and place.   Patient continued to refuse PO medications and PT/OT evaluation for discharge recommendations. Subjective: Patient is having increased abdominal pain. Nursing reports no documented bowel movements. Patient refusing PO medications. Medications:  Reviewed    Infusion Medications    sodium chloride 75 mL/hr at 10/13/20 0900     Scheduled Medications    potassium bicarb-citric acid  40 mEq Oral Daily    polyethylene glycol  17 g Oral Daily    cefTRIAXone (ROCEPHIN) IV  1 g Intravenous Q24H    metroNIDAZOLE  500 mg Intravenous Q8H    enoxaparin  1.5 mg/kg (Adjusted) Subcutaneous Daily    pantoprazole  40 mg Intravenous Daily    [Held by provider] rivaroxaban  15 mg Oral Daily    miconazole   Topical BID    aspirin  81 mg Oral Daily    dilTIAZem  120 mg Oral Daily    fluticasone  1 spray Nasal Daily    rOPINIRole  0.5 mg Oral Daily    lisinopril  10 mg Oral Daily    sodium chloride flush  10 mL Intravenous 2 times per day     PRN Meds: potassium chloride, perflutren lipid microspheres, acetaminophen, meclizine, sodium chloride flush, acetaminophen **OR** acetaminophen, polyethylene glycol, ondansetron, albuterol      Intake/Output Summary (Last 24 hours) at 10/13/2020 1115  Last data filed at 10/13/2020 0626  Gross per 24 hour   Intake 3233 ml   Output 1500 ml   Net 1733 ml       Physical Exam Performed:    /75   Pulse 61   Temp 97.7 °F (36.5 °C) (Oral)   Resp 16   Ht 5' 4\" (1.626 m)   Wt 157 lb 10.1 oz (71.5 kg)   SpO2 97%   BMI 27.06 kg/m²     General appearance:  Pt appeared in distress and oriented to self and place.    HEENT:  Normal cephalic, atraumatic without obvious deformity. Neck: Supple. No jugular venous distention. Trachea midline. Respiratory:  Normal respiratory effort. CTABL, without Rales/Wheezes/Rhonchi. Cardiovascular:  Irregularly irregular rate and rhythm with normal S1/S2.  Chest wall severely tender to palpation.   Abdomen: +BS, non-distended, soft, generalized tenderness worst in epigastrium. Musculoskeletal:  No clubbing, cyanosis or edema bilaterally. Skin: Warm and dry  Neurologic:  No focal deficits, no gross sensory,motor deficits  Psychiatric:  Alert but not oriented. Pt says she knows she is in the hospital.  Capillary Refill: Brisk,< 3 seconds   Peripheral Pulses: +2 palpable, equal bilaterally     Labs:   Recent Labs     10/11/20  0537 10/12/20  0636 10/13/20  0639   WBC 6.2 6.5 7.1   HGB 11.3* 11.5* 11.8*   HCT 35.5* 35.4* 36.4    148 148     Recent Labs     10/12/20  1540 10/13/20  0639    138   K 3.7 3.2*    108   CO2 18* 19*   BUN 6* 5*   CREATININE 0.6 0.6   CALCIUM 8.1* 8.1*     No results for input(s): AST, ALT, BILIDIR, BILITOT, ALKPHOS in the last 72 hours. No results for input(s): INR in the last 72 hours. Recent Labs     10/11/20  0537 10/11/20  1105   TROPONINI 0.04* 0.03*       Urinalysis:      Lab Results   Component Value Date    NITRU Negative 10/07/2020    WBCUA >100 10/07/2020    BACTERIA 4+ 10/07/2020    RBCUA see below 10/07/2020    BLOODU SMALL 10/07/2020    SPECGRAV 1.025 10/07/2020    GLUCOSEU Negative 10/07/2020    GLUCOSEU NEGATIVE 04/24/2012       Radiology:  CT CHEST W CONTRAST   Final Result   1. No acute inflammatory changes surrounding the pacer pack in the anterior   chest wall on the left. No focal fluid collection or abnormal enhancement. 2. Small bilateral pleural effusions with dependent lower lobe atelectasis. 3. Moderate-sized hiatal hernia. CT HEAD WO CONTRAST   Final Result   No acute intracranial abnormality. Atrophy more pronounced in the posterior fossa, unchanged. CT ABDOMEN PELVIS WO CONTRAST Additional Contrast? None   Final Result   No acute findings. Gallstones. XR CHEST PORTABLE   Final Result   Negative portable chest x-ray.              Assessment/Plan:    Active Hospital Problems    Diagnosis Date Noted    Elevated troponin [R77.8]     PAF (paroxysmal atrial fibrillation) ([facility] [address] [I48.0]     History of pacemaker [Z95.0]     Bacteremia due to Gram-positive bacteria [R78.81] 10/09/2020    Acute UTI [N39.0] 10/07/2020    Hypotension [I95.9] 10/07/2020    Altered mental status [R41.82] 10/07/2020    Acute kidney injury ([facility] [address] [N17.9] 05/10/2016     1. AMS possibly 2/2 Acute Cystitis and sepsis, but if not responding to abx treatment may need dementia workup. -TSH, RPR, b12, folate, and iron studies noncontributory. B1 pending.  - Finished course of IV vanc and aztreonam.  Switched to Metronidazole and Ceftriaxone for GI pathogens. - Urine culture - e coli  - Blood culture positive - clostridium perfringens  -Encouraged nutrition and working with PT/OT. Patient refusing PT/OT, cont'd encouragement.    -Fortified Nutrition supplement     2. Sepsis with gram positive khurram bacteremia, ID showing Clostridium perfringens, white count responding to vanc.  Questionable allergy to cephalosporins and patient refusing PO.   - Infectious disease consult.  Abx per ID.   - 1000 mL bolus NS given in ED.  - Monitor vitals and labs during visit. - Currently on 100 mL/hr of NS.  - Watch for signs of fluid overload  - Mentation improving, patient more alert today.     3. UTI:  -See AMS     4. Atypical Chest Pain:  Worsened by palpation  -Repeat EKG without acute ischemic changes, peaked T-waves in anterolateral leads have improved. -Troponin elevated at 0.04, will trend and consult cardiology. Alberto Osman likely 2/2 demand ischemia from sepsis. -Cardiology consulted. Patient refusing echo, CT chest without source of infection.     5. Acute Kidney Injury (Resolved) Creatinine 0.6.     6. Hypotension resolved with sepsis treatment.   -Monitor for fluid overload    DVT Prophylaxis: Lovenox 1.5 mg/kg/qd, SCDs  Diet: DIET DENTAL SOFT;  Dietary Nutrition Supplements: Frozen Oral Supplement  Dietary Nutrition Supplements: Other Oral Supplement (see comment)  Code Status: Full Code  PT/OT Eval Status: Refused  Dispo-Pending improvement. This patient was seen and evaluated with the resident team and with the attending, Dr. Dmitry Christopher.     (Please note that portions of this note were completed with a voice recognition program.  Efforts were made to edit the dictations but occasionally words are mis-transcribed.)    Dunia Bolden, DO  Family Medicine Resident PGY2

## 2020-10-14 ENCOUNTER — APPOINTMENT (OUTPATIENT)
Dept: ULTRASOUND IMAGING | Age: 75
DRG: 871 | End: 2020-10-14
Payer: COMMERCIAL

## 2020-10-14 LAB
ANION GAP SERPL CALCULATED.3IONS-SCNC: 10 MMOL/L (ref 3–16)
BASOPHILS ABSOLUTE: 0.1 K/UL (ref 0–0.2)
BASOPHILS RELATIVE PERCENT: 1.3 %
BUN BLDV-MCNC: 4 MG/DL (ref 7–20)
CALCIUM SERPL-MCNC: 8.1 MG/DL (ref 8.3–10.6)
CHLORIDE BLD-SCNC: 110 MMOL/L (ref 99–110)
CO2: 20 MMOL/L (ref 21–32)
CREAT SERPL-MCNC: 0.6 MG/DL (ref 0.6–1.2)
EOSINOPHILS ABSOLUTE: 0.2 K/UL (ref 0–0.6)
EOSINOPHILS RELATIVE PERCENT: 3.1 %
GFR AFRICAN AMERICAN: >60
GFR NON-AFRICAN AMERICAN: >60
GLUCOSE BLD-MCNC: 122 MG/DL (ref 70–99)
HCT VFR BLD CALC: 35.4 % (ref 36–48)
HEMOGLOBIN: 11.3 G/DL (ref 12–16)
LYMPHOCYTES ABSOLUTE: 1.3 K/UL (ref 1–5.1)
LYMPHOCYTES RELATIVE PERCENT: 21.3 %
MAGNESIUM: 1.8 MG/DL (ref 1.8–2.4)
MCH RBC QN AUTO: 24.9 PG (ref 26–34)
MCHC RBC AUTO-ENTMCNC: 31.9 G/DL (ref 31–36)
MCV RBC AUTO: 78 FL (ref 80–100)
MONOCYTES ABSOLUTE: 0.6 K/UL (ref 0–1.3)
MONOCYTES RELATIVE PERCENT: 9.1 %
NEUTROPHILS ABSOLUTE: 4.1 K/UL (ref 1.7–7.7)
NEUTROPHILS RELATIVE PERCENT: 65.2 %
PDW BLD-RTO: 22.4 % (ref 12.4–15.4)
PLATELET # BLD: 151 K/UL (ref 135–450)
PMV BLD AUTO: 8.8 FL (ref 5–10.5)
POTASSIUM REFLEX MAGNESIUM: 3.6 MMOL/L (ref 3.5–5.1)
RBC # BLD: 4.54 M/UL (ref 4–5.2)
SODIUM BLD-SCNC: 140 MMOL/L (ref 136–145)
WBC # BLD: 6.3 K/UL (ref 4–11)

## 2020-10-14 PROCEDURE — 2580000003 HC RX 258

## 2020-10-14 PROCEDURE — 6370000000 HC RX 637 (ALT 250 FOR IP): Performed by: STUDENT IN AN ORGANIZED HEALTH CARE EDUCATION/TRAINING PROGRAM

## 2020-10-14 PROCEDURE — 93308 TTE F-UP OR LMTD: CPT

## 2020-10-14 PROCEDURE — 83735 ASSAY OF MAGNESIUM: CPT

## 2020-10-14 PROCEDURE — 6360000002 HC RX W HCPCS: Performed by: STUDENT IN AN ORGANIZED HEALTH CARE EDUCATION/TRAINING PROGRAM

## 2020-10-14 PROCEDURE — 76705 ECHO EXAM OF ABDOMEN: CPT

## 2020-10-14 PROCEDURE — 36415 COLL VENOUS BLD VENIPUNCTURE: CPT

## 2020-10-14 PROCEDURE — 6360000002 HC RX W HCPCS

## 2020-10-14 PROCEDURE — 99233 SBSQ HOSP IP/OBS HIGH 50: CPT | Performed by: NURSE PRACTITIONER

## 2020-10-14 PROCEDURE — 2500000003 HC RX 250 WO HCPCS: Performed by: STUDENT IN AN ORGANIZED HEALTH CARE EDUCATION/TRAINING PROGRAM

## 2020-10-14 PROCEDURE — 85025 COMPLETE CBC W/AUTO DIFF WBC: CPT

## 2020-10-14 PROCEDURE — C9113 INJ PANTOPRAZOLE SODIUM, VIA: HCPCS | Performed by: STUDENT IN AN ORGANIZED HEALTH CARE EDUCATION/TRAINING PROGRAM

## 2020-10-14 PROCEDURE — 2500000003 HC RX 250 WO HCPCS

## 2020-10-14 PROCEDURE — 1200000000 HC SEMI PRIVATE

## 2020-10-14 PROCEDURE — 80048 BASIC METABOLIC PNL TOTAL CA: CPT

## 2020-10-14 PROCEDURE — 2500000003 HC RX 250 WO HCPCS: Performed by: INTERNAL MEDICINE

## 2020-10-14 RX ORDER — MAGNESIUM SULFATE 1 G/100ML
1 INJECTION INTRAVENOUS ONCE
Status: COMPLETED | OUTPATIENT
Start: 2020-10-14 | End: 2020-10-14

## 2020-10-14 RX ORDER — CEFUROXIME AXETIL 250 MG/1
500 TABLET ORAL EVERY 12 HOURS SCHEDULED
Status: DISCONTINUED | OUTPATIENT
Start: 2020-10-15 | End: 2020-10-17

## 2020-10-14 RX ORDER — CHOLECALCIFEROL (VITAMIN D3) 125 MCG
5 CAPSULE ORAL NIGHTLY PRN
Status: DISCONTINUED | OUTPATIENT
Start: 2020-10-14 | End: 2020-10-17 | Stop reason: HOSPADM

## 2020-10-14 RX ORDER — METRONIDAZOLE 250 MG/1
500 TABLET ORAL EVERY 8 HOURS SCHEDULED
Status: DISCONTINUED | OUTPATIENT
Start: 2020-10-15 | End: 2020-10-17 | Stop reason: HOSPADM

## 2020-10-14 RX ORDER — KETOROLAC TROMETHAMINE 30 MG/ML
15 INJECTION, SOLUTION INTRAMUSCULAR; INTRAVENOUS ONCE
Status: COMPLETED | OUTPATIENT
Start: 2020-10-14 | End: 2020-10-14

## 2020-10-14 RX ADMIN — MICONAZOLE NITRATE: 20 POWDER TOPICAL at 09:12

## 2020-10-14 RX ADMIN — CEFTRIAXONE SODIUM 1 G: 1 INJECTION, POWDER, FOR SOLUTION INTRAMUSCULAR; INTRAVENOUS at 20:05

## 2020-10-14 RX ADMIN — METRONIDAZOLE 500 MG: 500 INJECTION, SOLUTION INTRAVENOUS at 10:27

## 2020-10-14 RX ADMIN — KETOROLAC TROMETHAMINE 15 MG: 30 INJECTION, SOLUTION INTRAMUSCULAR at 04:21

## 2020-10-14 RX ADMIN — METRONIDAZOLE 500 MG: 500 INJECTION, SOLUTION INTRAVENOUS at 18:42

## 2020-10-14 RX ADMIN — MELATONIN TAB 5 MG 5 MG: 5 TAB at 20:06

## 2020-10-14 RX ADMIN — PANTOPRAZOLE SODIUM 40 MG: 40 INJECTION, POWDER, FOR SOLUTION INTRAVENOUS at 09:53

## 2020-10-14 RX ADMIN — MAGNESIUM SULFATE HEPTAHYDRATE 1 G: 1 INJECTION, SOLUTION INTRAVENOUS at 10:27

## 2020-10-14 RX ADMIN — POTASSIUM CHLORIDE, DEXTROSE MONOHYDRATE AND SODIUM CHLORIDE: 150; 5; 900 INJECTION, SOLUTION INTRAVENOUS at 10:36

## 2020-10-14 RX ADMIN — RIVAROXABAN 15 MG: 15 TABLET, FILM COATED ORAL at 18:42

## 2020-10-14 RX ADMIN — MICONAZOLE NITRATE: 20 POWDER TOPICAL at 20:06

## 2020-10-14 RX ADMIN — METRONIDAZOLE 500 MG: 500 INJECTION, SOLUTION INTRAVENOUS at 01:54

## 2020-10-14 ASSESSMENT — PAIN SCALES - GENERAL: PAINLEVEL_OUTOF10: 10

## 2020-10-14 NOTE — PROGRESS NOTES
Perfect serve message sent to Dr. Majo Alvarez, \"Pt very anxious and agitated, restless in bed. Pt has not slept all evening. Pt complaining of pain all over but will not take anything orally and will not take rectal Tylenol. Pt stated \"she cant sleep because her IV will swell up\". Pt is oriented to place and self. Is there anything we could give her iv for pain or anxiety? \", awaiting response.

## 2020-10-14 NOTE — PROGRESS NOTES
Pt assessment completed and charted. VSS. Pt a/o to person and place. Pt disoriented to time and situation. Pt Q2/hr turn. Pillow support in place. pure wick changed, santiago care preformed, and pt dry at this time. Pt denies any other needs at this time. Pt calls out appropriately. Will continue to monitor. Pt is a fall risk;  -Bed in lowest position and wheels locked. -Call light within reach.   -Bedside table within reach.   -Non-skid footwear in place.  -bed check and avasys in place.

## 2020-10-14 NOTE — PROGRESS NOTES
[facility]   Daily Progress Note    Admit Date:  10/7/2020  HPI:    Chief Complaint   Patient presents with    Altered Mental Status     hasn't been eating/drinking x 2-3 days per family; hasn't been urinating per family/EMS. Spoke to patient's son, Mara Martinez. Patient has had a steady decline in mental status for 2 weeks, dizziness, decreased PO intake, weight loss. No hx of dementia, patient normally can perform ADLs independently and has not been able to do so.  Abnormal Lab     mild renal failure on bloodwork done Tuesday at PCP, appt with nephrologist on 10/19. Presented with chest pain but difficult historian, altered mental status, poor po intake. Found to have PATITO, treated with IV fluids and UTI. Cardiology consulted for chest pain, elevated troponins. Hx of dCHF, PAF s/p RFCA on Xarelto, CHB s/p dual PPM, HTN. Subjective:  Ms. Mishra Born awake, alert, complains of cold, oriented to person and place, unclear if oriented to day or time. Focused on tv channel. Appetite poor. Objective:   /72   Pulse 60   Temp 97.8 °F (36.6 °C) (Oral)   Resp 18   Ht 5' 4\" (1.626 m)   Wt 165 lb 9.1 oz (75.1 kg)   SpO2 97%   BMI 28.42 kg/m²       Intake/Output Summary (Last 24 hours) at 10/14/2020 1156  Last data filed at 10/14/2020 0908  Gross per 24 hour   Intake 2508 ml   Output 400 ml   Net 2108 ml     Wt Readings from Last 3 Encounters:   10/14/20 165 lb 9.1 oz (75.1 kg)   07/31/19 172 lb (78 kg)   07/10/19 172 lb (78 kg)           ASSESSMENT:   1. Chest pain - tender to palpation, atypical of angina, less tender today  2. Elevated troponin - no rise or fall, not consistent with ACS  3. CHF, chronic systolic - weight down > 20 lbs since last OV, echo with increased filling pressures, device check with decreased thoracic impedence consistent with increased fluid index  4. HTN - refusing medications due to AMS  5.  PAF- current underlying atrial flutter with ventricular pacing, anticoagulation held  6. CHB - s/p dual PPM  7. PATITO - resolved      PLAN:  1. Resume po meds when patient agreeable to take (has been refusing)  2. Recommend stopping IV fluids given signs of fluid overload  3. Monitor closely for decompensation and PATITO  4. If no procedures planned, resume anticoagulation   5. Will follow along    Andrewcosmo MARIOLA Alcaraz CNP, 10/14/2020, 11:56 AM  [facility]   [phone]       Telemetry: A flutter, V paced 60's  NYHA: III    Physical Exam:  General:  Awake, alert, NAD  Skin:  Warm and dry  Neck:  JVP 8-9 cm  Chest:  Clear to auscultation, able to palpate around pacer site without patient wincing, no redness, warmth, bogginess to site. Cardiovascular:  RRR, normal X4E3, + systolic murmur, no g/r  Abdomen:  Soft, nontender, +bowel sounds  Extremities:  No BLE edema        Medications:    polyethylene glycol  17 g Oral Daily    cefTRIAXone (ROCEPHIN) IV  1 g Intravenous Q24H    metroNIDAZOLE  500 mg Intravenous Q8H    enoxaparin  1.5 mg/kg (Adjusted) Subcutaneous Daily    pantoprazole  40 mg Intravenous Daily    [Held by provider] rivaroxaban  15 mg Oral Daily    miconazole   Topical BID    aspirin  81 mg Oral Daily    dilTIAZem  120 mg Oral Daily    fluticasone  1 spray Nasal Daily    rOPINIRole  0.5 mg Oral Daily    lisinopril  10 mg Oral Daily    sodium chloride flush  10 mL Intravenous 2 times per day      dextrose 5% and 0.9% NaCl with KCl 20 mEq 75 mL/hr at 10/14/20 1036       Lab Data: Lab results independently reviewed by myself 10/14/20   CBC:   Recent Labs     10/12/20  0636 10/13/20  0639 10/14/20  0556   WBC 6.5 7.1 6.3   HGB 11.5* 11.8* 11.3*    148 151     BMP:    Recent Labs     10/12/20  1540 10/13/20  0639 10/14/20  0556    138 140   K 3.7 3.2* 3.6   CO2 18* 19* 20*   BUN 6* 5* 4*   CREATININE 0.6 0.6 0.6     INR:  No results for input(s): INR in the last 72 hours.   BNP:  No results for input(s): PROBNP in the last 72 hours. Cardiac Enzymes: No results for input(s): TROPONINI in the last 72 hours. Lipids:   Lab Results   Component Value Date    TRIG 63 03/15/2018    TRIG 73 02/13/2017    HDL 72 03/15/2018    HDL 62 02/13/2017    LDLCALC 111 03/15/2018    LDLCALC 82 02/13/2017       Cardiac Imaging:   ECHO 10/14/20:   Summary   Limited only f/u for LVEF and pacer wire infection. Normal left ventricular systolic function with ejection fraction of 55-60%. No regional wall motion abnormalites are seen. Compared to previous study from 5- no changes noted in left ventricular function. Moderate tricuspid regurgitation. Systolic pulmonic artery pressure (SPAP) is estimated at 53 mmHg consistent with mild pulmonary hypertension (Right atrial pressure of mmHg). Pacemaker / ICD lead is visualized in the right atrium. No mass or thrombus noted on leads, consider ARTHUR if further assessment is needed. Echo: 5/2019   Normal left ventricular systolic function with a visually estimated ejection   fraction of 55%. No regional wall motion abnormalities are seen. Normal left ventricular diastolic filling pressure. Bi-atrial enlargement. Mild mitral and tricuspid regurgitation. Systolic pulmonary artery pressure (SPAP) is normal and estimated at 28 mmHg   (right atrial pressure 8 mmHg).

## 2020-10-14 NOTE — PROGRESS NOTES
Pt alert and oriented to person and place. Pt still experiencing some confusion, refusing most medication, and not wanting to eat. Pt able to take xarelto tonight. Will continue to monitor.

## 2020-10-15 LAB
ANION GAP SERPL CALCULATED.3IONS-SCNC: 9 MMOL/L (ref 3–16)
BASOPHILS ABSOLUTE: 0.1 K/UL (ref 0–0.2)
BASOPHILS RELATIVE PERCENT: 1.2 %
BUN BLDV-MCNC: 3 MG/DL (ref 7–20)
CALCIUM SERPL-MCNC: 8.4 MG/DL (ref 8.3–10.6)
CHLORIDE BLD-SCNC: 109 MMOL/L (ref 99–110)
CO2: 20 MMOL/L (ref 21–32)
CREAT SERPL-MCNC: 0.6 MG/DL (ref 0.6–1.2)
EOSINOPHILS ABSOLUTE: 0.3 K/UL (ref 0–0.6)
EOSINOPHILS RELATIVE PERCENT: 4.8 %
GFR AFRICAN AMERICAN: >60
GFR NON-AFRICAN AMERICAN: >60
GLUCOSE BLD-MCNC: 106 MG/DL (ref 70–99)
HCT VFR BLD CALC: 34 % (ref 36–48)
HEMOGLOBIN: 11.1 G/DL (ref 12–16)
LYMPHOCYTES ABSOLUTE: 1.4 K/UL (ref 1–5.1)
LYMPHOCYTES RELATIVE PERCENT: 21.6 %
MAGNESIUM: 1.8 MG/DL (ref 1.8–2.4)
MAGNESIUM: 2.5 MG/DL (ref 1.8–2.4)
MCH RBC QN AUTO: 25.2 PG (ref 26–34)
MCHC RBC AUTO-ENTMCNC: 32.6 G/DL (ref 31–36)
MCV RBC AUTO: 77.5 FL (ref 80–100)
MONOCYTES ABSOLUTE: 0.6 K/UL (ref 0–1.3)
MONOCYTES RELATIVE PERCENT: 8.6 %
NEUTROPHILS ABSOLUTE: 4.1 K/UL (ref 1.7–7.7)
NEUTROPHILS RELATIVE PERCENT: 63.8 %
PDW BLD-RTO: 22.7 % (ref 12.4–15.4)
PLATELET # BLD: 156 K/UL (ref 135–450)
PMV BLD AUTO: 8.7 FL (ref 5–10.5)
POTASSIUM REFLEX MAGNESIUM: 3.4 MMOL/L (ref 3.5–5.1)
POTASSIUM REFLEX MAGNESIUM: 3.4 MMOL/L (ref 3.5–5.1)
RBC # BLD: 4.39 M/UL (ref 4–5.2)
SODIUM BLD-SCNC: 138 MMOL/L (ref 136–145)
VITAMIN B1, PLASMA: <2 NMOL/L (ref 4–15)
WBC # BLD: 6.4 K/UL (ref 4–11)

## 2020-10-15 PROCEDURE — 6360000002 HC RX W HCPCS: Performed by: STUDENT IN AN ORGANIZED HEALTH CARE EDUCATION/TRAINING PROGRAM

## 2020-10-15 PROCEDURE — 99232 SBSQ HOSP IP/OBS MODERATE 35: CPT | Performed by: INTERNAL MEDICINE

## 2020-10-15 PROCEDURE — 2580000003 HC RX 258: Performed by: STUDENT IN AN ORGANIZED HEALTH CARE EDUCATION/TRAINING PROGRAM

## 2020-10-15 PROCEDURE — 6370000000 HC RX 637 (ALT 250 FOR IP)

## 2020-10-15 PROCEDURE — 6370000000 HC RX 637 (ALT 250 FOR IP): Performed by: STUDENT IN AN ORGANIZED HEALTH CARE EDUCATION/TRAINING PROGRAM

## 2020-10-15 PROCEDURE — 1200000000 HC SEMI PRIVATE

## 2020-10-15 PROCEDURE — 80048 BASIC METABOLIC PNL TOTAL CA: CPT

## 2020-10-15 PROCEDURE — 83735 ASSAY OF MAGNESIUM: CPT

## 2020-10-15 PROCEDURE — 84132 ASSAY OF SERUM POTASSIUM: CPT

## 2020-10-15 PROCEDURE — 99232 SBSQ HOSP IP/OBS MODERATE 35: CPT | Performed by: NURSE PRACTITIONER

## 2020-10-15 PROCEDURE — 85025 COMPLETE CBC W/AUTO DIFF WBC: CPT

## 2020-10-15 PROCEDURE — 36415 COLL VENOUS BLD VENIPUNCTURE: CPT

## 2020-10-15 PROCEDURE — C9113 INJ PANTOPRAZOLE SODIUM, VIA: HCPCS | Performed by: STUDENT IN AN ORGANIZED HEALTH CARE EDUCATION/TRAINING PROGRAM

## 2020-10-15 RX ORDER — MAGNESIUM SULFATE IN WATER 40 MG/ML
2 INJECTION, SOLUTION INTRAVENOUS ONCE
Status: COMPLETED | OUTPATIENT
Start: 2020-10-15 | End: 2020-10-15

## 2020-10-15 RX ORDER — POTASSIUM CHLORIDE 20 MEQ/1
20 TABLET, EXTENDED RELEASE ORAL ONCE
Status: DISCONTINUED | OUTPATIENT
Start: 2020-10-15 | End: 2020-10-17 | Stop reason: HOSPADM

## 2020-10-15 RX ORDER — SENNA PLUS 8.6 MG/1
1 TABLET ORAL NIGHTLY
Status: DISCONTINUED | OUTPATIENT
Start: 2020-10-15 | End: 2020-10-17 | Stop reason: HOSPADM

## 2020-10-15 RX ORDER — CEFUROXIME AXETIL 500 MG/1
500 TABLET ORAL EVERY 12 HOURS SCHEDULED
Qty: 28 TABLET | Refills: 0 | Status: CANCELLED | OUTPATIENT
Start: 2020-10-15 | End: 2020-10-29

## 2020-10-15 RX ORDER — POTASSIUM CHLORIDE 20 MEQ/1
40 TABLET, EXTENDED RELEASE ORAL ONCE
Status: COMPLETED | OUTPATIENT
Start: 2020-10-15 | End: 2020-10-15

## 2020-10-15 RX ORDER — DOCUSATE SODIUM 100 MG/1
100 CAPSULE, LIQUID FILLED ORAL DAILY
Status: DISCONTINUED | OUTPATIENT
Start: 2020-10-15 | End: 2020-10-17 | Stop reason: HOSPADM

## 2020-10-15 RX ADMIN — ASPIRIN 81 MG: 81 TABLET, CHEWABLE ORAL at 09:55

## 2020-10-15 RX ADMIN — SODIUM CHLORIDE, PRESERVATIVE FREE 10 ML: 5 INJECTION INTRAVENOUS at 09:54

## 2020-10-15 RX ADMIN — PANTOPRAZOLE SODIUM 40 MG: 40 INJECTION, POWDER, FOR SOLUTION INTRAVENOUS at 09:53

## 2020-10-15 RX ADMIN — POTASSIUM BICARBONATE 40 MEQ: 782 TABLET, EFFERVESCENT ORAL at 17:00

## 2020-10-15 RX ADMIN — LISINOPRIL 10 MG: 10 TABLET ORAL at 09:55

## 2020-10-15 RX ADMIN — MAGNESIUM SULFATE HEPTAHYDRATE 2 G: 40 INJECTION, SOLUTION INTRAVENOUS at 10:05

## 2020-10-15 RX ADMIN — RIVAROXABAN 20 MG: 20 TABLET, FILM COATED ORAL at 17:01

## 2020-10-15 RX ADMIN — MICONAZOLE NITRATE: 20 POWDER TOPICAL at 22:22

## 2020-10-15 RX ADMIN — ROPINIROLE HYDROCHLORIDE 0.5 MG: 0.5 TABLET, FILM COATED ORAL at 09:54

## 2020-10-15 RX ADMIN — POLYETHYLENE GLYCOL 3350 17 G: 17 POWDER, FOR SOLUTION ORAL at 09:52

## 2020-10-15 RX ADMIN — METRONIDAZOLE 500 MG: 250 TABLET ORAL at 05:49

## 2020-10-15 RX ADMIN — POTASSIUM CHLORIDE 40 MEQ: 20 TABLET, EXTENDED RELEASE ORAL at 09:54

## 2020-10-15 RX ADMIN — MICONAZOLE NITRATE: 20 POWDER TOPICAL at 09:55

## 2020-10-15 NOTE — PLAN OF CARE
Problem: Falls - Risk of:  Goal: Will remain free from falls  Description: Will remain free from falls  Outcome: Ongoing   Pt remains free from falls during this shift. Bed in lowest position and wheels locked. Call light within reach. Bedside table within reach. Pt educated to call out for assistance. Pt has bed check and avasys in place. Will continue to monitor.

## 2020-10-15 NOTE — PROGRESS NOTES
Comprehensive Nutrition Assessment    Type and Reason for Visit:  Reassess    Nutrition Recommendations/Plan:   1. Continue current diet  2. Start Ensure Enlive TID  3. Encourage PO intakes as tolerated  4. Monitor nutrition adequacy, pertinent labs, bowel habits, wt changes, and clinical progress    Nutrition Assessment:  Follow up. Pt complains of poor appetite at time of visit. Noted pt did not eat much of her breakfast and only a few bites of her magic cup. Pt reports she enjoys the fortified pudding. Does not enjoy the magic cup but willing to try an Ensure. Denies any N/V or abdominal pain. Encouraged PO intakes as tolerated. Will monitor for further nutrition intervention. Malnutrition Assessment:  Malnutrition Status: At risk for malnutrition (Comment)      Estimated Daily Nutrient Needs:  Energy (kcal):  1047-8514; Weight Used for Energy Requirements:  Ideal(54.54 kg)     Protein (g):  54-65 g/day; Weight Used for Protein Requirements:  Ideal(1.0-1.2 g/kg)        Fluid (ml/day):  1 ml/kcal; Weight Used for Fluid Requirements:  [place]      Nutrition Related Findings:  poor appetite, active BS, no BM noted      Wounds:  None       Current Nutrition Therapies:    DIET DENTAL SOFT;  Dietary Nutrition Supplements: Standard High Calorie Oral Supplement    Anthropometric Measures:  · Height: 5' 4\" (162.6 cm)  · Current Body Weight: 171 lb 1.2 oz (77.6 kg)    · Ideal Body Weight: 120 lbs; % Ideal Body Weight 142.6 %   · BMI: 29.4  · Adjusted Body Weight:   No Adjustment   · BMI Categories: Overweight (BMI 25.0-29. 9)       Nutrition Diagnosis:   · Inadequate oral intake related to early satiety as evidenced by intake 0-25%      Nutrition Interventions:   Food and/or Nutrient Delivery:  Continue Current Diet, Modify Oral Nutrition Supplement  Nutrition Education/Counseling:  Education not indicated   Coordination of Nutrition Care:  Continued Inpatient Monitoring    Goals:  Consume >50% of meals and ONS Nutrition Monitoring and Evaluation:       Food/Nutrient Intake Outcomes:  Food and Nutrient Intake, Supplement Intake  Physical Signs/Symptoms Outcomes:  Biochemical Data, Meal Time Behavior     Discharge Planning:    Continue Oral Nutrition Supplement, Continue current diet     Electronically signed by Luis Mahajan, Dietetics student on 10/15/20 at 10:14 AM EDT    Contact: [phone]

## 2020-10-15 NOTE — PROGRESS NOTES
Inpatient Family Medicine Progress Note      PCP: Eugenie Jordan MD    Date of Admission: 10/7/2020    Chief Complaint: [address] Course:     66 y. o. female with a pmhx significant for breast cancer, Afib (with PM), HTN who was admitted to [facility] with a 2 week history of worsening AMS presumably 2/2 UTI. She is normally alert and oriented but had deteriorated to the point where she was unable to take care of herself. Nichol Orta was also c/o fatigue, abdominal and back pain, but denied fever/chills/chest pain/nausea/vomiting/diarrhea.      In the ER, patient was consistently uncomfortable on her bed.  Labs were significant for PATITO (Cr 3.8 and GFR 11), mildly elevated AST, bilirubin, lipase, and WBC 11.8. UA significant for likely UTI.  CT head showed no acute changes and CT abdomen showed gallstones but no other acute findings. Patient is intolerant to multiple antibiotics and pharmacy was consulted for antibiotic recommendation for patient's UTI. Patient was started on Aztreonam.     On the floor, patient was started on fluids. PT/OT was consulted. One blood culture came back positive for gram positive rods and was started on Vancomycin. ID was consulted. UCx was positive for E. Coli and patient's abx were changed from vanc/aztreonam to ceftriaxone and flagyl. Patient was checked for reversible causes of delirium including TSH, Iron studies, B1, B12, Folate, RPR.  Patient's mentation started to improve on hospital day 4 and continues to improve - she is now alert and oriented, answers questions appropriately.      Subjective:     Patient was examined sitting upright in bed this morning, with her breakfast tray in front of her. She picked a little at the eggs and hashbrowns but did not feel like eating that, and wanted pudding instead. She was still complaining of some abdominal pain.        Medications:  Reviewed    Infusion Medications     Scheduled Medications    magnesium sulfate  2 g Intravenous Once    rivaroxaban  20 mg Oral Daily    docusate sodium  100 mg Oral Daily    senna  1 tablet Oral Nightly    metroNIDAZOLE  500 mg Oral 3 times per day    cefUROXime  500 mg Oral 2 times per day    polyethylene glycol  17 g Oral Daily    pantoprazole  40 mg Intravenous Daily    miconazole   Topical BID    aspirin  81 mg Oral Daily    dilTIAZem  120 mg Oral Daily    fluticasone  1 spray Nasal Daily    rOPINIRole  0.5 mg Oral Daily    lisinopril  10 mg Oral Daily    sodium chloride flush  10 mL Intravenous 2 times per day     PRN Meds: melatonin, potassium chloride, perflutren lipid microspheres, acetaminophen, meclizine, sodium chloride flush, acetaminophen **OR** acetaminophen, polyethylene glycol, ondansetron, albuterol      Intake/Output Summary (Last 24 hours) at 10/15/2020 1133  Last data filed at 10/15/2020 0228  Gross per 24 hour   Intake 220 ml   Output --   Net 220 ml       Physical Exam Performed:    BP (!) 156/71   Pulse 74   Temp 97.7 °F (36.5 °C) (Oral)   Resp 18   Ht 5' 4\" (1.626 m)   Wt 171 lb 1.2 oz (77.6 kg)   SpO2 100%   BMI 29.37 kg/m²     General appearance:  NAD, appears stated age, and cooperative. HEENT:  Normal cephalic, atraumatic without obvious deformity. Conjunctivae clear. Neck: Supple. Trachea midline. Respiratory:  Normal respiratory effort. CTABL, without Rales/Wheezes/Rhonchi. Cardiovascular:  Regular rate and rhythm with normal S1/S2 without murmurs, rubs or gallops. Abdomen: +BS, non-distended, soft, mildly tender to palpation LUQ  Musculoskeletal:  No clubbing, cyanosis or edema bilaterally.    Skin: Warm and dry  Neurologic:  No focal deficits, no gross sensory,motor deficits  Psychiatric: Slow speech but is alert and oriented, thought content appropriate, normal insight  Capillary Refill: Brisk,< 3 seconds   Peripheral Pulses: +2 palpable, equal bilaterally       Labs:   Recent Labs     10/13/20  0639 10/14/20  0556 10/15/20  0544   WBC 7.1 6.3 6.4   HGB 11.8* 11.3* 11.1*   HCT 36.4 35.4* 34.0*    151 156     Recent Labs     10/13/20  0639 10/14/20  0556 10/15/20  0544    140 138   K 3.2* 3.6 3.4*    110 109   CO2 19* 20* 20*   BUN 5* 4* 3*   CREATININE 0.6 0.6 0.6   CALCIUM 8.1* 8.1* 8.4     No results for input(s): AST, ALT, BILIDIR, BILITOT, ALKPHOS in the last 72 hours. No results for input(s): INR in the last 72 hours. No results for input(s): Josue Clap in the last 72 hours. Urinalysis:      Lab Results   Component Value Date    NITRU Negative 10/07/2020    WBCUA >100 10/07/2020    BACTERIA 4+ 10/07/2020    RBCUA see below 10/07/2020    BLOODU SMALL 10/07/2020    SPECGRAV 1.025 10/07/2020    GLUCOSEU Negative 10/07/2020    GLUCOSEU NEGATIVE 04/24/2012       Radiology:  US GALLBLADDER RUQ   Final Result   Cholelithiasis. CT CHEST W CONTRAST   Final Result   1. No acute inflammatory changes surrounding the pacer pack in the anterior   chest wall on the left. No focal fluid collection or abnormal enhancement. 2. Small bilateral pleural effusions with dependent lower lobe atelectasis. 3. Moderate-sized hiatal hernia. CT HEAD WO CONTRAST   Final Result   No acute intracranial abnormality. Atrophy more pronounced in the posterior fossa, unchanged. CT ABDOMEN PELVIS WO CONTRAST Additional Contrast? None   Final Result   No acute findings. Gallstones. XR CHEST PORTABLE   Final Result   Negative portable chest x-ray.              Assessment/Plan:    Active Hospital Problems    Diagnosis Date Noted    Elevated troponin [R77.8]     PAF (paroxysmal atrial fibrillation) (HCC) [I48.0]     History of pacemaker [Z95.0]     Bacteremia due to Gram-positive bacteria [R78.81] 10/09/2020    Acute UTI [N39.0] 10/07/2020    Hypotension [I95.9] 10/07/2020    Altered mental status [R41.82] 10/07/2020    Abdominal pain [R10.9] 05/28/2016    Acute kidney injury ([facility] [address] [N17.9] 05/10/2016     1. AMS possibly 2/2 Acute Cystitis and sepsis  - appears to be improving  -TSH, RPR, b12, folate, and iron studies noncontributory.  B1 pending.  - Finished course of IV vanc and aztreonam. Switched to Metronidazole and Ceftriaxone for GI pathogens. On PO Ceftin and Flagyl starting 10/15 per ID in anticipation of SNF  - Urine culture - e coli  - Blood culture positive - clostridium perfringens  -Encouraged nutrition and working with PT/OT. Александр Nguyen refusing PT/OT, cont'd encouragement.    -Fortified Nutrition supplement     2. Sepsis with gram positive khurram bacteremia  - ID following.  Abx per ID.   - 1000 mL bolus NS given in ED.  - Hypotension improved, patient is now hypertensive. Will continue home meds. - patient with good mentation today, answers questions appropriately.     3. UTI:  -See AMS    4. Abdominal Pain  - mostly in LUQ, some epigastric tenderness as well. Possibly 2/2 poor PO intake over the last few days and should improve as patient continues to eat and drink  - patient reports not having had a bowel movement in a few days, could be 2/2 constipation as well. Will add on senna and colace to glycolax.      5. Atypical Chest Pain:  Worsened by palpation  -Repeat EKG 10/11 without acute ischemic changes, peaked T-waves in anterolateral leads have improved. -Troponin 0.04->0.03; likely 2/2 demand ischemia from sepsis. Cardiology consulted, recs resume PO meds when pt agreeable to take; stop IV fluids given signs of fluid overload, resume anticoagulation if no procedures planned.      6. Acute Kidney Injury - Resolved     7. Hypotension - Resolved    DVT Prophylaxis: Lovenox 1.5mg/kg/qd, SCDs  Diet: DIET DENTAL SOFT;  Dietary Nutrition Supplements: Standard High Calorie Oral Supplement  Code Status: Full Code  PT/OT Eval Status: Recs SNF    [address] pending precert and if tolerating PO, possibly 1-2 days    This patient was seen and evaluated with resident team and attending, Dr. Radha Arriaza.     Don Segal, DO  Family Medicine Resident PGY-2

## 2020-10-15 NOTE — CARE COORDINATION
Chart reviewed day 8. Spoke with Sheyla at [facility]. Cert remains pending, updated notes faxed today. If approved will need rapid covid. Is likely medically ready today. Following. Matilda Donald RN      Spoke with Jhonatan Ott at [facility]. Stated have received notification of intent to deny. If physician would like to complete peer to peer, will need completed by 2pm tomorrow. To arrange, [phone]. Spoke with Dr Dennis simms. State will not do peer to peer. Can pursue LTC option at [facility]. With therapy provided by part B portion of medicare. Call to son Rupesh Sanderson. Message left. Matilda Donald RN      Spoke with Rupesh Sanderson ok for LTC at [facility] with therapy. PASSR completed bora signed. Facility to initiate cert.  Matilda Donald RN

## 2020-10-15 NOTE — PROGRESS NOTES
Infectious Disease Follow up Notes    CC :  UTI, Clostridia bacteremia      Antibiotics:  ceftin 500 po BID  Flagyl 500 po TID    Admit Date:   10/7/2020  Hospital Day: 9    Subjective:   She remains afebrile on room air   \"I'm not good\"  Complains of diffuse abd pain. Objective:     Patient Vitals for the past 8 hrs:   BP Temp Temp src Pulse Resp SpO2 Height   10/15/20 1431 128/65 97.8 °F (36.6 °C) Oral 60 16 98 % --   10/15/20 1305 116/87 97.5 °F (36.4 °C) Oral 62 16 98 % --   10/15/20 1004 -- -- -- -- -- -- 5' 4\" (1.626 m)   10/15/20 0846 (!) 156/71 -- -- 74 -- -- --   10/15/20 0844 (!) 161/104 97.7 °F (36.5 °C) Oral 60 18 100 % --       EXAM:  General:  Alert, cooperative with exam, no apparent distress    HEENT:   NCAT, PERRL, sclera anicteric  MMM, no thrush  NECK:  supple      LUNGS:  Non-labored breathing.   Upper lobes clear    CV:   RRR  ABD: Soft, flat, NT    EXT:  No focal rash or LE edema         LINE: PIV site ok         Scheduled Meds:   rivaroxaban  20 mg Oral Daily    docusate sodium  100 mg Oral Daily    senna  1 tablet Oral Nightly    potassium chloride  20 mEq Oral Once    potassium bicarb-citric acid  40 mEq Oral Daily    metroNIDAZOLE  500 mg Oral 3 times per day    cefUROXime  500 mg Oral 2 times per day    polyethylene glycol  17 g Oral Daily    pantoprazole  40 mg Intravenous Daily    miconazole   Topical BID    aspirin  81 mg Oral Daily    dilTIAZem  120 mg Oral Daily    fluticasone  1 spray Nasal Daily    rOPINIRole  0.5 mg Oral Daily    lisinopril  10 mg Oral Daily    sodium chloride flush  10 mL Intravenous 2 times per day       Data Review:    Lab Results   Component Value Date    WBC 6.4 10/15/2020    HGB 11.1 (L) 10/15/2020    HCT 34.0 (L) 10/15/2020    MCV 77.5 (L) 10/15/2020     10/15/2020     Lab Results   Component Value Date    CREATININE 0.6 10/15/2020    BUN 3 (L) appeared normal on CT scan.    Abx day #7, currently on po flagyl  -complete total 10d course, flagyl through 10/18/20  -monitor expectantly thereafter      PATITO - resolved      Listed allergy Augmentin, cipro, sulfa, cephalexin (rash)  She has received several course oral and parenteral cephalosporins in the past        Little more to add, please call with further questions         Riya Huff MD  Phone: [phone]   Fax : [phone]

## 2020-10-15 NOTE — PLAN OF CARE
Nutrition Problem #1: Inadequate oral intake  Intervention: Food and/or Nutrient Delivery: Continue Current Diet, Modify Oral Nutrition Supplement  Nutritional Goals: Consume >50% of meals and ONS

## 2020-10-15 NOTE — PROGRESS NOTES
[facility]   Daily Progress Note    Admit Date:  10/7/2020  HPI:    Chief Complaint   Patient presents with    Altered Mental Status     hasn't been eating/drinking x 2-3 days per family; hasn't been urinating per family/EMS. Spoke to patient's son, Jona Sam. Patient has had a steady decline in mental status for 2 weeks, dizziness, decreased PO intake, weight loss. No hx of dementia, patient normally can perform ADLs independently and has not been able to do so.  Abnormal Lab     mild renal failure on bloodwork done Tuesday at PCP, appt with nephrologist on 10/19. Presented with chest pain but difficult historian, altered mental status, poor po intake. Found to have PATITO, treated with IV fluids and UTI. Cardiology consulted for chest pain, elevated troponins. Hx of dCHF, PAF s/p RFCA on Xarelto, CHB s/p dual PPM, HTN. Subjective:  Ms. Genao Halo awake, alert, answering questions appropriately, complains of cold, shortness of breath and abdominal pain. Appetite remains poor. Objective:   BP (!) 156/71   Pulse 74   Temp 97.7 °F (36.5 °C) (Oral)   Resp 18   Ht 5' 4\" (1.626 m)   Wt 171 lb 1.2 oz (77.6 kg)   SpO2 100%   BMI 29.37 kg/m²       Intake/Output Summary (Last 24 hours) at 10/15/2020 1229  Last data filed at 10/15/2020 0228  Gross per 24 hour   Intake 220 ml   Output --   Net 220 ml     Wt Readings from Last 3 Encounters:   10/15/20 171 lb 1.2 oz (77.6 kg)   07/31/19 172 lb (78 kg)   07/10/19 172 lb (78 kg)           ASSESSMENT:   1. Chest pain - tender to palpation, atypical of angina, less tender today  2. Elevated troponin - no rise or fall, not consistent with ACS  3. CHF, chronic systolic - weight down > 20 lbs since last OV, echo with increased filling pressures, device check with decreased thoracic impedence consistent with increased fluid index  4. HTN - improved but remains sub-optimal, now taking po meds  5.  PAF- current underlying atrial flutter with ventricular pacing, anticoagulation has been resumed  6. CHB - s/p dual PPM  7. PATITO - resolved      PLAN:  1. Resuming po meds as patient will agree to take  2. Would resume lower dose diuretic when felt to be safe from renal perspective (consider spironolactone vs loop diuretic)  3. Nothing further to contribute from cardiac perspective. Will sign off. Will arrange a follow up in office 1 month    MARIOLA Apodaca - CNP, 10/15/2020, 12:29 PM  [address]   [phone]       Telemetry: A flutter, V paced 60's  NYHA: III    Physical Exam:  General:  Awake, alert, NAD  Skin:  Warm and dry  Neck:  JVP 8-9 cm  Chest:  Clear to auscultation   Cardiovascular:  RRR, normal H6W5, + systolic murmur, no g/r  Abdomen:  Soft, nontender, +bowel sounds  Extremities:  No BLE edema        Medications:    magnesium sulfate  2 g Intravenous Once    rivaroxaban  20 mg Oral Daily    docusate sodium  100 mg Oral Daily    senna  1 tablet Oral Nightly    metroNIDAZOLE  500 mg Oral 3 times per day    cefUROXime  500 mg Oral 2 times per day    polyethylene glycol  17 g Oral Daily    pantoprazole  40 mg Intravenous Daily    miconazole   Topical BID    aspirin  81 mg Oral Daily    dilTIAZem  120 mg Oral Daily    fluticasone  1 spray Nasal Daily    rOPINIRole  0.5 mg Oral Daily    lisinopril  10 mg Oral Daily    sodium chloride flush  10 mL Intravenous 2 times per day         Lab Data: Lab results independently reviewed by myself 10/14/20   CBC:   Recent Labs     10/13/20  0639 10/14/20  0556 10/15/20  0544   WBC 7.1 6.3 6.4   HGB 11.8* 11.3* 11.1*    151 156     BMP:    Recent Labs     10/13/20  0639 10/14/20  0556 10/15/20  0544    140 138   K 3.2* 3.6 3.4*   CO2 19* 20* 20*   BUN 5* 4* 3*   CREATININE 0.6 0.6 0.6     INR:  No results for input(s): INR in the last 72 hours. BNP:  No results for input(s): PROBNP in the last 72 hours.   Cardiac Enzymes: No results for input(s): TROPONINI in the last 72 hours. Lipids:   Lab Results   Component Value Date    TRIG 63 03/15/2018    TRIG 73 02/13/2017    HDL 72 03/15/2018    HDL 62 02/13/2017    LDLCALC 111 03/15/2018    LDLCALC 82 02/13/2017       Cardiac Imaging:   ECHO 10/14/20:   Summary   Limited only f/u for LVEF and pacer wire infection. Normal left ventricular systolic function with ejection fraction of 55-60%. No regional wall motion abnormalites are seen. Compared to previous study from 5- no changes noted in left ventricular function. Moderate tricuspid regurgitation. Systolic pulmonic artery pressure (SPAP) is estimated at 53 mmHg consistent with mild pulmonary hypertension (Right atrial pressure of mmHg). Pacemaker / ICD lead is visualized in the right atrium. No mass or thrombus noted on leads, consider ARTHUR if further assessment is needed. Echo: 5/2019   Normal left ventricular systolic function with a visually estimated ejection   fraction of 55%. No regional wall motion abnormalities are seen. Normal left ventricular diastolic filling pressure. Bi-atrial enlargement. Mild mitral and tricuspid regurgitation. Systolic pulmonary artery pressure (SPAP) is normal and estimated at 28 mmHg   (right atrial pressure 8 mmHg).

## 2020-10-15 NOTE — PROGRESS NOTES
Pt assessment completed and charted. VSS. Pt a/o to person and place. Pt disoriented to time and situation. Pt Q2/hr turn. Pillow support in place. Pt was willing to take melatonin for a sleep aid, pt has been more cooperate when encouraged. Pt has been resting comfortably, much more than last evening. Pt denies any other needs at this time. Pt calls out appropriately. Will continue to monitor.        Pt is a fall risk;  -Bed in lowest position and wheels locked. -Call light within reach.   -Bedside table within reach.   -Non-skid footwear in place.  -bed check and avasys in place.

## 2020-10-15 NOTE — PROGRESS NOTES
Inpatient Family Medicine   Update Note    Patient was examined sitting up in bed this afternoon. RN was in the room and reported that while patient did take some of her meds PO, she was unable to take the rest and was putting it too far down the back of her throat that she ended up spitting it back up. Will order potassium liquid instead to see if patient would tolerate this. Discussed with case management about discharge planning for patient - patient may not be a good fit for the SNF as she refuses to do PT/OT and also needs work with reinforcement and cognition. Will opt for LTAC option instead. /65   Pulse 60   Temp 97.8 °F (36.6 °C) (Oral)   Resp 16   Ht 5' 4\" (1.626 m)   Wt 171 lb 1.2 oz (77.6 kg)   SpO2 98%   BMI 29.37 kg/m²     General appearance:  NAD, appears stated age, and cooperative. HEENT:  Normal cephalic, atraumatic without obvious deformity. Conjunctivae clear. Respiratory:  Normal respiratory effort. CTABL, without Rales/Wheezes/Rhonchi. Cardiovascular:  Regular rate and rhythm with normal S1/S2 without murmurs, rubs or gallops.   Abdomen: +BS, non-distended, soft, mildly tender to palpation diffusely   Skin: Warm and dry    - Effervescent potassium ordered  - Repeat Mg and K labs  - Will replete electrolytes as needed  - Dispo pending insurance and patient's ability to tolerate PO intake    This patient was seen and evaluated with resident team and attending, Dr. Goldie Howell DO    Family Medicine Resident PGY2

## 2020-10-16 PROCEDURE — 6360000002 HC RX W HCPCS: Performed by: STUDENT IN AN ORGANIZED HEALTH CARE EDUCATION/TRAINING PROGRAM

## 2020-10-16 PROCEDURE — 6370000000 HC RX 637 (ALT 250 FOR IP): Performed by: STUDENT IN AN ORGANIZED HEALTH CARE EDUCATION/TRAINING PROGRAM

## 2020-10-16 PROCEDURE — 6370000000 HC RX 637 (ALT 250 FOR IP)

## 2020-10-16 PROCEDURE — 2580000003 HC RX 258: Performed by: STUDENT IN AN ORGANIZED HEALTH CARE EDUCATION/TRAINING PROGRAM

## 2020-10-16 PROCEDURE — 6360000002 HC RX W HCPCS

## 2020-10-16 PROCEDURE — 1200000000 HC SEMI PRIVATE

## 2020-10-16 PROCEDURE — C9113 INJ PANTOPRAZOLE SODIUM, VIA: HCPCS | Performed by: STUDENT IN AN ORGANIZED HEALTH CARE EDUCATION/TRAINING PROGRAM

## 2020-10-16 RX ORDER — POTASSIUM CHLORIDE 7.45 MG/ML
20 INJECTION INTRAVENOUS ONCE
Status: DISCONTINUED | OUTPATIENT
Start: 2020-10-16 | End: 2020-10-16 | Stop reason: SDUPTHER

## 2020-10-16 RX ORDER — POTASSIUM CHLORIDE 7.45 MG/ML
10 INJECTION INTRAVENOUS
Status: COMPLETED | OUTPATIENT
Start: 2020-10-16 | End: 2020-10-16

## 2020-10-16 RX ADMIN — CEFUROXIME AXETIL 500 MG: 250 TABLET ORAL at 14:17

## 2020-10-16 RX ADMIN — POTASSIUM CHLORIDE 10 MEQ: 10 INJECTION, SOLUTION INTRAVENOUS at 11:51

## 2020-10-16 RX ADMIN — SODIUM CHLORIDE, PRESERVATIVE FREE 10 ML: 5 INJECTION INTRAVENOUS at 09:00

## 2020-10-16 RX ADMIN — MICONAZOLE NITRATE: 20 POWDER TOPICAL at 09:15

## 2020-10-16 RX ADMIN — POTASSIUM CHLORIDE 10 MEQ: 7.46 INJECTION, SOLUTION INTRAVENOUS at 14:00

## 2020-10-16 RX ADMIN — PANTOPRAZOLE SODIUM 40 MG: 40 INJECTION, POWDER, FOR SOLUTION INTRAVENOUS at 09:00

## 2020-10-16 RX ADMIN — RIVAROXABAN 20 MG: 20 TABLET, FILM COATED ORAL at 17:30

## 2020-10-16 RX ADMIN — POTASSIUM CHLORIDE 10 MEQ: 7.46 INJECTION, SOLUTION INTRAVENOUS at 12:00

## 2020-10-16 RX ADMIN — MICONAZOLE NITRATE: 20 POWDER TOPICAL at 23:27

## 2020-10-16 RX ADMIN — ROPINIROLE HYDROCHLORIDE 0.5 MG: 0.5 TABLET, FILM COATED ORAL at 15:12

## 2020-10-16 NOTE — PLAN OF CARE
Problem: Skin Integrity:  Goal: Absence of new skin breakdown  Description: Absence of new skin breakdown  Outcome: Ongoing  Note: Will continue to turn and change Pt on a Q2 turning schedule.

## 2020-10-16 NOTE — PROGRESS NOTES
Occupational Therapy  Attempted OT tx. Pt declined, stating, \"Not right now. \" Cont OT.   Darryle Luria OT

## 2020-10-16 NOTE — PROGRESS NOTES
Pt A&O to person and place only. VSS. Denies dyspnea or N/V at this time. Bowel sounds active in all quadrants. Pt refused dinner and night time medications. Assessment is as charted. CALL LIGHT & BEDSIDE TABLE WITHIN REACH, WHEELS LOCKED, BED IN LOWEST POSITION, BED ALARM ON, PT INSTRUCTED TO CALL OUT FOR ASSISTANCE & EXPRESSED UNDERSTANDING, CALLS OUT APPROPRIATELY.     Electronically signed by Angelica Stephenson RN on 10/16/2020 at 1:33 AM

## 2020-10-16 NOTE — PLAN OF CARE
Awake and talkative today but confused to situation and time and place. Nataliia Cruz. Son visited today. Refused lab draws and most of medications. Above notified to Dr. Aaliyah Hernandez. Skin care given. Assisted with food but refused most except pudding and Ensure. Bed alarm on for safety and Avasys for safety. Call light in reach.

## 2020-10-16 NOTE — PROGRESS NOTES
Inpatient Family Medicine Progress Note      PCP: Oliver Uribe MD    Date of Admission: 10/7/2020    Chief Complaint: [address] Course:    66 y. o. female with a pmhx significant for breast cancer, Afib (with PM), HTN who was admitted to [facility] with a 2 week history of worsening AMS presumably 2/2 UTI. She is normally alert and oriented but had deteriorated to the point where she was unable to take care of herself. Nannette Godoy was also c/o fatigue, abdominal and back pain, but denied fever/chills/chest pain/nausea/vomiting/diarrhea.      In the ER, patient was consistently uncomfortable on her bed.  Labs were significant for PATITO (Cr 3.8 and GFR 11), mildly elevated AST, bilirubin, lipase, and WBC 11.8. UA significant for likely UTI.  CT head showed no acute changes and CT abdomen showed gallstones but no other acute findings. Patient is intolerant to multiple antibiotics and pharmacy was consulted for antibiotic recommendation for patient's UTI. Patient was started on Aztreonam.     On the floor, patient was started on fluids. PT/OT was consulted. One blood culture came back positive for gram positive rods and was started on Vancomycin. ID was consulted. UCx was positive for E. Coli and patient's abx were changed from vanc/aztreonam to ceftriaxone and flagyl. Patient was checked for reversible causes of delirium including TSH, Iron studies, B1, B12, Folate, RPR.  Patient's mentation started to improve on hospital day 4 and continues to improve - she is now alert, has slow mentation, but answers questions appropriately.      Subjective:     Patient was examined this morning sitting up in bed with her breakfast tray in front of her. She was agitated and irritable this morning, and frustrated that no one heated her food up for her. She was also refusing to get labs drawn this morning. Was still complaining of some abdominal pain.  Of note, patient refused almost all of her PO medications yesterday including her antibiotics. Medications:  Reviewed    Infusion Medications   Scheduled Medications    rivaroxaban  20 mg Oral Daily    docusate sodium  100 mg Oral Daily    senna  1 tablet Oral Nightly    potassium chloride  20 mEq Oral Once    potassium bicarb-citric acid  40 mEq Oral Daily    metroNIDAZOLE  500 mg Oral 3 times per day    cefUROXime  500 mg Oral 2 times per day    polyethylene glycol  17 g Oral Daily    pantoprazole  40 mg Intravenous Daily    miconazole   Topical BID    aspirin  81 mg Oral Daily    dilTIAZem  120 mg Oral Daily    fluticasone  1 spray Nasal Daily    rOPINIRole  0.5 mg Oral Daily    lisinopril  10 mg Oral Daily    sodium chloride flush  10 mL Intravenous 2 times per day     PRN Meds: melatonin, potassium chloride, perflutren lipid microspheres, acetaminophen, meclizine, sodium chloride flush, acetaminophen **OR** acetaminophen, polyethylene glycol, ondansetron, albuterol      Intake/Output Summary (Last 24 hours) at 10/16/2020 0809  Last data filed at 10/16/2020 0300  Gross per 24 hour   Intake 434 ml   Output 200 ml   Net 234 ml       Physical Exam Performed:    BP (!) 151/82   Pulse 61   Temp 97.5 °F (36.4 °C) (Oral)   Resp 18   Ht 5' 4\" (1.626 m)   Wt 172 lb 2.9 oz (78.1 kg)   SpO2 96%   BMI 29.55 kg/m²     General appearance:  Agitated, irritable, appears stated age. Uncooperative. HEENT:  Normal cephalic, atraumatic without obvious deformity. Conjunctivae clear. Neck: Supple. Trachea midline. Respiratory:  Normal respiratory effort. CTABL, without Rales/Wheezes/Rhonchi. Cardiovascular:  Regular rate and rhythm with normal S1/S2  Abdomen: +BS, soft, non-distended, mildly diffusely tender to palpation without guarding. Musculoskeletal:  No clubbing, cyanosis or edema bilaterally. Skin: Warm and dry  Neurologic:  No focal deficits, no gross sensory,motor deficits  Psychiatric:  Alert.       Labs:   Recent Labs     10/14/20  0556 10/15/20  0544   WBC 6.3 6.4 [N17.9] 05/10/2016       1. AMS possibly 2/2 Acute Cystitis and sepsis  - appears to be improving  -TSH, RPR, b12, folate, and iron studies noncontributory.  B1 <0.2.  - Finished course of IV vanc and aztreonam. Switched to Metronidazole and Ceftriaxone for GI pathogens.  On PO Ceftin and Flagyl starting 10/15 per ID in anticipation of SNF but patient has been refusing PO medications. - Urine culture - e coli  - Blood culture positive - clostridium perfringens  -Encouraged nutrition and working with PT/OT.  Patient refusing PT/OT, cont'd encouragement.    -Fortified Nutrition supplement     2. Sepsis with gram positive khurram bacteremia  - ID following. Recs d/c Ceftin and continue Flagyl TID until 10/18. However patient is refusing PO medications.    - 1000 mL bolus NS given in ED.  - Hypotension improved, patient is now hypertensive. Will continue home meds.      3. UTI:  -See AMS     4. Abdominal Pain  - mildly diffuse, likely 2/2 poor PO intake over the last few days, patient still continues to have poor PO intake  - patient reports not having had a bowel movement in a few days, could be 2/2 constipation as well. Will add on senna and colace to glycolax.      5. Atypical Chest Pain:  Worsened by palpation  -Repeat EKG 10/11 without acute ischemic changes, peaked T-waves in anterolateral leads have improved. -Troponin 0.04->0.03; likely 2/2 demand ischemia from sepsis. Cardiology consulted, recs resume PO meds when pt agreeable to take; stop IV fluids given signs of fluid overload, resume anticoagulation if no procedures planned.      6. Acute Kidney Injury - Resolved     7. Hypotension - Resolved    DVT Prophylaxis: Lovenox 1.5mg/kg/qd, SCDs  Diet: DIET DENTAL SOFT;  Dietary Nutrition Supplements: Standard High Calorie Oral Supplement  Code Status: Full Code  PT/OT Eval Status: Consulted, is following    Jeanne Vega pending precert and if tolerating PO, possibly 1-2 days.      This patient was seen and evaluated with resident team and attending, Dr. Heather Saleh.     Daphne Norton DO  Family Medicine Resident PGY-2

## 2020-10-16 NOTE — PROGRESS NOTES
Encouraging pt to take PO meds. Pt took xarelto, some drinks of Effer-K. Continue to encourage her. Refuses mixed in pudding/applesauce. Pt places pill back of tongue and gags herself, inducing vomiting. Will continue to monitor.

## 2020-10-16 NOTE — CARE COORDINATION
Chart reviewed day 9. Spoke with Dr Riley Hopper, patient medically ready for d/c. Message left for Larissa Jeter to check status of LTC approval. Following. Corazon Taylor RN      Per Larissa Jeter can accept patient in am will need updated bora and med rec. Updated Dr Riley Hopper team. Corazon Taylor RN    Rapid covid ordered. King Segal RN updated.  Corazon Taylor RN

## 2020-10-16 NOTE — PROGRESS NOTES
Pt assessment completed and charted. VSS. Pt a/o to self/situation. Denies pain. Pt increasing po intake and tolerating well. Pt still unable to swallow most pills. Placing pill back of tongue and gaging herself, inducing vomiting. Has taken a few meds but refused the rest. Pt complete Q2T. Incontinent. Bed alarm in place. Avasys in place. Bed in lowest position and wheels locked. Call light within reach. Bedside table within reach. Non-skid footwear in place. Pt denies any other needs at this time. Pt calls out appropriately. Will continue to monitor.

## 2020-10-17 VITALS
BODY MASS INDEX: 28.49 KG/M2 | SYSTOLIC BLOOD PRESSURE: 120 MMHG | WEIGHT: 166.89 LBS | DIASTOLIC BLOOD PRESSURE: 63 MMHG | TEMPERATURE: 98.1 F | RESPIRATION RATE: 17 BRPM | HEART RATE: 61 BPM | HEIGHT: 64 IN | OXYGEN SATURATION: 96 %

## 2020-10-17 LAB
ANION GAP SERPL CALCULATED.3IONS-SCNC: 6 MMOL/L (ref 3–16)
BASOPHILS ABSOLUTE: 0.1 K/UL (ref 0–0.2)
BASOPHILS RELATIVE PERCENT: 1 %
BUN BLDV-MCNC: 5 MG/DL (ref 7–20)
CALCIUM SERPL-MCNC: 8.2 MG/DL (ref 8.3–10.6)
CHLORIDE BLD-SCNC: 110 MMOL/L (ref 99–110)
CO2: 24 MMOL/L (ref 21–32)
CREAT SERPL-MCNC: 0.6 MG/DL (ref 0.6–1.2)
EOSINOPHILS ABSOLUTE: 0.2 K/UL (ref 0–0.6)
EOSINOPHILS RELATIVE PERCENT: 3.9 %
GFR AFRICAN AMERICAN: >60
GFR NON-AFRICAN AMERICAN: >60
GLUCOSE BLD-MCNC: 98 MG/DL (ref 70–99)
HCT VFR BLD CALC: 33.7 % (ref 36–48)
HEMOGLOBIN: 10.9 G/DL (ref 12–16)
LYMPHOCYTES ABSOLUTE: 1.3 K/UL (ref 1–5.1)
LYMPHOCYTES RELATIVE PERCENT: 22.8 %
MAGNESIUM: 1.8 MG/DL (ref 1.8–2.4)
MCH RBC QN AUTO: 25.3 PG (ref 26–34)
MCHC RBC AUTO-ENTMCNC: 32.5 G/DL (ref 31–36)
MCV RBC AUTO: 77.8 FL (ref 80–100)
MONOCYTES ABSOLUTE: 0.6 K/UL (ref 0–1.3)
MONOCYTES RELATIVE PERCENT: 9.4 %
NEUTROPHILS ABSOLUTE: 3.7 K/UL (ref 1.7–7.7)
NEUTROPHILS RELATIVE PERCENT: 62.9 %
PDW BLD-RTO: 23.4 % (ref 12.4–15.4)
PLATELET # BLD: 163 K/UL (ref 135–450)
PMV BLD AUTO: 8.5 FL (ref 5–10.5)
POTASSIUM REFLEX MAGNESIUM: 3.5 MMOL/L (ref 3.5–5.1)
RBC # BLD: 4.33 M/UL (ref 4–5.2)
SARS-COV-2, NAAT: NOT DETECTED
SODIUM BLD-SCNC: 140 MMOL/L (ref 136–145)
WBC # BLD: 5.9 K/UL (ref 4–11)

## 2020-10-17 PROCEDURE — 2580000003 HC RX 258: Performed by: STUDENT IN AN ORGANIZED HEALTH CARE EDUCATION/TRAINING PROGRAM

## 2020-10-17 PROCEDURE — C9113 INJ PANTOPRAZOLE SODIUM, VIA: HCPCS | Performed by: STUDENT IN AN ORGANIZED HEALTH CARE EDUCATION/TRAINING PROGRAM

## 2020-10-17 PROCEDURE — 85025 COMPLETE CBC W/AUTO DIFF WBC: CPT

## 2020-10-17 PROCEDURE — 6360000002 HC RX W HCPCS: Performed by: STUDENT IN AN ORGANIZED HEALTH CARE EDUCATION/TRAINING PROGRAM

## 2020-10-17 PROCEDURE — 6370000000 HC RX 637 (ALT 250 FOR IP): Performed by: STUDENT IN AN ORGANIZED HEALTH CARE EDUCATION/TRAINING PROGRAM

## 2020-10-17 PROCEDURE — U0002 COVID-19 LAB TEST NON-CDC: HCPCS

## 2020-10-17 PROCEDURE — 6370000000 HC RX 637 (ALT 250 FOR IP): Performed by: FAMILY MEDICINE

## 2020-10-17 PROCEDURE — 83735 ASSAY OF MAGNESIUM: CPT

## 2020-10-17 PROCEDURE — 36415 COLL VENOUS BLD VENIPUNCTURE: CPT

## 2020-10-17 PROCEDURE — 80048 BASIC METABOLIC PNL TOTAL CA: CPT

## 2020-10-17 PROCEDURE — 6370000000 HC RX 637 (ALT 250 FOR IP)

## 2020-10-17 RX ORDER — SPIRONOLACTONE 25 MG/1
25 TABLET ORAL DAILY
Qty: 30 TABLET | Refills: 0 | Status: SHIPPED | OUTPATIENT
Start: 2020-10-17

## 2020-10-17 RX ORDER — LIDOCAINE 4 G/G
1 PATCH TOPICAL DAILY
Status: DISCONTINUED | OUTPATIENT
Start: 2020-10-17 | End: 2020-10-17 | Stop reason: HOSPADM

## 2020-10-17 RX ORDER — METRONIDAZOLE 500 MG/1
500 TABLET ORAL EVERY 8 HOURS SCHEDULED
Qty: 6 TABLET | Refills: 0 | Status: SHIPPED | OUTPATIENT
Start: 2020-10-17 | End: 2020-10-19

## 2020-10-17 RX ORDER — VANCOMYCIN HYDROCHLORIDE 125 MG/1
125 CAPSULE ORAL 4 TIMES DAILY
Qty: 40 CAPSULE | Refills: 0 | Status: SHIPPED | OUTPATIENT
Start: 2020-10-17 | End: 2020-10-27

## 2020-10-17 RX ORDER — ONDANSETRON 2 MG/ML
4 INJECTION INTRAMUSCULAR; INTRAVENOUS ONCE
Status: COMPLETED | OUTPATIENT
Start: 2020-10-17 | End: 2020-10-17

## 2020-10-17 RX ADMIN — ONDANSETRON 4 MG: 2 INJECTION INTRAMUSCULAR; INTRAVENOUS at 12:17

## 2020-10-17 RX ADMIN — ROPINIROLE HYDROCHLORIDE 0.5 MG: 0.5 TABLET, FILM COATED ORAL at 10:05

## 2020-10-17 RX ADMIN — ASPIRIN 81 MG: 81 TABLET, CHEWABLE ORAL at 10:04

## 2020-10-17 RX ADMIN — PANTOPRAZOLE SODIUM 40 MG: 40 INJECTION, POWDER, FOR SOLUTION INTRAVENOUS at 10:05

## 2020-10-17 RX ADMIN — METRONIDAZOLE 250 MG: 250 TABLET ORAL at 14:13

## 2020-10-17 RX ADMIN — ONDANSETRON 4 MG: 4 TABLET, ORALLY DISINTEGRATING ORAL at 10:55

## 2020-10-17 RX ADMIN — LISINOPRIL 10 MG: 10 TABLET ORAL at 10:05

## 2020-10-17 RX ADMIN — DILTIAZEM HYDROCHLORIDE 120 MG: 120 CAPSULE, COATED, EXTENDED RELEASE ORAL at 10:05

## 2020-10-17 RX ADMIN — DOCUSATE SODIUM 100 MG: 100 CAPSULE ORAL at 10:05

## 2020-10-17 RX ADMIN — POTASSIUM BICARBONATE 40 MEQ: 782 TABLET, EFFERVESCENT ORAL at 10:04

## 2020-10-17 RX ADMIN — SODIUM CHLORIDE, PRESERVATIVE FREE 10 ML: 5 INJECTION INTRAVENOUS at 10:05

## 2020-10-17 NOTE — PROGRESS NOTES
At 1843, still unable to reach nurse at [facility] MYRANDA ELIZONDO to give report on pt that discharged at 1616. Left another message for Corry Schaefer (nurse) with call back number to give report. Our charge nurse aware report has not been given.  Electronically signed by Thang Feldman RN on 10/17/2020 at 6:47 PM

## 2020-10-17 NOTE — PROGRESS NOTES
Inpatient Family Medicine Progress Note      PCP: Stoney Polanco MD    Date of Admission: 10/7/2020    Chief Complaint: [address] Course:     66 y. o. female with a pmhx significant for breast cancer, Afib (with PM), HTN who was admitted to [facility] with a 2 week history of worsening AMS presumably 2/2 UTI. She is normally alert and oriented but had deteriorated to the point where she was unable to take care of herself. [facility] was also c/o fatigue, abdominal and back pain.      In the ER, labs were significant for PATITO (Cr 3.8 and GFR 11), mildly elevated AST, bilirubin, lipase, and WBC 11.8, UA suspicious for UTI.  CT head showed no acute changes and CT abdomen showed gallstones but no other acute findings. Patient is intolerant to multiple antibiotics and pharmacy was consulted for antibiotic recommendation for patient's UTI. Patient was started on Aztreonam.     On the floor, patient was started on fluids. PT/OT was consulted. One blood culture came back positive for gram positive rods and was started on Vancomycin. ID was consulted. UCx was positive for E. Coli and patient's abx were changed from vanc/aztreonam to ceftriaxone/flagyl. Patient was checked for reversible causes of delirium including TSH, Iron studies, B1, B12, Folate, RPR all which were noncontributory.  Patient's mentation started to improve on hospital day 4 and continues to improve - she is now alert, has slow mentation, but answers questions appropriately.       Subjective:     Patient was examined sitting up in bed with breakfast in front of her. She is doing okay and was not complaining of any chest pain or difficulty breathing. She tried taking a few bites of her [place] toast but did not like it. RN reported that she did have one bowel movement earlier in the day that was suspicious for cdiff.      Medications:  Reviewed    Infusion Medications   Scheduled Medications    rivaroxaban  20 mg Oral Daily    docusate sodium  100 mg Oral Daily    senna  1 tablet Oral Nightly    potassium chloride  20 mEq Oral Once    potassium bicarb-citric acid  40 mEq Oral Daily    metroNIDAZOLE  500 mg Oral 3 times per day    cefUROXime  500 mg Oral 2 times per day    polyethylene glycol  17 g Oral Daily    pantoprazole  40 mg Intravenous Daily    miconazole   Topical BID    aspirin  81 mg Oral Daily    dilTIAZem  120 mg Oral Daily    fluticasone  1 spray Nasal Daily    rOPINIRole  0.5 mg Oral Daily    lisinopril  10 mg Oral Daily    sodium chloride flush  10 mL Intravenous 2 times per day     PRN Meds: melatonin, potassium chloride, perflutren lipid microspheres, acetaminophen, meclizine, sodium chloride flush, acetaminophen **OR** acetaminophen, polyethylene glycol, ondansetron, albuterol      Intake/Output Summary (Last 24 hours) at 10/17/2020 0804  Last data filed at 10/17/2020 7318  Gross per 24 hour   Intake 794 ml   Output 0 ml   Net 794 ml       Physical Exam Performed:     /78   Pulse 63   Temp 98.1 °F (36.7 °C) (Axillary)   Resp 16   Ht 5' 4\" (1.626 m)   Wt 166 lb 14.2 oz (75.7 kg)   SpO2 97%   BMI 28.65 kg/m²     General appearance:  NAD, appears stated age, and cooperative. HEENT:  Normal cephalic, atraumatic without obvious deformity. Conjunctivae clear. Neck: Supple. Trachea midline. Respiratory:  Normal respiratory effort. CTABL, without Rales/Wheezes/Rhonchi. Cardiovascular:  Regular rate and rhythm with normal S1/S2 without murmurs, rubs or gallops. Abdomen: +BS, non-distended, soft, mildly tender to palpation in the epigastric region  Musculoskeletal:  No clubbing, cyanosis or edema bilaterally.    Skin: Warm and dry  Neurologic:  No gross sensory,motor deficits  Psychiatric:  Slow mentation, alert and oriented, thought content appropriate    Labs:   Recent Labs     10/15/20  0544 10/17/20  0615   WBC 6.4 5.9   HGB 11.1* 10.9*   HCT 34.0* 33.7*    163     Recent Labs     10/15/20  0544 10/15/20  1401 10/17/20  0615   --  140   K 3.4* 3.4* 3.5     --  110   CO2 20*  --  24   BUN 3*  --  5*   CREATININE 0.6  --  0.6   CALCIUM 8.4  --  8.2*     No results for input(s): AST, ALT, BILIDIR, BILITOT, ALKPHOS in the last 72 hours. No results for input(s): INR in the last 72 hours. No results for input(s): Amanda Bolanos in the last 72 hours. Urinalysis:      Lab Results   Component Value Date    NITRU Negative 10/07/2020    WBCUA >100 10/07/2020    BACTERIA 4+ 10/07/2020    RBCUA see below 10/07/2020    BLOODU SMALL 10/07/2020    SPECGRAV 1.025 10/07/2020    GLUCOSEU Negative 10/07/2020    GLUCOSEU NEGATIVE 04/24/2012       Radiology:  US GALLBLADDER RUQ   Final Result   Cholelithiasis. CT CHEST W CONTRAST   Final Result   1. No acute inflammatory changes surrounding the pacer pack in the anterior   chest wall on the left. No focal fluid collection or abnormal enhancement. 2. Small bilateral pleural effusions with dependent lower lobe atelectasis. 3. Moderate-sized hiatal hernia. CT HEAD WO CONTRAST   Final Result   No acute intracranial abnormality. Atrophy more pronounced in the posterior fossa, unchanged. CT ABDOMEN PELVIS WO CONTRAST Additional Contrast? None   Final Result   No acute findings. Gallstones. XR CHEST PORTABLE   Final Result   Negative portable chest x-ray.              Assessment/Plan:    Active Hospital Problems    Diagnosis Date Noted    Atrial flutter ([facility] [I48.92]     Elevated troponin [R77.8]     PAF (paroxysmal [facility] fibrillation) ([facility]) [I48.0]     History of pacemaker [Z95.0]     Bacteremia due to Gram-positive bacteria [R78.81] 10/09/2020    Acute UTI [N39.0] 10/07/2020    Hypotension [I95.9] 10/07/2020    Altered mental status [R41.82] 10/07/2020    Abdominal pain [R10.9] 05/28/2016    Acute kidney injury ([facility] [N17.9] 05/10/2016        1. AMS possibly 2/2 Acute Cystitis and sepsis  - appears to be improving  - TSH, RPR, b12, folate, and iron studies noncontributory.  B1 <0.2.  - Finished course of IV vanc and aztreonam, switched to IV flagyl + rocephin for GI pathogens. PO Ceftin and Flagyl starting 10/15 per ID in anticipation of SNF, dc Ceftin 10/16 and patient to get Flagyl as OP until 10/18  - Urine culture + e coli  - Blood culture + clostridium perfringens  - Encouraged nutrition and working with PT/OT. Silvia Scott refusing PT/OT, cont'd encouragement.    - Fortified Nutrition supplement     2. Sepsis with gram positive khurram bacteremia  - ID following. Recs d/c Ceftin and continue Flagyl TID until 10/18. Pt is refusing her medications and needs encouragement to take them.     - 1000 mL bolus NS given in ED. Sidra Prado.      3. UTI:  -See AMS     4. Abdominal Pain  - mildly diffuse, likely 2/2 poor PO intake over the last few days, patient still continues to have poor PO intake  - has miralax, colace, senna. Patient had a loose bowel movement today suspicious for Cdiff but otherwise was not complaining of abdominal discomfort until her abdomen is pressed, after which she has mild discomfort. - Will send stool for culture and PO vanc upon DC      5. Atypical Chest Pain:  Worsened by palpation  -Repeat EKG 10/11 without acute ischemic changes, peaked T-waves in anterolateral leads have improved. -Troponin 0.04->0.03; likely 2/2 demand ischemia from sepsis. Cardiology consulted, recs resume PO meds when pt agreeable to take; stop IV fluids given signs of fluid overload, resume AC if no procedures planned.      6. Acute Kidney Injury - Resolved     7. Hypotension - Resolved    DVT Prophylaxis: Xarelto  Diet: DIET DENTAL SOFT;  Dietary Nutrition Supplements: Standard High Calorie Oral Supplement  Code Status: Full Code  PT/OT Eval Status: Consulted    Dispo - To [facility], likely today    This patient was seen and evaluated with resident team and attending, Dr. Sekou Adair.     Dorene Griggs DO  Family Medicine Resident PGY-2

## 2020-10-17 NOTE — DISCHARGE SUMMARY
was checked for reversible causes of delirium including TSH, Iron studies, B1, B12, Folate, RPR all which were noncontributory.  Patient's mentation started to improve on hospital day 4 and continued to improve - she is alert, has slow mentation, but answers questions appropriately. She was refusing some of her oral medications, diet, and therapy. Patient had one episode of loose stool per RN (patient denies) that was questionable for cdiff - cultures were sent, will start vanc pending cultures. Decision was made to discharge patient to [address] [place] [address] in stable condition, with instructions to follow up with Cardiology and PCP. Physical Exam Performed:     /63   Pulse 61   Temp 98.1 °F (36.7 °C) (Oral)   Resp 17   Ht 5' 4\" (1.626 m)   Wt 166 lb 14.2 oz (75.7 kg)   SpO2 96%   BMI 28.65 kg/m²       General appearance:  NAD, appears stated age, and cooperative. HEENT:  Normal cephalic, atraumatic without obvious deformity. Conjunctivae clear. Neck: Supple. Trachea midline. Respiratory:  Normal respiratory effort. CTABL, without Rales/Wheezes/Rhonchi. Cardiovascular:  Regular rate and rhythm with normal S1/S2 without murmurs, rubs or gallops. Abdomen: +BS, non-distended, soft. Musculoskeletal:  No clubbing, cyanosis or edema bilaterally. Skin: Warm and dry  Neurologic:  No focal deficits, no gross sensory,motor deficits  Psychiatric:  Slow mentation, alert and oriented, thought content appropriate      Labs:  For convenience and continuity at follow-up the following most recent labs are provided:      CBC:    Lab Results   Component Value Date    WBC 5.9 10/17/2020    HGB 10.9 10/17/2020    HCT 33.7 10/17/2020     10/17/2020       Renal:    Lab Results   Component Value Date     10/17/2020    K 3.5 10/17/2020     10/17/2020    CO2 24 10/17/2020    BUN 5 10/17/2020    CREATININE 0.6 10/17/2020    CALCIUM 8.2 10/17/2020    PHOS 2.6 06/02/2016         Significant Diagnostic Studies    Radiology:   US GALLBLADDER RUQ   Final Result   Cholelithiasis. CT CHEST W CONTRAST   Final Result   1. No acute inflammatory changes surrounding the pacer pack in the anterior   chest wall on the left. No focal fluid collection or abnormal enhancement. 2. Small bilateral pleural effusions with dependent lower lobe atelectasis. 3. Moderate-sized hiatal hernia. CT HEAD WO CONTRAST   Final Result   No acute intracranial abnormality. Atrophy more pronounced in the posterior fossa, unchanged. CT ABDOMEN PELVIS WO CONTRAST Additional Contrast? None   Final Result   No acute findings. Gallstones. XR CHEST PORTABLE   Final Result   Negative portable chest x-ray. Consults:     IP CONSULT TO HOSPITALIST  IP CONSULT TO PHARMACY  IP CONSULT TO INFECTIOUS DISEASES  IP CONSULT TO PHARMACY  IP CONSULT TO CARDIOLOGY    Disposition:  To [address]     Condition at Discharge: Stable    Discharge Instructions/Follow-up:  Follow up with Cardiology on 11/09/2020. Schedule appointment with PCP in 1 week for ER follow up. Continue taking the Flagyl until 10/18. Cdiff cultures are pending - take vancomycin if results are positive.      Code Status:  Full Code     Activity: activity as tolerated    Diet: dental soft, high calorie oral supplement      Discharge Medications:     Discharge Medication List as of 10/17/2020  3:56 PM           Details   metroNIDAZOLE (FLAGYL) 500 MG tablet Take 1 tablet by mouth every 8 hours for 2 days, Disp-6 tablet,R-0Print      spironolactone (ALDACTONE) 25 MG tablet Take 1 tablet by mouth daily, Disp-30 tablet,R-0Print      vancomycin (VANCOCIN) 125 MG capsule Take 1 capsule by mouth 4 times daily for 10 days, Disp-40 capsule,R-0Print              Details   rivaroxaban (XARELTO) 20 MG TABS tablet TAKE 1 TABLET BY MOUTH DAILY WITH BREAKFAST, Disp-90 tablet,R-1Normal      dilTIAZem (TIAZAC) 120 MG extended release capsule TAKE ONE CAPSULE BY MOUTH DAILY, Disp-30 capsule,R-2Normal      potassium chloride (KLOR-CON M) 20 MEQ extended release tablet TAKE 1 TABLET BY MOUTH EVERY DAY, Disp-90 tablet,R-3Normal      lisinopril (PRINIVIL;ZESTRIL) 10 MG tablet TAKE 1 TABLET BY MOUTH DAILY, Disp-90 tablet,R-1Normal      rOPINIRole (REQUIP) 0.5 MG tablet Take 0.5 mg by mouth dailyHistorical Med      esomeprazole (NEXIUM) 40 MG delayed release capsule Take 40 mg by mouth every morning (before breakfast)Historical Med      ondansetron (ZOFRAN) 4 MG tablet Take 4 mg by mouth every 6 hours as needed for Nausea or VomitingHistorical Med      aspirin 81 MG tablet Take 81 mg by mouth dailyHistorical Med      albuterol sulfate  (90 BASE) MCG/ACT inhaler Inhale 2 puffs into the lungs every 6 hours as needed for Wheezing, Disp-1 Inhaler, R-0      acetaminophen (TYLENOL) 325 MG tablet Take 650 mg by mouth every 4 hours as needed for Pain      meclizine (ANTIVERT) 25 MG tablet TAKE 1 TABLET BY MOUTH EVERY DAY, Disp-30 tablet, R-0      fluticasone (FLONASE) 50 MCG/ACT nasal spray 1 spray by Nasal route daily. calcium carbonate (TUMS) 500 MG chewable tablet Take 1 tablet by mouth 2 times daily. CALCIUM CARBONATE-VITAMIN D PO Take 600 mg by mouth 2 times daily Historical Med             Time Spent on discharge is more than 45 minutes in the examination, evaluation, counseling and review of medications and discharge plan. This patient was seen and evaluated with resident team and the attending, Dr. Noel Ojeda. Nabil Rose DO   10/17/2020  Family Medicine Resident PGY2    Thank you David Cespedes MD for the opportunity to be involved in this patient's care. If you have any questions or concerns please feel free to contact me at [phone].

## 2020-10-17 NOTE — PROGRESS NOTES
Pt  A&O only to person and place; disoriented to time and situation. Pt gagging and nauseated. Unable to keep medications down; vomited medications back up. Pt gagging and vomiting with PO zofran; requested IV orders from MD. BS active x4; passing flatus; diarrhea. C/o back pain; relayed info to MD only PO med orders for pain control, unable to take d/t persistent nausea. Denies dyspnea. Call light within reach. Bed side table within reach. Wheels locked. Bed in lowest position. Bed check in place. Pt instructed to call out for assistance. Pt expressed understanding; reinforcement needed; avasys utilized. Shift assessment complete. All care cont per orders. Will cont to monitor.    Electronically signed by Telly Brooks RN on 10/17/2020 at 12:39 PM

## 2020-10-17 NOTE — PLAN OF CARE
Problem: Falls - Risk of:  Goal: Will remain free from falls  Description: Will remain free from falls  10/17/2020 1439 by Nima Boateng RN  Outcome: Completed  10/17/2020 1148 by Nima Boateng RN  Outcome: Ongoing  Goal: Absence of physical injury  Description: Absence of physical injury  10/17/2020 1439 by Nima Boateng RN  Outcome: Completed  10/17/2020 1148 by Nima Boateng RN  Outcome: Ongoing     Problem: Confusion - Acute:  Goal: Absence of continued neurological deterioration signs and symptoms  Description: Absence of continued neurological deterioration signs and symptoms  Outcome: Completed  Goal: Mental status will be restored to baseline  Description: Mental status will be restored to baseline  Outcome: Completed     Problem: Discharge Planning:  Goal: Ability to perform activities of daily living will improve  Description: Ability to perform activities of daily living will improve  Outcome: Completed  Goal: Participates in care planning  Description: Participates in care planning  Outcome: Completed     Problem: Injury - Risk of, Physical Injury:  Goal: Will remain free from falls  Description: Will remain free from falls  10/17/2020 1439 by Nima Boateng RN  Outcome: Completed  10/17/2020 1148 by Nima Boateng RN  Outcome: Ongoing  Goal: Absence of physical injury  Description: Absence of physical injury  10/17/2020 1439 by Nima Boateng RN  Outcome: Completed  10/17/2020 1148 by Nima Boateng RN  Outcome: Ongoing     Problem: Mood - Altered:  Goal: Mood stable  Description: Mood stable  Outcome: Completed  Goal: Absence of abusive behavior  Description: Absence of abusive behavior  Outcome: Completed  Goal: Verbalizations of feeling emotionally comfortable while being cared for will increase  Description: Verbalizations of feeling emotionally comfortable while being cared for will increase  Outcome: Completed     Problem: Psychomotor Activity - Altered:  Goal: Absence of psychomotor disturbance signs and symptoms  Description: Absence of psychomotor disturbance signs and symptoms  Outcome: Completed     Problem: Sensory Perception - Impaired:  Goal: Demonstrations of improved sensory functioning will increase  Description: Demonstrations of improved sensory functioning will increase  Outcome: Completed  Goal: Decrease in sensory misperception frequency  Description: Decrease in sensory misperception frequency  Outcome: Completed  Goal: Able to refrain from responding to false sensory perceptions  Description: Able to refrain from responding to false sensory perceptions  Outcome: Completed  Goal: Demonstrates accurate environmental perceptions  Description: Demonstrates accurate environmental perceptions  Outcome: Completed  Goal: Able to distinguish between reality-based and nonreality-based thinking  Description: Able to distinguish between reality-based and nonreality-based thinking  Outcome: Completed  Goal: Able to interrupt nonreality-based thinking  Description: Able to interrupt nonreality-based thinking  Outcome: Completed     Problem: Sleep Pattern Disturbance:  Goal: Appears well-rested  Description: Appears well-rested  Outcome: Completed     Problem: Skin Integrity:  Goal: Will show no infection signs and symptoms  Description: Will show no infection signs and symptoms  Outcome: Completed  Goal: Absence of new skin breakdown  Description: Absence of new skin breakdown  10/17/2020 1439 by Dona Pearson RN  Outcome: Completed  10/17/2020 0347 by Jessica Alba RN  Outcome: Ongoing  Note: Will continue to turn Pt on a Q2 turning schedule.       Problem: Nutrition  Goal: Optimal nutrition therapy  Outcome: Completed     Problem: Pain:  Goal: Pain level will decrease  Description: Pain level will decrease  Outcome: Completed  Goal: Control of acute pain  Description: Control of acute pain  Outcome: Completed  Goal: Control of chronic pain  Description: Control

## 2020-10-17 NOTE — CARE COORDINATION
CASE MANAGEMENT DISCHARGE SUMMARY      Discharge to: [facility] completed: [facility] Exemption Notification (HENS) completed: N/A    New Durable Medical Equipment ordered/agency: per facility    Transportation:    Family/car: no   Medical Transport explained to pt/family. Pt/family voice no agency preference. Agency used: [facility] [place] up time: 1600   Ambulance form completed: Yes    Confirmed discharge plan with:     Patient: yes per RN     Family, name and contact number: elizabeth Lara [phone]   Facility/Agency, name: [facility] faxed   Phone number for report to facility: [phone], name: Maximilian Uribe pending and will proceed with d/c plan once returns neg- rapid covid negative at time of d/c. Note: Discharging nurse to complete HECTOR, reconcile AVS, and place final copy with patient's discharge packet. RN to ensure that written prescriptions for  Level II medications are sent with patient to the facility as per protocol.

## 2020-10-17 NOTE — PLAN OF CARE
Problem: Falls - Risk of:  Goal: Will remain free from falls  Description: Will remain free from falls  Outcome: Ongoing     Problem: Falls - Risk of:  Goal: Absence of physical injury  Description: Absence of physical injury  Outcome: Ongoing     Problem: Injury - Risk of, Physical Injury:  Goal: Will remain free from falls  Description: Will remain free from falls  Outcome: Ongoing     Problem: Injury - Risk of, Physical Injury:  Goal: Absence of physical injury  Description: Absence of physical injury  Outcome: Ongoing     Problem: Falls - Risk of: Intervention: Assess risk factors for falls  Note: Pt is high fall risk  Intervention: Postfall assessment  Note: No falls sustained thus far this shift  Intervention: Manage a safe environment  Note: Call light within reach. Bed side table within reach. Wheels locked. Bed in lowest position. Bed check in place. Pt instructed to call out for assistance. Pt expressed understanding & calls out appropriately thus far. [employer] utilized. Intervention: Toileting assistance  Note: Incont; santiago care per staff     Problem: Injury - Risk of, Physical Injury:  Intervention: Manage a safe environment  Note: Call light within reach. Bed side table within reach. Wheels locked. Bed in lowest position. Bed check in place. Pt instructed to call out for assistance. Pt expressed understanding & calls out appropriately thus far. [employer] utilized.        Intervention: Toileting assistance  Note: Incont; santiago care per staff

## 2020-10-17 NOTE — PROGRESS NOTES
Pt A&O to person and place only. VSS. Denies dyspnea or N/V at this time. Bowel sound active in all quadrants. Sacrum is red and blanchable with a mepelex in place. Tele monitor in place showing atrial fibrillation. Pt is V paced. Assessment is as charted. CALL LIGHT & BEDSIDE TABLE WITHIN REACH, WHEELS LOCKED, BED IN LOWEST POSITION, BED ALARM ON, PT INSTRUCTED TO CALL OUT FOR ASSISTANCE & EXPRESSED UNDERSTANDING, CALLS OUT APPROPRIATELY.     Electronically signed by Nga Mills RN on 10/17/2020 at 3:34 AM

## 2020-10-17 NOTE — PROGRESS NOTES
Inpatient Family Medicine   Update Note    Was paged by RN who said that patient was gagging while trying to take the Zofran ODT. Went to check on patient who was not actively vomiting, was not toxic appearing, not complaining of pain, but refused to be examined. She did have a vomit bag in front of her but it was empty. RN also informed that patient was complaining of some back pain. /79   Pulse 60   Temp 97.4 °F (36.3 °C) (Oral)   Resp 16   Ht 5' 4\" (1.626 m)   Wt 166 lb 14.2 oz (75.7 kg)   SpO2 96%   BMI 28.65 kg/m²     General appearance:  NAD, appears stated age, and cooperative. Musculoskeletal: Moves extremities spontaneously. - Ordered lidocaine patch for back pain  - Ordered IV Zofran x1 dose for nausea.     This patient was seen and evaluated with resident team and attending, Dr. Dave Castro, DO    Family Medicine Resident PGY2

## 2020-11-03 PROBLEM — R42 DIZZINESS: Status: RESOLVED | Noted: 2020-11-03 | Resolved: 2020-11-03

## 2020-11-11 ENCOUNTER — OFFICE VISIT (OUTPATIENT)
Dept: CARDIOLOGY CLINIC | Age: 75
End: 2020-11-11
Payer: COMMERCIAL

## 2020-11-11 VITALS
HEART RATE: 61 BPM | OXYGEN SATURATION: 98 % | HEIGHT: 64 IN | BODY MASS INDEX: 28.65 KG/M2 | SYSTOLIC BLOOD PRESSURE: 112 MMHG | DIASTOLIC BLOOD PRESSURE: 60 MMHG

## 2020-11-11 PROCEDURE — 99214 OFFICE O/P EST MOD 30 MIN: CPT | Performed by: INTERNAL MEDICINE

## 2020-11-11 NOTE — PROGRESS NOTES
[facility]   Cardiac Consultation    Referring Provider:  Prashanth Baer MD     Chief Complaint   Patient presents with    Follow-up        History of Present Illness:  NEW patient for interventional cardiology for CHF, PAF, and HTN. She was admitted 10/07-10/17/20 with AMS 2/2 UTI and CP. Her troponins were elevated due to hypotension per DR Keyes's consult note. She was treated with IVF and for E.coli in urine. Her limited echo from 10/14/20 showed her EF was 55-60%. Moderate TR. Today she present for follow up. Recent admission evaluated for chest pain- unremarkable. She presents on a stretcher. BP at [facility] has been normal.  She has AMS on exam which is her baseline. She is a poor historian but denies CP, SOB, dizziness or syncope. Past Medical History:   has a past medical history of Allergic, Anemia, Atrial fibrillation ([facility] [address] Breast cancer [facility]), Cardiac pacemaker in situ, Cellulitis, Diffuse pneumonia, Hypertension, Murmur, Osteoarthritis, and Sinus drainage. Surgical History:   has a past surgical history that includes Mastectomy (Left); Total knee arthroplasty; and hernia repair. Social History:   reports that she quit smoking about 26 years ago. Her smoking use included cigarettes. She has never used smokeless tobacco. She reports that she does not drink alcohol or use drugs. Family History:  family history includes Arthritis in her paternal grandfather; Heart Disease in her father and mother; High Blood Pressure in her father and mother. Home Medications:  Prior to Admission medications    Medication Sig Start Date End Date Taking?  Authorizing Provider   spironolactone (ALDACTONE) 25 MG tablet Take 1 tablet by mouth daily 10/17/20   Aislinn Goins DO   rivaroxaban (XARELTO) 20 MG TABS tablet TAKE 1 TABLET BY MOUTH DAILY WITH BREAKFAST 8/21/20   Duc Babcock MD   dilTIAZem [facility]) 120 MG extended release capsule TAKE ONE CAPSULE BY MOUTH DAILY 8/20/20   Duc Babcock MD potassium chloride (KLOR-CON M) 20 MEQ extended release tablet TAKE 1 TABLET BY MOUTH EVERY DAY 8/6/20   Prabha Hope MD   lisinopril (PRINIVIL;ZESTRIL) 10 MG tablet TAKE 1 TABLET BY MOUTH DAILY 7/15/20   Prabha Hope MD   rOPINIRole (REQUIP) 0.5 MG tablet Take 0.5 mg by mouth daily    Historical Provider, MD   esomeprazole (NEXIUM) 40 MG delayed release capsule Take 40 mg by mouth every morning (before breakfast)    Historical Provider, MD   ondansetron (ZOFRAN) 4 MG tablet Take 4 mg by mouth every 6 hours as needed for Nausea or Vomiting    Historical Provider, MD   aspirin 81 MG tablet Take 81 mg by mouth daily    Historical Provider, MD   albuterol sulfate  (90 BASE) MCG/ACT inhaler Inhale 2 puffs into the lungs every 6 hours as needed for Wheezing 2/16/17   Yesy Rodriguez DO   acetaminophen (TYLENOL) 325 MG tablet Take 650 mg by mouth every 4 hours as needed for Pain    Historical Provider, MD   CALCIUM CARBONATE-VITAMIN D PO Take 600 mg by mouth 2 times daily     Historical Provider, MD   meclizine (ANTIVERT) 25 MG tablet TAKE 1 TABLET BY MOUTH EVERY DAY  Patient taking differently: prn 8/31/15   MARIOLA Hoang CNP   fluticasone (FLONASE) 50 MCG/ACT nasal spray 1 spray by Nasal route daily. Historical Provider, MD   calcium carbonate (TUMS) 500 MG chewable tablet Take 1 tablet by mouth 2 times daily. 12/23/10   Historical Provider, MD        Allergies:  Amoxicillin-pot clavulanate; Betadine [povidone iodine]; Cipro xr; Morphine; Phenergan [promethazine hcl]; Promethazine; Sulfamethoxazole-trimethoprim; Sulfamethoxazole-trimethoprim; Tape [adhesive tape]; Cephalexin; Codeine; and Prochlorperazine edisylate     Review of Systems:   · Constitutional: there has been no unanticipated weight loss. There's been no change in energy level, sleep pattern, or activity level. · Eyes: No visual changes or diplopia. No scleral icterus.   · ENT: No Headaches, hearing loss or vertigo. No mouth sores or sore throat. · Cardiovascular: Reviewed in HPI  · Respiratory: No cough or wheezing, no sputum production. No hematemesis. · Gastrointestinal: No abdominal pain, appetite loss, blood in stools. No change in bowel or bladder habits. · Genitourinary: No dysuria, trouble voiding, or hematuria. · Musculoskeletal:  No gait disturbance, weakness or joint complaints. · Integumentary: No rash or pruritis. · Neurological: No headache, diplopia, change in muscle strength, numbness or tingling. No change in gait, balance, coordination, mood, affect, memory, mentation, behavior. · Psychiatric: No anxiety, no depression. · Endocrine: No malaise, fatigue or temperature intolerance. No excessive thirst, fluid intake, or urination. No tremor. · Hematologic/Lymphatic: No abnormal bruising or bleeding, blood clots or swollen lymph nodes. · Allergic/Immunologic: No nasal congestion or hives. Physical Examination:    Vitals:    11/11/20 1333   BP: 112/60   Pulse: 61   SpO2: 98%        Constitutional and General Appearance: NAD   Respiratory:  · Normal excursion and expansion without use of accessory muscles  · Resp Auscultation: Normal breath sounds without dullness  Cardiovascular:  · The apical impulses not displaced  · Heart tones are crisp and normal  · Cervical veins are not engorged  · The carotid upstroke is normal in amplitude and contour without delay or bruit  · Normal S1S2, No S3, No Murmur  · Peripheral pulses are symmetrical and full  · There is no clubbing, cyanosis of the extremities.   · No edema  · Femoral Arteries: 2+ and equal  · Pedal Pulses: 2+ and equal   Abdomen:  · No masses or tenderness  · Liver/Spleen: No Abnormalities Noted  Neurological/Psychiatric:  · Alert and oriented in all spheres  · Moves all extremities well  · Exhibits normal gait balance and coordination  · No abnormalities of mood, affect, memory, mentation, or behavior are noted        Echocardiogram 2015  Summary   Left ventricle size is normal.   Normal left ventricular wall thickness. Global ejection fraction is normal and estimated from 50-55%. No regional wall motion abnormalities are noted. Diastolic filling parameters suggests diastolic dysfunction . Mild mitral regurgitation is present. Severely dilated left atrium with increased left atrial volume. Mild tricuspid regurgitation. Systolic pulmonary artery pressure (SPAP) is normal and estimated at 21 mmHg   (RA pressure 3 mm Hg). Stress test 2/13/17  Summary  There is normal isotope uptake at stress and rest. There is no evidence of  myocardial ischemia or scar. Normal LV function with ejection fraction of  67%. There are no regional wall motion abnormalities. Low risk study. Echocardiogram 5/13/19  Summary   Normal left ventricular systolic function with a visually estimated ejection   fraction of 55%. No regional wall motion abnormalities are seen. Normal left ventricular diastolic filling pressure. Bi-atrial enlargement. Mild mitral and tricuspid regurgitation. Systolic pulmonary artery pressure (SPAP) is normal and estimated at 28 mmHg   (right atrial pressure 8 mmHg). Limited echo: 10/14/20  Summary   Limited only f/u for LVEF and pacer wire infection. Normal left ventricular systolic function with ejection fraction of 55-60%. No regional wall motion abnormalites are seen. Compared to previous study from 5- no changes noted in left   ventricular function. Moderate tricuspid regurgitation. Systolic pulmonic artery pressure (SPAP) is estimated at 53 mmHg consistent   with mild pulmonary hypertension (Right atrial pressure of mmHg). Pacemaker / ICD lead is visualized in the right atrium. No mass or thrombus noted on leads, consider ARTHUR if further assessment is   needed. Assessment:   Chronic diastolic CHF - compensated, stable  PAF- S/P AV meek ablation with Dr. Edmond Mackey 2015. Stable.  No symptoms. Rate controlled    PPM placed 2015 - follows w/ EP  Mitral and tricuspid regurgitation - stable, asymptomatic. Echo reviewed w/ pt  Chronic anticoag     Plan:  Continue current medications  BP to be monitored at [facility]  Activity as possible at [facility]  No further testing needed  Follow up in 1 year      Scribe's attestation: This note was scribed in the presence of Dr. Shaggy Mcfadden by Dee Workman RN     The scribes documentation has been prepared under my direction and personally reviewed by me in its entirety. I confirm that the note above accurately reflects all work, treatment, procedures, and medical decision making performed by me. Dr. Shaggy Mcfadden MD      Thank you for allowing me to participate in the care of this individual.      Larene Cockayne.  Cori Copeland M.D., Jesse Roper

## 2020-11-11 NOTE — LETTER
[facility], [place]   Cardiac Consultation    Referring Provider:  Carolyn Poon MD     Chief Complaint   Patient presents with    Follow-up        History of Present Illness:  NEW patient for interventional cardiology for CHF, PAF, and HTN. She was admitted 10/07-10/17/20 with AMS 2/2 UTI and CP. Her troponins were elevated due to hypotension per DR Keyes's consult note. She was treated with IVF and for E.coli in urine. Her limited echo from 10/14/20 showed her EF was 55-60%. Moderate TR. Today she present for follow up. Recent admission evaluated for chest pain- unremarkable. She presents on a stretcher. BP at [facility] [place] has been normal.  She has AMS on exam which is her baseline. She is a poor historian but denies CP, SOB, dizziness or syncope. Past Medical History:   has a past medical history of Allergic, Anemia, Atrial fibrillation ([facility] [address] Breast cancer [facility]), Cardiac pacemaker in situ, Cellulitis, Diffuse pneumonia, Hypertension, Murmur, Osteoarthritis, and Sinus drainage. Surgical History:   has a past surgical history that includes Mastectomy (Left); Total knee arthroplasty; and hernia repair. Social History:   reports that she quit smoking about 26 years ago. Her smoking use included cigarettes. She has never used smokeless tobacco. She reports that she does not drink alcohol or use drugs. Family History:  family history includes Arthritis in her paternal grandfather; Heart Disease in her father and mother; High Blood Pressure in her father and mother. Home Medications:  Prior to Admission medications    Medication Sig Start Date End Date Taking?  Authorizing Provider   spironolactone (ALDACTONE) 25 MG tablet Take 1 tablet by mouth daily 10/17/20   Aislinn Goins DO   rivaroxaban (XARELTO) 20 MG TABS tablet TAKE 1 TABLET BY MOUTH DAILY WITH BREAKFAST 8/21/20   Elaine Gutierrez MD   dilTIAZem (TIAZAC) 120 MG extended release capsule TAKE ONE CAPSULE BY MOUTH DAILY 8/20/20   Hubert Fields MD   potassium chloride (KLOR-CON M) 20 MEQ extended release tablet TAKE 1 TABLET BY MOUTH EVERY DAY 8/6/20   Alize Hopkins MD   lisinopril (PRINIVIL;ZESTRIL) 10 MG tablet TAKE 1 TABLET BY MOUTH DAILY 7/15/20   Alize Hopkins MD   rOPINIRole (REQUIP) 0.5 MG tablet Take 0.5 mg by mouth daily    Historical Provider, MD   esomeprazole (NEXIUM) 40 MG delayed release capsule Take 40 mg by mouth every morning (before breakfast)    Historical Provider, MD   ondansetron (ZOFRAN) 4 MG tablet Take 4 mg by mouth every 6 hours as needed for Nausea or Vomiting    Historical Provider, MD   aspirin 81 MG tablet Take 81 mg by mouth daily    Historical Provider, MD   albuterol sulfate  (90 BASE) MCG/ACT inhaler Inhale 2 puffs into the lungs every 6 hours as needed for Wheezing 2/16/17   Tej Way DO   acetaminophen (TYLENOL) 325 MG tablet Take 650 mg by mouth every 4 hours as needed for Pain    Historical Provider, MD   CALCIUM CARBONATE-VITAMIN D PO Take 600 mg by mouth 2 times daily     Historical Provider, MD   meclizine (ANTIVERT) 25 MG tablet TAKE 1 TABLET BY MOUTH EVERY DAY  Patient taking differently: prn 8/31/15   MARIOLA Roberts CNP   fluticasone (FLONASE) 50 MCG/ACT nasal spray 1 spray by Nasal route daily. Historical Provider, MD   calcium carbonate (TUMS) 500 MG chewable tablet Take 1 tablet by mouth 2 times daily. 12/23/10   Historical Provider, MD        Allergies:  Amoxicillin-pot clavulanate; Betadine [povidone iodine]; Cipro xr; Morphine; Phenergan [promethazine hcl]; Promethazine; Sulfamethoxazole-trimethoprim; Sulfamethoxazole-trimethoprim; Tape [adhesive tape]; Cephalexin; Codeine; and Prochlorperazine edisylate     Review of Systems:   · Constitutional: there has been no unanticipated weight loss. There's been no change in energy level, sleep pattern, or activity level. · Eyes: No visual changes or diplopia. No scleral icterus. · ENT: No Headaches, hearing loss or vertigo. No mouth sores or sore throat. · Cardiovascular: Reviewed in HPI  · Respiratory: No cough or wheezing, no sputum production. No hematemesis. · Gastrointestinal: No abdominal pain, appetite loss, blood in stools. No change in bowel or bladder habits. · Genitourinary: No dysuria, trouble voiding, or hematuria. · Musculoskeletal:  No gait disturbance, weakness or joint complaints. · Integumentary: No rash or pruritis. · Neurological: No headache, diplopia, change in muscle strength, numbness or tingling. No change in gait, balance, coordination, mood, affect, memory, mentation, behavior. · Psychiatric: No anxiety, no depression. · Endocrine: No malaise, fatigue or temperature intolerance. No excessive thirst, fluid intake, or urination. No tremor. · Hematologic/Lymphatic: No abnormal bruising or bleeding, blood clots or swollen lymph nodes. · Allergic/Immunologic: No nasal congestion or hives. Physical Examination:    Vitals:    11/11/20 1333   BP: 112/60   Pulse: 61   SpO2: 98%        Constitutional and General Appearance: NAD   Respiratory:  · Normal excursion and expansion without use of accessory muscles  · Resp Auscultation: Normal breath sounds without dullness  Cardiovascular:  · The apical impulses not displaced  · Heart tones are crisp and normal  · Cervical veins are not engorged  · The carotid upstroke is normal in amplitude and contour without delay or bruit  · Normal S1S2, No S3, No Murmur  · Peripheral pulses are symmetrical and full  · There is no clubbing, cyanosis of the extremities.   · No edema  · Femoral Arteries: 2+ and equal  · Pedal Pulses: 2+ and equal   Abdomen:  · No masses or tenderness  · Liver/Spleen: No Abnormalities Noted  Neurological/Psychiatric:  · Alert and oriented in all spheres  · Moves all extremities well  · Exhibits normal gait balance and coordination · No abnormalities of mood, affect, memory, mentation, or behavior are noted        Echocardiogram 2015  Summary   Left ventricle size is normal.   Normal left ventricular wall thickness. Global ejection fraction is normal and estimated from 50-55%. No regional wall motion abnormalities are noted. Diastolic filling parameters suggests diastolic dysfunction . Mild mitral regurgitation is present. Severely dilated left atrium with increased left atrial volume. Mild tricuspid regurgitation. Systolic pulmonary artery pressure (SPAP) is normal and estimated at 21 mmHg   (RA pressure 3 mm Hg). Stress test 2/13/17  Summary  There is normal isotope uptake at stress and rest. There is no evidence of  myocardial ischemia or scar. Normal LV function with ejection fraction of  67%. There are no regional wall motion abnormalities. Low risk study. Echocardiogram 5/13/19  Summary   Normal left ventricular systolic function with a visually estimated ejection   fraction of 55%. No regional wall motion abnormalities are seen. Normal left ventricular diastolic filling pressure. Bi-atrial enlargement. Mild mitral and tricuspid regurgitation. Systolic pulmonary artery pressure (SPAP) is normal and estimated at 28 mmHg   (right atrial pressure 8 mmHg). Limited echo: 10/14/20  Summary   Limited only f/u for LVEF and pacer wire infection. Normal left ventricular systolic function with ejection fraction of 55-60%. No regional wall motion abnormalites are seen. Compared to previous study from 5- no changes noted in left   ventricular function. Moderate tricuspid regurgitation. Systolic pulmonic artery pressure (SPAP) is estimated at 53 mmHg consistent   with mild pulmonary hypertension (Right atrial pressure of mmHg). Pacemaker / ICD lead is visualized in the right atrium. No mass or thrombus noted on leads, consider ARTHUR if further assessment is   needed.       Assessment: Chronic diastolic CHF - compensated, stable  PAF- S/P AV meek ablation with Dr. Galvan Neither 2015. Stable. No symptoms. Rate controlled    PPM placed 2015 - follows w/ EP  Mitral and tricuspid regurgitation - stable, asymptomatic. Echo reviewed w/ pt  Chronic anticoag     Plan:  Continue current medications  BP to be monitored at [facility]  Activity as possible at [facility]  No further testing needed  Follow up in 1 year      Scribe's attestation: This note was scribed in the presence of Dr. Pedro Pal by Conchita Servin RN     The scribes documentation has been prepared under my direction and personally reviewed by me in its entirety. I confirm that the note above accurately reflects all work, treatment, procedures, and medical decision making performed by me. Dr. Pedro Pal MD      Thank you for allowing me to participate in the care of this individual.      Gary Nolasco M.D., Nitesh Jimenez

## 2021-03-03 DIAGNOSIS — I48.20 CHRONIC ATRIAL FIBRILLATION (HCC): ICD-10-CM

## 2021-03-03 RX ORDER — DILTIAZEM HYDROCHLORIDE 120 MG/1
CAPSULE, EXTENDED RELEASE ORAL
Qty: 90 CAPSULE | Refills: 2 | Status: SHIPPED | OUTPATIENT
Start: 2021-03-03 | End: 2021-11-22

## 2021-03-23 DIAGNOSIS — I48.0 PAROXYSMAL ATRIAL FIBRILLATION (HCC): ICD-10-CM

## 2021-03-26 RX ORDER — LISINOPRIL 10 MG/1
10 TABLET ORAL DAILY
Qty: 90 TABLET | Refills: 2 | Status: SHIPPED | OUTPATIENT
Start: 2021-03-26 | End: 2021-12-22

## 2021-06-22 RX ORDER — POTASSIUM CHLORIDE 20 MEQ/1
TABLET, EXTENDED RELEASE ORAL
Qty: 90 TABLET | Refills: 3 | Status: SHIPPED | OUTPATIENT
Start: 2021-06-22

## 2021-11-19 DIAGNOSIS — I48.20 CHRONIC ATRIAL FIBRILLATION (HCC): ICD-10-CM

## 2021-11-22 RX ORDER — DILTIAZEM HYDROCHLORIDE 120 MG/1
CAPSULE, EXTENDED RELEASE ORAL
Qty: 90 CAPSULE | Refills: 2 | Status: SHIPPED | OUTPATIENT
Start: 2021-11-22

## 2021-12-19 DIAGNOSIS — I10 ESSENTIAL HYPERTENSION: Primary | ICD-10-CM

## 2021-12-22 RX ORDER — LISINOPRIL 10 MG/1
10 TABLET ORAL DAILY
Qty: 90 TABLET | Refills: 3 | Status: SHIPPED | OUTPATIENT
Start: 2021-12-22

## 2021-12-28 DIAGNOSIS — I48.0 PAROXYSMAL ATRIAL FIBRILLATION (HCC): ICD-10-CM

## 2022-12-21 DIAGNOSIS — I48.20 CHRONIC ATRIAL FIBRILLATION (HCC): ICD-10-CM

## 2022-12-22 NOTE — TELEPHONE ENCOUNTER
Please schedule pt first available OV with [facility] for med refills, then route back to board. Thank you!      Last OV [facility] 11/11/2020

## 2022-12-27 NOTE — TELEPHONE ENCOUNTER
JUNIOR tried calling pt again several times to see if the busy tone would stop. Cannot reach pt, she does not have an updated hipaa form, last form is from 2018. I created a letter to have the pt or pt's contact to contact our office to update her demographics and schedule a followup appt with [facility].

## 2022-12-29 RX ORDER — DILTIAZEM HYDROCHLORIDE 120 MG/1
CAPSULE, EXTENDED RELEASE ORAL
Qty: 90 CAPSULE | Refills: 2 | OUTPATIENT
Start: 2022-12-29

## 2023-03-07 NOTE — PROGRESS NOTES
Inpatient Family Medicine Progress Note      PCP: Marina Roque MD    Date of Admission: 10/7/2020    [address] Course:   66 y. o. female who presented to [facility] with AMS.  Patient is unable to give much information, but son was available to give story. Patient was showing fatigue and AMS.  Also was eating less food. Went to doctor on 9/29 and was found to have PATITO.  Wasn't able to give a urine at that visit.  Mental status continued to deteriorate over the past few days and was unable to take care of herself.  Has abdominal pain and some back pain.  Also fatigue.  No fever/chills.  No chest pain.  A little shortness of breath.  No N/V/D.  Is unsure of if there was any pain with urination or blood in her urine.  Keeps saying \"I don't know\" and asks \"Can I move? \"     While in the ED, patient was consistently uncomfortable on her bed.  Labwork showed significant PATITO with Creatitine of 3.8 and GFR of 11.  Mild AST, bilirubin, lipase elevation.  Elevated WBC of 11.8.  UA significant for likely UTI.  No acute changes on head CT.  Abdominal CT showed gallstones but no other acute findings.  Because of patient's intolerance to multiple medications int he past, ED consulted pharmacy and decided to start Aztreonam to treat UTI.  Patient admitted to hospital for further care. Aztreonam decreased to 250mg BID due to low GFR. PT/OT ordered. Pt looked dry so fluids increased from 50ml/hr to 100ml/hr. One blood culture came back positive for gram positive rods, so started on Vanc which will be monitored by Pharmacy. ID consulted.  With improvement in renal function patient's aztreonam dose increased to 500mg TID.  Patient was checked for reversible causes of delirium including TSH, Iron studies, B1, B12, Folate, RPR.  Patient did start improving in mentation on hospital day 4, demonstrated by increased alertness and becoming oriented to self and place.   Patient continued to refuse PO medications and PT/OT evaluation for discharge recommendations. Subjective: Patient is alert and allowing testing today. Cognition is improving and she is joking around. Not complaining of being cold anymore. Medications:  Reviewed    Infusion Medications    dextrose 5% and 0.9% NaCl with KCl 20 mEq 75 mL/hr at 10/14/20 1036     Scheduled Medications    magnesium sulfate  1 g Intravenous Once    polyethylene glycol  17 g Oral Daily    cefTRIAXone (ROCEPHIN) IV  1 g Intravenous Q24H    metroNIDAZOLE  500 mg Intravenous Q8H    enoxaparin  1.5 mg/kg (Adjusted) Subcutaneous Daily    pantoprazole  40 mg Intravenous Daily    [Held by provider] rivaroxaban  15 mg Oral Daily    miconazole   Topical BID    aspirin  81 mg Oral Daily    dilTIAZem  120 mg Oral Daily    fluticasone  1 spray Nasal Daily    rOPINIRole  0.5 mg Oral Daily    lisinopril  10 mg Oral Daily    sodium chloride flush  10 mL Intravenous 2 times per day     PRN Meds: potassium chloride, perflutren lipid microspheres, acetaminophen, meclizine, sodium chloride flush, acetaminophen **OR** acetaminophen, polyethylene glycol, ondansetron, albuterol      Intake/Output Summary (Last 24 hours) at 10/14/2020 1049  Last data filed at 10/14/2020 0908  Gross per 24 hour   Intake 2508 ml   Output 400 ml   Net 2108 ml       Physical Exam Performed:    /72   Pulse 60   Temp 97.8 °F (36.6 °C) (Oral)   Resp 18   Ht 5' 4\" (1.626 m)   Wt 165 lb 9.1 oz (75.1 kg)   SpO2 97%   BMI 28.42 kg/m²     General appearance: No apparent distress, appears stated age and cooperative. HEENT: Pupils equal, round, and reactive to light. Conjunctivae/corneas clear. Neck: Supple, with full range of motion. No jugular venous distention. Trachea midline. Respiratory:  Normal respiratory effort. Clear to auscultation, bilaterally without Rales/Wheezes/Rhonchi.   Cardiovascular: Regular rate and rhythm with normal S1/S2 without murmurs, rubs or gallops. Abdomen: Soft, non-tender, non-distended with normal bowel sounds. Musculoskeletal: No clubbing, cyanosis or edema bilaterally. Full range of motion without deformity. Skin: Skin color, texture, turgor normal.  No rashes or lesions. Neurologic:  Neurovascularly intact without any focal sensory/motor deficits. Cranial nerves: II-XII intact, grossly non-focal.  Psychiatric: Alert and oriented, thought content appropriate, normal insight  Capillary Refill: Brisk,< 3 seconds   Peripheral Pulses: +2 palpable, equal bilaterally       Labs:   Recent Labs     10/12/20  0636 10/13/20  0639 10/14/20  0556   WBC 6.5 7.1 6.3   HGB 11.5* 11.8* 11.3*   HCT 35.4* 36.4 35.4*    148 151     Recent Labs     10/12/20  1540 10/13/20  0639 10/14/20  0556    138 140   K 3.7 3.2* 3.6    108 110   CO2 18* 19* 20*   BUN 6* 5* 4*   CREATININE 0.6 0.6 0.6   CALCIUM 8.1* 8.1* 8.1*     No results for input(s): AST, ALT, BILIDIR, BILITOT, ALKPHOS in the last 72 hours. No results for input(s): INR in the last 72 hours. Recent Labs     10/11/20  1105   TROPONINI 0.03*       Urinalysis:      Lab Results   Component Value Date    NITRU Negative 10/07/2020    WBCUA >100 10/07/2020    BACTERIA 4+ 10/07/2020    RBCUA see below 10/07/2020    BLOODU SMALL 10/07/2020    SPECGRAV 1.025 10/07/2020    GLUCOSEU Negative 10/07/2020    GLUCOSEU NEGATIVE 04/24/2012       Radiology:  US GALLBLADDER RUQ   Final Result   Cholelithiasis. CT CHEST W CONTRAST   Final Result   1. No acute inflammatory changes surrounding the pacer pack in the anterior   chest wall on the left. No focal fluid collection or abnormal enhancement. 2. Small bilateral pleural effusions with dependent lower lobe atelectasis. 3. Moderate-sized hiatal hernia. CT HEAD WO CONTRAST   Final Result   No acute intracranial abnormality. Atrophy more pronounced in the posterior fossa, unchanged.          CT ABDOMEN PELVIS WO CONTRAST Additional Contrast? None   Final Result   No acute findings. Gallstones. XR CHEST PORTABLE   Final Result   Negative portable chest x-ray. Assessment/Plan:    Active Hospital Problems    Diagnosis Date Noted    Elevated troponin [R77.8]     PAF [facility]paroxysmal [facility] fibrillation) ([facility]) [I48.0]     History of pacemaker [Z95.0]     Bacteremia due to Gram-positive bacteria [R78.81] 10/09/2020    Acute UTI [N39.0] 10/07/2020    Hypotension [I95.9] 10/07/2020    Altered mental status [R41.82] 10/07/2020    Acute kidney injury ([facility] [address] [N17.9] 05/10/2016     1. AMS possibly 2/2 Acute Cystitis and sepsis, but if not responding to abx treatment may need dementia workup. (Improving)  -TSH, RPR, b12, folate, and iron studies noncontributory.  B1 pending.  - Finished course of IV vanc and aztreonam.  Switched to Metronidazole and Ceftriaxone for GI pathogens. Will switch to PO Ceftin and Flagyl tomorrow per ID in anticipation of SNF.  - Urine culture - e coli  - Blood culture positive - clostridium perfringens  -Encouraged nutrition and working with PT/OT. María  refusing PT/OT, cont'd encouragement.    -Fortified Nutrition supplement     2. Sepsis with gram positive khurram bacteremia, ID showing Clostridium perfringens, white count responding to vanc.  Questionable allergy to cephalosporins and patient refusing PO.   - Infectious disease consult.  Abx per ID.   - 1000 mL bolus NS given in ED.  - Monitor vitals and labs during visit. - Currently on 100 mL/hr of NS.  - Watch for signs of fluid overload  - Mentation improving, patient more alert today.     3. UTI:  -See AMS     4. Atypical Chest Pain:  Worsened by palpation  -Repeat EKG without acute ischemic changes, peaked T-waves in anterolateral leads have improved. -Troponin elevated at 0.04, will trend and consult cardiology. Luic Cagle likely 2/2 demand ischemia from sepsis. -Cardiology consulted.   Patient refusing echo, CT chest without source of infection.     5. Acute Kidney Injury (Resolved) Creatinine 0.6.     6. Hypotension resolved with sepsis treatment. -Monitor for fluid overload    DVT Prophylaxis: Lovenox 1.5 mg/kg/qd, SCDs  Diet: DIET DENTAL SOFT;  Dietary Nutrition Supplements: Frozen Oral Supplement  Dietary Nutrition Supplements: Other Oral Supplement (see comment)  Code Status: Full Code  PT/OT Eval Status: Recommending SNF    [address] likely tomorrow if tolerating PO. This patient was seen and evaluated with the resident team and with the attending, Dr. Edwina Brewer. (Please note that portions of this note were completed with a voice recognition program.  Efforts were made to edit the dictations but occasionally words are mis-transcribed.)    Earlene Antunez,   Family Medicine Resident PGY2    Resident Physician Progress Note Addendum    Follow up note: I have seen, examined and evaluated the patient with the resident physician under my direct supervision. I have reviewed the resident physician's note and agree with the assessment and plan of care as documented with the following addendum. Pt clinically improving. Cont abx, await dc to SNF likely tomorrow.     Dr Melita Callaway No

## 2023-04-12 PROBLEM — R65.21 SEPTIC SHOCK (HCC): Status: ACTIVE | Noted: 2023-04-12

## 2023-04-12 PROBLEM — R65.20 SEVERE SEPSIS (HCC): Status: ACTIVE | Noted: 2023-04-12

## 2023-04-12 PROBLEM — E87.29 HIGH ANION GAP METABOLIC ACIDOSIS: Status: ACTIVE | Noted: 2023-04-12

## 2023-04-12 PROBLEM — R06.82 TACHYPNEA: Status: ACTIVE | Noted: 2023-04-12

## 2023-04-12 PROBLEM — R79.89 ELEVATED LACTIC ACID LEVEL: Status: ACTIVE | Noted: 2023-04-12

## 2023-04-12 PROBLEM — G93.41 ACUTE METABOLIC ENCEPHALOPATHY: Status: ACTIVE | Noted: 2023-04-12

## 2023-04-12 PROBLEM — A41.9 SEVERE SEPSIS (HCC): Status: ACTIVE | Noted: 2023-04-12

## 2023-04-12 PROBLEM — A41.9 SEPTIC SHOCK (HCC): Status: ACTIVE | Noted: 2023-04-12

## 2023-04-12 PROBLEM — K56.609 SMALL BOWEL OBSTRUCTION (HCC): Status: ACTIVE | Noted: 2023-04-12

## 2023-04-12 PROBLEM — R55 SYNCOPE AND COLLAPSE: Status: ACTIVE | Noted: 2023-04-12

## 2023-04-12 PROBLEM — N30.01 ACUTE CYSTITIS WITH HEMATURIA: Status: ACTIVE | Noted: 2023-04-12

## 2023-04-12 PROBLEM — R11.2 NAUSEA AND VOMITING: Status: ACTIVE | Noted: 2023-04-12

## 2023-04-12 PROBLEM — J96.01 ACUTE RESPIRATORY FAILURE WITH HYPOXIA (HCC): Status: ACTIVE | Noted: 2023-04-12
